# Patient Record
Sex: FEMALE | Race: WHITE | NOT HISPANIC OR LATINO | Employment: OTHER | URBAN - NONMETROPOLITAN AREA
[De-identification: names, ages, dates, MRNs, and addresses within clinical notes are randomized per-mention and may not be internally consistent; named-entity substitution may affect disease eponyms.]

---

## 2019-04-09 ENCOUNTER — HOSPITAL ENCOUNTER (EMERGENCY)
Facility: OTHER | Age: 24
Discharge: HOME | End: 2019-04-09
Attending: PHYSICIAN ASSISTANT | Admitting: PHYSICIAN ASSISTANT
Payer: COMMERCIAL

## 2019-04-09 ENCOUNTER — APPOINTMENT (OUTPATIENT)
Dept: GENERAL RADIOLOGY | Facility: OTHER | Age: 24
End: 2019-04-09
Attending: PHYSICIAN ASSISTANT
Payer: COMMERCIAL

## 2019-04-09 VITALS
OXYGEN SATURATION: 100 % | WEIGHT: 135 LBS | TEMPERATURE: 97.8 F | DIASTOLIC BLOOD PRESSURE: 79 MMHG | HEIGHT: 64 IN | BODY MASS INDEX: 23.05 KG/M2 | HEART RATE: 104 BPM | RESPIRATION RATE: 14 BRPM | SYSTOLIC BLOOD PRESSURE: 126 MMHG

## 2019-04-09 DIAGNOSIS — F41.0 ANXIETY ATTACK: ICD-10-CM

## 2019-04-09 DIAGNOSIS — R07.89 ATYPICAL CHEST PAIN: ICD-10-CM

## 2019-04-09 LAB
ALBUMIN SERPL-MCNC: 4.1 G/DL (ref 3.5–5.7)
ALBUMIN UR-MCNC: NEGATIVE MG/DL
ALP SERPL-CCNC: 44 U/L (ref 34–104)
ALT SERPL W P-5'-P-CCNC: 12 U/L (ref 7–52)
ANION GAP SERPL CALCULATED.3IONS-SCNC: 8 MMOL/L (ref 3–14)
APPEARANCE UR: CLEAR
AST SERPL W P-5'-P-CCNC: 15 U/L (ref 13–39)
BASOPHILS # BLD AUTO: 0 10E9/L (ref 0–0.2)
BASOPHILS NFR BLD AUTO: 0.7 %
BILIRUB SERPL-MCNC: 0.7 MG/DL (ref 0.3–1)
BILIRUB UR QL STRIP: NEGATIVE
BUN SERPL-MCNC: 13 MG/DL (ref 7–25)
CALCIUM SERPL-MCNC: 9.3 MG/DL (ref 8.6–10.3)
CHLORIDE SERPL-SCNC: 109 MMOL/L (ref 98–107)
CO2 SERPL-SCNC: 23 MMOL/L (ref 21–31)
COLOR UR AUTO: YELLOW
CREAT SERPL-MCNC: 0.91 MG/DL (ref 0.6–1.2)
D DIMER PPP DDU-MCNC: <200 NG/ML D-DU (ref 0–230)
DIFFERENTIAL METHOD BLD: NORMAL
EOSINOPHIL # BLD AUTO: 0.2 10E9/L (ref 0–0.7)
EOSINOPHIL NFR BLD AUTO: 3.5 %
ERYTHROCYTE [DISTWIDTH] IN BLOOD BY AUTOMATED COUNT: 14 % (ref 10–15)
GFR SERPL CREATININE-BSD FRML MDRD: 77 ML/MIN/{1.73_M2}
GLUCOSE SERPL-MCNC: 95 MG/DL (ref 70–105)
GLUCOSE UR STRIP-MCNC: NEGATIVE MG/DL
HCG UR QL: NEGATIVE
HCT VFR BLD AUTO: 37.1 % (ref 35–47)
HGB BLD-MCNC: 12.1 G/DL (ref 11.7–15.7)
HGB UR QL STRIP: NEGATIVE
IMM GRANULOCYTES # BLD: 0 10E9/L (ref 0–0.4)
IMM GRANULOCYTES NFR BLD: 0.2 %
INR PPP: 0.81 (ref 0–1.3)
KETONES UR STRIP-MCNC: NEGATIVE MG/DL
LEUKOCYTE ESTERASE UR QL STRIP: NEGATIVE
LYMPHOCYTES # BLD AUTO: 2.2 10E9/L (ref 0.8–5.3)
LYMPHOCYTES NFR BLD AUTO: 39.8 %
MCH RBC QN AUTO: 29.4 PG (ref 26.5–33)
MCHC RBC AUTO-ENTMCNC: 32.6 G/DL (ref 31.5–36.5)
MCV RBC AUTO: 90 FL (ref 78–100)
MONOCYTES # BLD AUTO: 0.6 10E9/L (ref 0–1.3)
MONOCYTES NFR BLD AUTO: 10 %
NEUTROPHILS # BLD AUTO: 2.5 10E9/L (ref 1.6–8.3)
NEUTROPHILS NFR BLD AUTO: 45.8 %
NITRATE UR QL: NEGATIVE
PH UR STRIP: 6 PH (ref 5–9)
PLATELET # BLD AUTO: 203 10E9/L (ref 150–450)
POTASSIUM SERPL-SCNC: 4.1 MMOL/L (ref 3.5–5.1)
PROT SERPL-MCNC: 6.7 G/DL (ref 6.4–8.9)
RBC # BLD AUTO: 4.12 10E12/L (ref 3.8–5.2)
SODIUM SERPL-SCNC: 140 MMOL/L (ref 134–144)
SOURCE: NORMAL
SP GR UR STRIP: 1.02 (ref 1–1.03)
TROPONIN I SERPL-MCNC: <0.03 UG/L (ref 0–0.03)
TSH SERPL DL<=0.05 MIU/L-ACNC: 3.05 IU/ML (ref 0.34–5.6)
UROBILINOGEN UR STRIP-ACNC: 0.2 EU/DL (ref 0.2–1)
WBC # BLD AUTO: 5.5 10E9/L (ref 4–11)

## 2019-04-09 PROCEDURE — 71046 X-RAY EXAM CHEST 2 VIEWS: CPT

## 2019-04-09 PROCEDURE — 81003 URINALYSIS AUTO W/O SCOPE: CPT | Performed by: PHYSICIAN ASSISTANT

## 2019-04-09 PROCEDURE — 99285 EMERGENCY DEPT VISIT HI MDM: CPT | Mod: 25 | Performed by: PHYSICIAN ASSISTANT

## 2019-04-09 PROCEDURE — 25000132 ZZH RX MED GY IP 250 OP 250 PS 637: Performed by: PHYSICIAN ASSISTANT

## 2019-04-09 PROCEDURE — 85379 FIBRIN DEGRADATION QUANT: CPT | Performed by: PHYSICIAN ASSISTANT

## 2019-04-09 PROCEDURE — 93005 ELECTROCARDIOGRAM TRACING: CPT | Performed by: PHYSICIAN ASSISTANT

## 2019-04-09 PROCEDURE — 85025 COMPLETE CBC W/AUTO DIFF WBC: CPT | Performed by: PHYSICIAN ASSISTANT

## 2019-04-09 PROCEDURE — 84443 ASSAY THYROID STIM HORMONE: CPT | Performed by: PHYSICIAN ASSISTANT

## 2019-04-09 PROCEDURE — 80053 COMPREHEN METABOLIC PANEL: CPT | Performed by: PHYSICIAN ASSISTANT

## 2019-04-09 PROCEDURE — 84484 ASSAY OF TROPONIN QUANT: CPT | Performed by: PHYSICIAN ASSISTANT

## 2019-04-09 PROCEDURE — 99284 EMERGENCY DEPT VISIT MOD MDM: CPT | Mod: Z6 | Performed by: PHYSICIAN ASSISTANT

## 2019-04-09 PROCEDURE — 25000125 ZZHC RX 250: Performed by: PHYSICIAN ASSISTANT

## 2019-04-09 PROCEDURE — 93010 ELECTROCARDIOGRAM REPORT: CPT | Performed by: INTERNAL MEDICINE

## 2019-04-09 PROCEDURE — 85610 PROTHROMBIN TIME: CPT | Performed by: PHYSICIAN ASSISTANT

## 2019-04-09 PROCEDURE — 81025 URINE PREGNANCY TEST: CPT | Performed by: PHYSICIAN ASSISTANT

## 2019-04-09 PROCEDURE — 36415 COLL VENOUS BLD VENIPUNCTURE: CPT | Performed by: PHYSICIAN ASSISTANT

## 2019-04-09 RX ORDER — NORETHINDRONE ACETATE AND ETHINYL ESTRADIOL 1MG-20(21)
1 KIT ORAL DAILY
COMMUNITY
End: 2019-11-11

## 2019-04-09 RX ORDER — HYDROXYZINE HYDROCHLORIDE 25 MG/1
25 TABLET, FILM COATED ORAL 3 TIMES DAILY PRN
COMMUNITY
End: 2019-08-20

## 2019-04-09 RX ORDER — PANTOPRAZOLE SODIUM 40 MG/1
40 TABLET, DELAYED RELEASE ORAL
Status: DISCONTINUED | OUTPATIENT
Start: 2019-04-10 | End: 2019-04-09 | Stop reason: HOSPADM

## 2019-04-09 RX ORDER — LORAZEPAM 1 MG/1
1 TABLET ORAL EVERY 6 HOURS PRN
Qty: 20 TABLET | Refills: 0 | Status: SHIPPED | OUTPATIENT
Start: 2019-04-09 | End: 2019-08-20

## 2019-04-09 RX ORDER — LORAZEPAM 1 MG/1
1 TABLET ORAL ONCE
Status: COMPLETED | OUTPATIENT
Start: 2019-04-09 | End: 2019-04-09

## 2019-04-09 RX ADMIN — PANTOPRAZOLE SODIUM 40 MG: 40 TABLET, DELAYED RELEASE ORAL at 17:51

## 2019-04-09 RX ADMIN — LORAZEPAM 1 MG: 1 TABLET ORAL at 19:25

## 2019-04-09 RX ADMIN — LIDOCAINE HYDROCHLORIDE 30 ML: 20 SOLUTION ORAL; TOPICAL at 17:51

## 2019-04-09 ASSESSMENT — ENCOUNTER SYMPTOMS
ABDOMINAL PAIN: 0
BACK PAIN: 0
ADENOPATHY: 0
CHILLS: 0
CONFUSION: 0
HEMATURIA: 0
CHEST TIGHTNESS: 1
LIGHT-HEADEDNESS: 0
FEVER: 0
DIZZINESS: 0
BRUISES/BLEEDS EASILY: 0
WOUND: 0
SHORTNESS OF BREATH: 0

## 2019-04-09 ASSESSMENT — MIFFLIN-ST. JEOR: SCORE: 1352.36

## 2019-04-09 NOTE — ED TRIAGE NOTES
Pt presents to the ED after having chest pain intermittently for 2 weeks. Pt reports having pain wake her up today at 1550, pt reports the pain wraps around to her back and her left arm is numb which is new. Pt is on oral contraceptive.     Mitzi Nguyễn RN on 4/9/2019 at 4:42 PM

## 2019-04-09 NOTE — ED AVS SNAPSHOT
[facility]  [address]  [place] [address]  Phone:  [phone]  Fax:  [phone]                                    Paula Watson   MRN: [id number]    Department:  [facility]   Date of Visit:  4/9/2019           After Visit Summary Signature Page    I have received my discharge instructions, and my questions have been answered. I have discussed any challenges I see with this plan with the nurse or doctor.    ..........................................................................................................................................  Patient/Patient Representative Signature      ..........................................................................................................................................  Patient Representative Print Name and Relationship to Patient    ..................................................               ................................................  Date                                   Time    ..........................................................................................................................................  Reviewed by Signature/Title    ...................................................              ..............................................  Date                                               Time          22EPIC Rev 08/18

## 2019-04-10 NOTE — ED PROVIDER NOTES
History   No chief complaint on file.    This is a 24-year-old female who started having chest pain earlier today after taking a nap.  She has never had this happen before.  Rates her pain as being a 3 or 4 on a 1-10 scale.  She started having some numbness and tingling to the left arm as well and this made her nervous.  She initially thought maybe it was anxiety but she has not had an anxiety or panic attack in quite a while.  She is currently on birth control.  Recently quit smoking.  Denies any fever or chills.  No nausea or vomiting.  No lightheadedness or dizziness.            Allergies:  Allergies   Allergen Reactions     Bees      Zoloft [Sertraline]        Problem List:    There are no active problems to display for this patient.       Past Medical History:    History reviewed. No pertinent past medical history.    Past Surgical History:    History reviewed. No pertinent surgical history.    Family History:    History reviewed. No pertinent family history.    Social History:  Marital Status:   [2]  Social History     Tobacco Use     Smoking status: Former Smoker     Last attempt to quit: 2019     Years since quittin.0     Smokeless tobacco: Never Used   Substance Use Topics     Alcohol use: Yes     Comment: occ     Drug use: Never        Medications:      hydrOXYzine (ATARAX) 25 MG tablet   norethindrone-ethinyl estradiol (JUNEL FE 1/20) 1-20 MG-MCG tablet         Review of Systems   Constitutional: Negative for chills and fever.   HENT: Negative for congestion.    Eyes: Negative for visual disturbance.   Respiratory: Positive for chest tightness. Negative for shortness of breath.    Cardiovascular: Positive for chest pain.   Gastrointestinal: Negative for abdominal pain.   Genitourinary: Negative for hematuria.   Musculoskeletal: Negative for back pain.   Skin: Negative for rash and wound.   Neurological: Negative for dizziness, syncope and light-headedness.   Hematological: Negative for  "adenopathy. Does not bruise/bleed easily.   Psychiatric/Behavioral: Negative for confusion.       Physical Exam   BP: (!) 156/104  Pulse: 104  Temp: 99.1  F (37.3  C)  Resp: 20  Height: 162.6 cm (5' 4\")  Weight: 61.2 kg (135 lb)  SpO2: 100 %      Physical Exam   Constitutional: She is oriented to person, place, and time. No distress.   HENT:   Head: Atraumatic.   Mouth/Throat: Oropharynx is clear and moist. No oropharyngeal exudate.   Eyes: Pupils are equal, round, and reactive to light. No scleral icterus.   Cardiovascular: Normal heart sounds and intact distal pulses.   Pulmonary/Chest: Breath sounds normal. No respiratory distress.   Lung sounds clear.  SaO2 is 100% on room air.   Abdominal: Soft. Bowel sounds are normal. There is no tenderness.   Musculoskeletal: She exhibits no edema or tenderness.   Neurological: She is alert and oriented to person, place, and time.   Skin: Skin is warm. No rash noted. She is not diaphoretic.       ED Course        Procedures          EKG shows normal sinus rhythm.             Results for orders placed or performed during the hospital encounter of 04/09/19 (from the past 24 hour(s))   CBC with platelets differential   Result Value Ref Range    WBC 5.5 4.0 - 11.0 10e9/L    RBC Count 4.12 3.8 - 5.2 10e12/L    Hemoglobin 12.1 11.7 - 15.7 g/dL    Hematocrit 37.1 35.0 - 47.0 %    MCV 90 78 - 100 fl    MCH 29.4 26.5 - 33.0 pg    MCHC 32.6 31.5 - 36.5 g/dL    RDW 14.0 10.0 - 15.0 %    Platelet Count 203 150 - 450 10e9/L    Diff Method Automated Method     % Neutrophils 45.8 %    % Lymphocytes 39.8 %    % Monocytes 10.0 %    % Eosinophils 3.5 %    % Basophils 0.7 %    % Immature Granulocytes 0.2 %    Absolute Neutrophil 2.5 1.6 - 8.3 10e9/L    Absolute Lymphocytes 2.2 0.8 - 5.3 10e9/L    Absolute Monocytes 0.6 0.0 - 1.3 10e9/L    Absolute Eosinophils 0.2 0.0 - 0.7 10e9/L    Absolute Basophils 0.0 0.0 - 0.2 10e9/L    Abs Immature Granulocytes 0.0 0 - 0.4 10e9/L   D-Dimer (HI,GH) "   Result Value Ref Range    D-Dimer ng/mL <200 0 - 230 ng/ml D-DU   INR   Result Value Ref Range    INR 0.81 0 - 1.3   Comprehensive metabolic panel   Result Value Ref Range    Sodium 140 134 - 144 mmol/L    Potassium 4.1 3.5 - 5.1 mmol/L    Chloride 109 (H) 98 - 107 mmol/L    Carbon Dioxide 23 21 - 31 mmol/L    Anion Gap 8 3 - 14 mmol/L    Glucose 95 70 - 105 mg/dL    Urea Nitrogen 13 7 - 25 mg/dL    Creatinine 0.91 0.60 - 1.20 mg/dL    GFR Estimate 77 >60 mL/min/[1.73_m2]    GFR Estimate If Black >90 >60 mL/min/[1.73_m2]    Calcium 9.3 8.6 - 10.3 mg/dL    Bilirubin Total 0.7 0.3 - 1.0 mg/dL    Albumin 4.1 3.5 - 5.7 g/dL    Protein Total 6.7 6.4 - 8.9 g/dL    Alkaline Phosphatase 44 34 - 104 U/L    ALT 12 7 - 52 U/L    AST 15 13 - 39 U/L   Troponin I   Result Value Ref Range    Troponin I ES <0.030 0.000 - 0.034 ug/L   TSH Reflex GH   Result Value Ref Range    TSH Reflex 3.05 0.34 - 5.60 IU/mL   *UA reflex to Microscopic   Result Value Ref Range    Color Urine Yellow     Appearance Urine Clear     Glucose Urine Negative NEG^Negative mg/dL    Bilirubin Urine Negative NEG^Negative    Ketones Urine Negative NEG^Negative mg/dL    Specific Gravity Urine 1.020 1.000 - 1.030    Blood Urine Negative NEG^Negative    pH Urine 6.0 5.0 - 9.0 pH    Protein Albumin Urine Negative NEG^Negative mg/dL    Urobilinogen Urine 0.2 0.2 - 1.0 EU/dL    Nitrite Urine Negative NEG^Negative    Leukocyte Esterase Urine Negative NEG^Negative    Source Midstream Urine    HCG qualitative urine (UPT)   Result Value Ref Range    HCG Qual Urine Negative NEG^Negative   XR Chest 2 Views    Narrative    PROCEDURE:  XR CHEST 2 VW    HISTORY: chest pain, .    COMPARISON:  None.    FINDINGS:  The cardiomediastinal contours are normal.  The trachea is midline.  No focal consolidation, effusion or pneumothorax.    No suspicious osseous lesion or subdiaphragmatic free air.      Impression    IMPRESSION:      No acute cardiopulmonary process.       DALLAS GAMEZ MD       Medications   pantoprazole (PROTONIX) EC tablet 40 mg (40 mg Oral Given 4/9/19 1751)   lidocaine VISCOUS (XYLOCAINE) 2 % 15 mL, alum & mag hydroxide-simethicone (MYLANTA ES/MAALOX  ES) 15 mL GI Cocktail (30 mLs Oral Given 4/9/19 1751)   LORazepam (ATIVAN) tablet 1 mg (1 mg Oral Given 4/9/19 1925)       Assessments & Plan (with Medical Decision Making)     I have reviewed the nursing notes.    I have reviewed the findings, diagnosis, plan and need for follow up with the patient.         Medication List      Started    LORazepam 1 MG tablet  Commonly known as:  ATIVAN  1 mg, Oral, EVERY 6 HOURS PRN            Final diagnoses:   Atypical chest pain   Anxiety attack     Afebrile.  Vital signs stable.  Sudden onset of some midsternal chest pain.  She does have a history of anxiety as well and wonders if this might be causing it.  Some radiation to her left arm.  EKG shows normal sinus rhythm.  Troponin is normal.  D-dimer is normal.  This is reassuring.  She was given Protonix orally in the ER as well as a GI cocktail.  She does not feel has helped much with her pain.  CBC is unremarkable.  CMP is normal.  UA is unremarkable.  Chest x-ray shows no acute abnormality.  She had some continued pain which she describes as between her shoulder blades.  She was given oral Ativan and her pain dissipated.  Anxiety attack, atypical chest pain.  I discussed close follow-up with her primary care provider for further evaluation and she will arrange this.  Rx for short course of Ativan as needed for anxiety attacks.  Follow-up sooner if there is any other concerns problems or questions  4/9/2019   [facility]     Napoleon Girard PA-C  04/09/19 [id number]

## 2019-04-19 ENCOUNTER — OFFICE VISIT (OUTPATIENT)
Dept: FAMILY MEDICINE | Facility: OTHER | Age: 24
End: 2019-04-19
Attending: NURSE PRACTITIONER
Payer: COMMERCIAL

## 2019-04-19 VITALS
HEART RATE: 76 BPM | OXYGEN SATURATION: 99 % | DIASTOLIC BLOOD PRESSURE: 74 MMHG | HEIGHT: 64 IN | SYSTOLIC BLOOD PRESSURE: 118 MMHG | WEIGHT: 137 LBS | TEMPERATURE: 98.2 F | BODY MASS INDEX: 23.39 KG/M2

## 2019-04-19 DIAGNOSIS — J45.21 MILD INTERMITTENT ASTHMA WITH EXACERBATION: ICD-10-CM

## 2019-04-19 DIAGNOSIS — J11.1 INFLUENZA-LIKE ILLNESS: Primary | ICD-10-CM

## 2019-04-19 PROCEDURE — 99214 OFFICE O/P EST MOD 30 MIN: CPT | Performed by: PHYSICIAN ASSISTANT

## 2019-04-19 RX ORDER — ALBUTEROL SULFATE 90 UG/1
1-2 AEROSOL, METERED RESPIRATORY (INHALATION) EVERY 4 HOURS PRN
COMMUNITY
Start: 2016-01-28

## 2019-04-19 RX ORDER — MONTELUKAST SODIUM 10 MG/1
10 TABLET ORAL AT BEDTIME
COMMUNITY
Start: 2019-04-19 | End: 2020-07-28

## 2019-04-19 RX ORDER — PREDNISONE 20 MG/1
40 TABLET ORAL DAILY
Qty: 10 TABLET | Refills: 0 | Status: SHIPPED | OUTPATIENT
Start: 2019-04-19 | End: 2019-08-20

## 2019-04-19 RX ORDER — OSELTAMIVIR PHOSPHATE 75 MG/1
75 CAPSULE ORAL 2 TIMES DAILY
Qty: 10 CAPSULE | Refills: 0 | Status: SHIPPED | OUTPATIENT
Start: 2019-04-19 | End: 2019-08-20

## 2019-04-19 ASSESSMENT — PAIN SCALES - GENERAL: PAINLEVEL: NO PAIN (0)

## 2019-04-19 ASSESSMENT — MIFFLIN-ST. JEOR: SCORE: 1361.43

## 2019-04-19 NOTE — PROGRESS NOTES
"SUBJECTIVE:  Paula Watson is a 24 year old female who presents to the clinic today for evaluation of cough, body aches, fever. TMax 101  Onset yesterday, course is worsening  Associated symptoms: runny nose, productive and dry cough, nasal congestion, headache, sinus pressure.   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   History of asthma and environmental allergies is - positive  Tobacco use - former smoker  She has 7 month old baby at home      No past medical history on file.   Social History     Tobacco Use     Smoking status: Former Smoker     Last attempt to quit: 2019     Years since quittin.0     Smokeless tobacco: Never Used   Substance Use Topics     Alcohol use: Yes     Comment: occ     Current Outpatient Medications   Medication     albuterol (PROVENTIL HFA) 108 (90 Base) MCG/ACT inhaler     hydrOXYzine (ATARAX) 25 MG tablet     LORazepam (ATIVAN) 1 MG tablet     montelukast (SINGULAIR) 10 MG tablet     norethindrone-ethinyl estradiol (JUNEL ) 1-20 MG-MCG tablet     No current facility-administered medications for this visit.         Allergies   Allergen Reactions     Bees      Zoloft [Sertraline]        ROS  General fatigue, fever  HENT - POSITIVE per HPI  Respiratory POSITIVE per HPI  Abdomen - negative  Skin - negative      OBJECTIVE:  Exam:  Vitals:    19 1402   BP: 118/74   Pulse: 76   Temp: 98.2  F (36.8  C)   TempSrc: Tympanic   SpO2: 99%   Weight: 62.1 kg (137 lb)   Height: 1.626 m (5' 4\")     General: healthy, alert, appears hydarated, vital signs stable. Mild distress  Head: NORMAL - atraumatic, nontender.  Ears: normal canals, TMs bilaterally  Eyes: NORMAL - no injection no discharge, no periorbital swelling.  Nose: ABNORMAL - swollen nasal turbinates.  Neck: supple, non-tender, free range of motion, no adenopathy  Throat: " ABNORMAL - mild erythema.  Resp: Normal - Clear to auscultation without rales, rhonchi, or wheezing.  Cardiac: NORMAL - regular rate and rhythm without murmur.      ASSESSMENT:    (R69) Influenza-like illness  (primary encounter diagnosis)  Plan: oseltamivir (TAMIFLU) 75 MG capsule      (J45.21) Mild intermittent asthma with exacerbation  Plan: predniSONE (DELTASONE) 20 MG tablet    Flu like symptoms & has positive exposure   Patient would like to defer testing and would like treatment with antiviral (asthma and she has a 7 month old at home)  Lungs are clear on exam today, good air flow and no wheezing. Will provide a written script for prednisone to use if she needs this for asthma exacerbation.   Tamiflu 75 mg oral tablet, twice daily x 5 days  Symptomatic treatments for cough, cold, sinus symptoms  Follow up with PCP for a recheck in 1 week, sooner for worsening  Patient received verbal and written instruction including review of warning signs    Chandni Jeffery PA-C on 4/19/2019 at 3:12 PM

## 2019-04-19 NOTE — NURSING NOTE
Patient presents to the clinic today for cough and body aches since yesterday. She states her temp this morning was 101.   Medication Reconciliation: complete    Karely Saunders LPN.................. 4/19/2019 2:00 PM

## 2019-04-19 NOTE — LETTER
April 19, 2019      Paula Watson  [address] [place] [address]        To Whom It May Concern:    Paula Watson  was seen on 4/19/19.  Please excuse her until fever free for 24 hours and feeling better due to illness.         Sincerely,        [facility] NURSE

## 2019-04-19 NOTE — PATIENT INSTRUCTIONS
Influenza like illness  Tamiflu 75 mg oral tablet, twice daily x 5 days  Rest and fluids  Symptomatic treatments for cough symptoms  Humidifier, vicks vapor rub, honey, OTC mucinex  OTC cough, cold, sinus  Continue with at home albuterol as needed for cough, wheezing, shortness of breath  Print prescription for prednisone to fill for asthma exacerbation. 20 mg oral tablet, take 2 tablets with food in AM once daily x 3- 5 days  Follow up with PCP in 1 week, sooner for worsening  Seek immediate care for    Cough with lots of colored mucus (sputum) or blood in your mucus    Chest pain, shortness of breath, wheezing, or trouble breathing    Severe headache, or face, neck, or ear pain    New rash with fever    Fever of 100.4 F (38 C) or higher, or as directed by your healthcare provider    Confusion, behavior change, or seizure    Severe weakness or dizziness    You get a new fever or cough after getting better for a few days    Patient Education     Influenza (Adult)    Influenza is also called the flu. It is a viral illness that affects the air passages of your lungs. It is different from the common cold. The flu can easily be passed from one to person to another. It may be spread through the air by coughing and sneezing. Or it can be spread by touching the sick person and then touching your own eyes, nose, or mouth.  The flu starts 1 to 3 days after you are exposed to the flu virus. It may last for 1 to 2 weeks but many people feel tired or fatigued for many weeks afterward. You usually don t need to take antibiotics unless you have a complication. This might be an ear or sinus infection or pneumonia.  Symptoms of the flu may be mild or severe. They can include extreme tiredness (wanting to stay in bed all day), chills, fevers, muscle aches, soreness with eye movement, headache, and a dry, hacking cough.  Home care  Follow these guidelines when caring for yourself at home:    Avoid being around cigarette smoke,  whether yours or other people s.    Acetaminophen or ibuprofen will help ease your fever, muscle aches, and headache. Don t give aspirin to anyone younger than 18 who has the flu. Aspirin can harm the liver.    Nausea and loss of appetite are common with the flu. Eat light meals. Drink 6 to 8 glasses of liquids every day. Good choices are water, sport drinks, soft drinks without caffeine, juices, tea, and soup. Extra fluids will also help loosen secretions in your nose and lungs.    Over-the-counter cold medicines will not make the flu go away faster. But the medicines may help with coughing, sore throat, and congestion in your nose and sinuses. Don t use a decongestant if you have high blood pressure.    Stay home until your fever has been gone for at least 24 hours without using medicine to reduce fever.  Follow-up care  Follow up with your healthcare provider, or as advised, if you are not getting better over the next week.  If you are age 65 or older, talk with your provider about getting a pneumococcal vaccine every 5 years. You should also get this vaccine if you have chronic asthma or COPD. All adults should get a flu vaccine every fall. Ask your provider about this.  When to seek medical advice  Call your healthcare provider right away if any of these occur:    Cough with lots of colored mucus (sputum) or blood in your mucus    Chest pain, shortness of breath, wheezing, or trouble breathing    Severe headache, or face, neck, or ear pain    New rash with fever    Fever of 100.4 F (38 C) or higher, or as directed by your healthcare provider    Confusion, behavior change, or seizure    Severe weakness or dizziness    You get a new fever or cough after getting better for a few days  Date Last Reviewed: 1/1/2017 2000-2018 [employer]. [address] [place] [address], [place] [address]. All rights reserved. This information is not intended as a substitute for professional medical care. Always follow your  healthcare professional's instructions.

## 2019-05-27 ENCOUNTER — HOSPITAL ENCOUNTER (EMERGENCY)
Facility: OTHER | Age: 24
Discharge: HOME | End: 2019-05-27
Attending: FAMILY MEDICINE | Admitting: FAMILY MEDICINE
Payer: COMMERCIAL

## 2019-05-27 VITALS
SYSTOLIC BLOOD PRESSURE: 139 MMHG | OXYGEN SATURATION: 100 % | TEMPERATURE: 97.9 F | HEIGHT: 64 IN | RESPIRATION RATE: 16 BRPM | DIASTOLIC BLOOD PRESSURE: 93 MMHG | WEIGHT: 137 LBS | BODY MASS INDEX: 23.39 KG/M2

## 2019-05-27 DIAGNOSIS — R10.84 ABDOMINAL PAIN, GENERALIZED: ICD-10-CM

## 2019-05-27 LAB
ALBUMIN UR-MCNC: NEGATIVE MG/DL
APPEARANCE UR: CLEAR
BILIRUB UR QL STRIP: NEGATIVE
COLOR UR AUTO: YELLOW
GLUCOSE UR STRIP-MCNC: NEGATIVE MG/DL
HCG UR QL: POSITIVE
HGB UR QL STRIP: NEGATIVE
KETONES UR STRIP-MCNC: NEGATIVE MG/DL
LEUKOCYTE ESTERASE UR QL STRIP: NEGATIVE
NITRATE UR QL: NEGATIVE
PH UR STRIP: 5.5 PH (ref 5–9)
SOURCE: NORMAL
SP GR UR STRIP: 1.01 (ref 1–1.03)
SPECIMEN SOURCE: NORMAL
UROBILINOGEN UR STRIP-ACNC: 0.2 EU/DL (ref 0.2–1)
WET PREP SPEC: NORMAL

## 2019-05-27 PROCEDURE — 99283 EMERGENCY DEPT VISIT LOW MDM: CPT | Performed by: FAMILY MEDICINE

## 2019-05-27 PROCEDURE — 87210 SMEAR WET MOUNT SALINE/INK: CPT | Performed by: FAMILY MEDICINE

## 2019-05-27 PROCEDURE — 99282 EMERGENCY DEPT VISIT SF MDM: CPT | Mod: Z6 | Performed by: FAMILY MEDICINE

## 2019-05-27 PROCEDURE — 81025 URINE PREGNANCY TEST: CPT | Performed by: FAMILY MEDICINE

## 2019-05-27 PROCEDURE — 81003 URINALYSIS AUTO W/O SCOPE: CPT | Performed by: FAMILY MEDICINE

## 2019-05-27 ASSESSMENT — MIFFLIN-ST. JEOR: SCORE: 1361.43

## 2019-05-27 NOTE — ED AVS SNAPSHOT
[facility]  [address]  [place] [address]  Phone:  [phone]  Fax:  [phone]                                    Paula Watson   MRN: [id number]    Department:  [facility]   Date of Visit:  5/27/2019           After Visit Summary Signature Page    I have received my discharge instructions, and my questions have been answered. I have discussed any challenges I see with this plan with the nurse or doctor.    ..........................................................................................................................................  Patient/Patient Representative Signature      ..........................................................................................................................................  Patient Representative Print Name and Relationship to Patient    ..................................................               ................................................  Date                                   Time    ..........................................................................................................................................  Reviewed by Signature/Title    ...................................................              ..............................................  Date                                               Time          22EPIC Rev 08/18

## 2019-05-28 NOTE — ED TRIAGE NOTES
Patient complaining of lower abdominal pain that started 3-4 days ago. Pain comes and goes on both sides of abdomen.   Patient stated she just found out that she was pregnant but unsure how far along she is.   Denies any spotting or painful urination.

## 2019-05-28 NOTE — ED PROVIDER NOTES
"  History     Chief Complaint   Patient presents with     Abdominal Pain     HPI  Paula Watson is a 24 year old female who is pregnant, she is not aware of gestational age, but she reports mild abdominal pain in the bilateral lower quadrants for a couple of days.      Surgical hx to abdomen includes  and appendectomy, along with ovarian cyst removal laparoscopically.    No fevers.    Eating has no effect on pain.    Does not radiate anywhere to the back.    Not associated with urinary symptoms.    No vaginal bleeding, but slight increased vaginal discharge.    No N/V/D.      Allergies:  Allergies   Allergen Reactions     Bees      Zoloft [Sertraline]        Problem List:    There are no active problems to display for this patient.       Past Medical History:    History reviewed. No pertinent past medical history.    Past Surgical History:    History reviewed. No pertinent surgical history.    Family History:    History reviewed. No pertinent family history.    Social History:  Marital Status:   [2]  Social History     Tobacco Use     Smoking status: Former Smoker     Last attempt to quit: 2019     Years since quittin.1     Smokeless tobacco: Never Used   Substance Use Topics     Alcohol use: Yes     Comment: occ     Drug use: Never        Medications:      hydrOXYzine (ATARAX) 25 MG tablet   LORazepam (ATIVAN) 1 MG tablet   montelukast (SINGULAIR) 10 MG tablet   albuterol (PROVENTIL HFA) 108 (90 Base) MCG/ACT inhaler   norethindrone-ethinyl estradiol (JUNEL ) 1-20 MG-MCG tablet         Review of Systems  Besides above, no other pertinent positives  Physical Exam   BP: (!) 139/93  Heart Rate: 92  Temp: 97.9  F (36.6  C)  Resp: 16  Height: 162.6 cm (5' 4\")  Weight: 62.1 kg (137 lb)  SpO2: 100 %      Physical [place] woman.  Stable vitals.  Heart reg.  Lungs clear.  abd soft, minimally tender in both lower quadrants.  No masses or hernias.  ND.  Otherwise NT.  No surgical signs like " involuntary guarding or rebound.  NABS.  Pelvic yields no CMT and no adnexal tenderness.      Labs and wet prep are reassuring and normal.      ED Course        Procedures               Critical Care time:  none               Results for orders placed or performed during the hospital encounter of 05/27/19 (from the past 24 hour(s))   HCG qualitative urine (UPT)   Result Value Ref Range    HCG Qual Urine Positive (A) NEG^Negative   *UA reflex to Microscopic   Result Value Ref Range    Color Urine Yellow     Appearance Urine Clear     Glucose Urine Negative NEG^Negative mg/dL    Bilirubin Urine Negative NEG^Negative    Ketones Urine Negative NEG^Negative mg/dL    Specific Gravity Urine 1.015 1.000 - 1.030    Blood Urine Negative NEG^Negative    pH Urine 5.5 5.0 - 9.0 pH    Protein Albumin Urine Negative NEG^Negative mg/dL    Urobilinogen Urine 0.2 0.2 - 1.0 EU/dL    Nitrite Urine Negative NEG^Negative    Leukocyte Esterase Urine Negative NEG^Negative    Source Midstream Urine    Wet prep   Result Value Ref Range    Specimen Description Vagina     Wet Prep No yeast seen     Wet Prep No Trichomonas seen     Wet Prep No clue cells seen        Medications - No data to display    Assessments & Plan (with Medical Decision Making)     I have reviewed the nursing notes.    I have reviewed the findings, diagnosis, plan and need for follow up with the patient.   patient has mild abdominal pain.  With no vaginal bleeding and minimal pain, highly doubt ectopic pregnancy, although considered.    No obvious emergent infectious process or surgical issue appears to be present at this time.    Would recommend follow up in clinic or ER if symptoms markedly worsen.      Doubt gallbladder, pancreatic, stomach etiology.  Doubt diverticulitis or pregnancy-related problem.  Doubt endometritis.  Doubt STD.       Medication List      ASK your doctor about these medications    oseltamivir 75 MG capsule  Commonly known as:  TAMIFLU  75 mg,  Oral, 2 TIMES DAILY  Ask about: Should I take this medication?     predniSONE 20 MG tablet  Commonly known as:  DELTASONE  40 mg, Oral, DAILY  Ask about: Should I take this medication?            Final diagnoses:   Abdominal pain, generalized       5/27/2019   [facility], Tamir SOLIZ MD  05/27/19 [id number]

## 2019-08-20 ENCOUNTER — APPOINTMENT (OUTPATIENT)
Dept: ULTRASOUND IMAGING | Facility: OTHER | Age: 24
End: 2019-08-20
Attending: PHYSICIAN ASSISTANT
Payer: COMMERCIAL

## 2019-08-20 ENCOUNTER — HOSPITAL ENCOUNTER (EMERGENCY)
Facility: OTHER | Age: 24
Discharge: HOME | End: 2019-08-21
Attending: PHYSICIAN ASSISTANT | Admitting: PHYSICIAN ASSISTANT
Payer: COMMERCIAL

## 2019-08-20 DIAGNOSIS — N39.0 URINARY TRACT INFECTION: ICD-10-CM

## 2019-08-20 DIAGNOSIS — R10.9 ABDOMINAL PAIN IN PREGNANCY: ICD-10-CM

## 2019-08-20 DIAGNOSIS — O28.3 ABNORMAL FETAL ULTRASOUND: ICD-10-CM

## 2019-08-20 DIAGNOSIS — O26.899 ABDOMINAL PAIN IN PREGNANCY: ICD-10-CM

## 2019-08-20 LAB
ALBUMIN SERPL-MCNC: 3.8 G/DL (ref 3.5–5.7)
ALBUMIN UR-MCNC: NEGATIVE MG/DL
ALP SERPL-CCNC: 62 U/L (ref 34–104)
ALT SERPL W P-5'-P-CCNC: 6 U/L (ref 7–52)
ANION GAP SERPL CALCULATED.3IONS-SCNC: 8 MMOL/L (ref 3–14)
APPEARANCE UR: CLEAR
AST SERPL W P-5'-P-CCNC: 13 U/L (ref 13–39)
BACTERIA #/AREA URNS HPF: ABNORMAL /HPF
BASOPHILS # BLD AUTO: 0.1 10E9/L (ref 0–0.2)
BASOPHILS NFR BLD AUTO: 0.6 %
BILIRUB SERPL-MCNC: 0.8 MG/DL (ref 0.3–1)
BILIRUB UR QL STRIP: NEGATIVE
BUN SERPL-MCNC: 8 MG/DL (ref 7–25)
CALCIUM SERPL-MCNC: 9.3 MG/DL (ref 8.6–10.3)
CHLORIDE SERPL-SCNC: 103 MMOL/L (ref 98–107)
CO2 SERPL-SCNC: 25 MMOL/L (ref 21–31)
COLOR UR AUTO: YELLOW
CREAT SERPL-MCNC: 0.58 MG/DL (ref 0.6–1.2)
DIFFERENTIAL METHOD BLD: NORMAL
EOSINOPHIL # BLD AUTO: 0.1 10E9/L (ref 0–0.7)
EOSINOPHIL NFR BLD AUTO: 1.3 %
ERYTHROCYTE [DISTWIDTH] IN BLOOD BY AUTOMATED COUNT: 14.2 % (ref 10–15)
GFR SERPL CREATININE-BSD FRML MDRD: >90 ML/MIN/{1.73_M2}
GLUCOSE SERPL-MCNC: 84 MG/DL (ref 70–105)
GLUCOSE UR STRIP-MCNC: NEGATIVE MG/DL
HCT VFR BLD AUTO: 39.2 % (ref 35–47)
HGB BLD-MCNC: 13.2 G/DL (ref 11.7–15.7)
HGB UR QL STRIP: NEGATIVE
IMM GRANULOCYTES # BLD: 0 10E9/L (ref 0–0.4)
IMM GRANULOCYTES NFR BLD: 0.2 %
KETONES UR STRIP-MCNC: NEGATIVE MG/DL
LEUKOCYTE ESTERASE UR QL STRIP: ABNORMAL
LYMPHOCYTES # BLD AUTO: 3 10E9/L (ref 0.8–5.3)
LYMPHOCYTES NFR BLD AUTO: 35.6 %
MCH RBC QN AUTO: 29.7 PG (ref 26.5–33)
MCHC RBC AUTO-ENTMCNC: 33.7 G/DL (ref 31.5–36.5)
MCV RBC AUTO: 88 FL (ref 78–100)
MONOCYTES # BLD AUTO: 0.6 10E9/L (ref 0–1.3)
MONOCYTES NFR BLD AUTO: 6.8 %
NEUTROPHILS # BLD AUTO: 4.7 10E9/L (ref 1.6–8.3)
NEUTROPHILS NFR BLD AUTO: 55.5 %
NITRATE UR QL: NEGATIVE
NON-SQ EPI CELLS #/AREA URNS LPF: ABNORMAL /LPF
PH UR STRIP: 6 PH (ref 5–9)
PLATELET # BLD AUTO: 197 10E9/L (ref 150–450)
POTASSIUM SERPL-SCNC: 4 MMOL/L (ref 3.5–5.1)
PROT SERPL-MCNC: 6.5 G/DL (ref 6.4–8.9)
RBC # BLD AUTO: 4.45 10E12/L (ref 3.8–5.2)
RBC #/AREA URNS AUTO: ABNORMAL /HPF
SODIUM SERPL-SCNC: 136 MMOL/L (ref 134–144)
SOURCE: ABNORMAL
SP GR UR STRIP: 1.01 (ref 1–1.03)
UROBILINOGEN UR STRIP-ACNC: 0.2 EU/DL (ref 0.2–1)
WBC # BLD AUTO: 8.5 10E9/L (ref 4–11)
WBC #/AREA URNS AUTO: ABNORMAL /HPF

## 2019-08-20 PROCEDURE — 85025 COMPLETE CBC W/AUTO DIFF WBC: CPT | Performed by: PHYSICIAN ASSISTANT

## 2019-08-20 PROCEDURE — 76816 OB US FOLLOW-UP PER FETUS: CPT | Mod: TC

## 2019-08-20 PROCEDURE — 76805 OB US >/= 14 WKS SNGL FETUS: CPT | Mod: TC

## 2019-08-20 PROCEDURE — 81001 URINALYSIS AUTO W/SCOPE: CPT | Mod: XU | Performed by: PHYSICIAN ASSISTANT

## 2019-08-20 PROCEDURE — 80053 COMPREHEN METABOLIC PANEL: CPT | Performed by: PHYSICIAN ASSISTANT

## 2019-08-20 PROCEDURE — 25000132 ZZH RX MED GY IP 250 OP 250 PS 637: Performed by: PHYSICIAN ASSISTANT

## 2019-08-20 PROCEDURE — 99283 EMERGENCY DEPT VISIT LOW MDM: CPT | Mod: Z6 | Performed by: PHYSICIAN ASSISTANT

## 2019-08-20 PROCEDURE — 36415 COLL VENOUS BLD VENIPUNCTURE: CPT | Performed by: PHYSICIAN ASSISTANT

## 2019-08-20 PROCEDURE — 96374 THER/PROPH/DIAG INJ IV PUSH: CPT | Mod: XU | Performed by: PHYSICIAN ASSISTANT

## 2019-08-20 PROCEDURE — 25000128 H RX IP 250 OP 636: Performed by: PHYSICIAN ASSISTANT

## 2019-08-20 PROCEDURE — 99284 EMERGENCY DEPT VISIT MOD MDM: CPT | Mod: 25 | Performed by: PHYSICIAN ASSISTANT

## 2019-08-20 PROCEDURE — 81001 URINALYSIS AUTO W/SCOPE: CPT | Performed by: PHYSICIAN ASSISTANT

## 2019-08-20 RX ORDER — ACETAMINOPHEN 500 MG
500 TABLET ORAL ONCE
Status: DISCONTINUED | OUTPATIENT
Start: 2019-08-20 | End: 2019-08-21 | Stop reason: HOSPADM

## 2019-08-20 RX ORDER — CEPHALEXIN 500 MG/1
500 CAPSULE ORAL 4 TIMES DAILY
Qty: 28 CAPSULE | Refills: 0 | Status: SHIPPED | OUTPATIENT
Start: 2019-08-20 | End: 2019-11-25

## 2019-08-20 RX ORDER — PRENATAL VIT/IRON FUM/FOLIC AC 27MG-0.8MG
1 TABLET ORAL DAILY
COMMUNITY
End: 2019-11-25

## 2019-08-20 RX ORDER — ASPIRIN 81 MG/1
81 TABLET, CHEWABLE ORAL DAILY
COMMUNITY
End: 2019-11-25

## 2019-08-20 RX ADMIN — SODIUM CHLORIDE 1000 ML: 9 INJECTION, SOLUTION INTRAVENOUS at 21:14

## 2019-08-20 RX ADMIN — CEPHALEXIN 500 MG: 250 CAPSULE ORAL at 23:03

## 2019-08-20 RX ADMIN — PROCHLORPERAZINE EDISYLATE 10 MG: 5 INJECTION INTRAMUSCULAR; INTRAVENOUS at 21:25

## 2019-08-20 ASSESSMENT — ENCOUNTER SYMPTOMS
SHORTNESS OF BREATH: 0
NAUSEA: 1
VOMITING: 0
ABDOMINAL PAIN: 1
DYSURIA: 0
DIFFICULTY URINATING: 0
FATIGUE: 0
DIARRHEA: 0
FEVER: 0

## 2019-08-20 NOTE — ED AVS SNAPSHOT
[facility]  [address]  [place] [address]  Phone:  [phone]  Fax:  [phone]                                    Paula Watson   MRN: [id number]    Department:  [facility]   Date of Visit:  8/20/2019           After Visit Summary Signature Page    I have received my discharge instructions, and my questions have been answered. I have discussed any challenges I see with this plan with the nurse or doctor.    ..........................................................................................................................................  Patient/Patient Representative Signature      ..........................................................................................................................................  Patient Representative Print Name and Relationship to Patient    ..................................................               ................................................  Date                                   Time    ..........................................................................................................................................  Reviewed by Signature/Title    ...................................................              ..............................................  Date                                               Time          22EPIC Rev 08/18

## 2019-08-21 VITALS
TEMPERATURE: 97.4 F | BODY MASS INDEX: 24.27 KG/M2 | RESPIRATION RATE: 16 BRPM | HEART RATE: 70 BPM | HEIGHT: 63 IN | DIASTOLIC BLOOD PRESSURE: 79 MMHG | SYSTOLIC BLOOD PRESSURE: 114 MMHG | OXYGEN SATURATION: 99 %

## 2019-08-21 NOTE — ED TRIAGE NOTES
Pt comes in to the er today report abdominal pain that has been coming and going since Saturday and today it is constant. No vaginal bleeding, had bleeding at the beginning of this pregnancy though. . [facility] No [facility], nausea, normal bm last today, decreased oral intake due to nausea, reports she may be dehydrated, no burning with urination.

## 2019-08-21 NOTE — DISCHARGE INSTRUCTIONS
Get plenty of fluids and rest. take Tylenol as needed for discomfort.  Take your antibiotic as directed.  It is possible that you are suffering from a urinary tract infection that is causing her discomfort, we are culturing her urine and will call you if you need a change of medication.  There were concerning findings on the ultrasound with regards to your fetus.  OB/GYN in [place] was contacted and they should be setting up appointments for you to get further testing and management.  However, I recommend that you call your OB/GYN and  tomorrow to discuss the findings in the emergency department tonight as well as your ongoing abdominal pain symptoms.  Return to the ED if have worsening or concerning symptoms.

## 2019-08-21 NOTE — ED PROVIDER NOTES
History     Chief Complaint   Patient presents with     Abdominal Pain     HPI  Paula Watson is a 24 year old female who presents to the ED today with complaint of abd pain. . 17 weeks pregnant, pt doctors in [facility]. No fevers, nausea, normal bm last today, decreased oral intake due to nausea, reports she may be dehydrated, no burning with urination. No vaginal bleeding, had bleeding at the beginning of this pregnancy though.  She denies fevers, chest pain, shortness of breath.    Allergies:  Allergies   Allergen Reactions     Bees      Buspar [Buspirone] Other (See Comments)     Headache, migraine     Zoloft [Sertraline]        Problem List:    There are no active problems to display for this patient.       Past Medical History:    History reviewed. No pertinent past medical history.    Past Surgical History:    No past surgical history on file.    Family History:    No family history on file.    Social History:  Marital Status:   [2]  Social History     Tobacco Use     Smoking status: Former Smoker     Last attempt to quit: 2019     Years since quittin.3     Smokeless tobacco: Never Used   Substance Use Topics     Alcohol use: Yes     Comment: occ     Drug use: Never        Medications:      aspirin (ASA) 81 MG chewable tablet   cephALEXin (KEFLEX) 500 MG capsule   Prenatal Vit-Fe Fumarate-FA (PRENATAL MULTIVITAMIN W/IRON) 27-0.8 MG tablet   albuterol (PROVENTIL HFA) 108 (90 Base) MCG/ACT inhaler   montelukast (SINGULAIR) 10 MG tablet   norethindrone-ethinyl estradiol (JUNEL FE ) 1-20 MG-MCG tablet         Review of Systems   Constitutional: Negative for fatigue and fever.   Respiratory: Negative for shortness of breath.    Cardiovascular: Negative for chest pain.   Gastrointestinal: Positive for abdominal pain and nausea. Negative for diarrhea and vomiting.   Genitourinary: Negative for difficulty urinating, dysuria, vaginal bleeding, vaginal discharge and vaginal pain.       Physical  "Exam   BP: (!) 143/81  Heart Rate: 108  Temp: 98  F (36.7  C)  Resp: 16  Height: 160 cm (5' 3\")  SpO2: 100 %      Physical Exam   Constitutional: She is oriented to person, place, and time. She appears well-developed and well-nourished. No distress.   HENT:   Head: Normocephalic and atraumatic.   Eyes: Pupils are equal, round, and reactive to light. Conjunctivae and EOM are normal. No scleral icterus.   Neck: Neck supple.   Cardiovascular: Normal rate and regular rhythm.   Pulmonary/Chest: Effort normal and breath sounds normal.   Abdominal: Soft. There is tenderness in the suprapubic area.   Musculoskeletal: She exhibits no deformity.   Lymphadenopathy:     She has no cervical adenopathy.   Neurological: She is alert and oriented to person, place, and time.   Skin: Skin is warm and dry. No rash noted. She is not diaphoretic.   Psychiatric: She has a normal mood and affect. Her behavior is normal.       ED Course        Procedures            Critical Care time:  none            Results for orders placed or performed during the hospital encounter of 08/20/19 (from the past 24 hour(s))   *UA reflex to Microscopic   Result Value Ref Range    Color Urine Yellow     Appearance Urine Clear     Glucose Urine Negative NEG^Negative mg/dL    Bilirubin Urine Negative NEG^Negative    Ketones Urine Negative NEG^Negative mg/dL    Specific Gravity Urine 1.015 1.000 - 1.030    Blood Urine Negative NEG^Negative    pH Urine 6.0 5.0 - 9.0 pH    Protein Albumin Urine Negative NEG^Negative mg/dL    Urobilinogen Urine 0.2 0.2 - 1.0 EU/dL    Nitrite Urine Negative NEG^Negative    Leukocyte Esterase Urine Small (A) NEG^Negative    Source Midstream Urine    Urine Microscopic   Result Value Ref Range    WBC Urine 5-10 (A) OTO5^0 - 5 /HPF    RBC Urine O - 2 OTO2^O - 2 /HPF    Squamous Epithelial /LPF Urine Moderate (A) FEW^Few /LPF    Bacteria Urine Moderate (A) NEG^Negative /HPF   CBC with platelets differential   Result Value Ref Range    " WBC 8.5 4.0 - 11.0 10e9/L    RBC Count 4.45 3.8 - 5.2 10e12/L    Hemoglobin 13.2 11.7 - 15.7 g/dL    Hematocrit 39.2 35.0 - 47.0 %    MCV 88 78 - 100 fl    MCH 29.7 26.5 - 33.0 pg    MCHC 33.7 31.5 - 36.5 g/dL    RDW 14.2 10.0 - 15.0 %    Platelet Count 197 150 - 450 10e9/L    Diff Method Automated Method     % Neutrophils 55.5 %    % Lymphocytes 35.6 %    % Monocytes 6.8 %    % Eosinophils 1.3 %    % Basophils 0.6 %    % Immature Granulocytes 0.2 %    Absolute Neutrophil 4.7 1.6 - 8.3 10e9/L    Absolute Lymphocytes 3.0 0.8 - 5.3 10e9/L    Absolute Monocytes 0.6 0.0 - 1.3 10e9/L    Absolute Eosinophils 0.1 0.0 - 0.7 10e9/L    Absolute Basophils 0.1 0.0 - 0.2 10e9/L    Abs Immature Granulocytes 0.0 0 - 0.4 10e9/L   Comprehensive metabolic panel   Result Value Ref Range    Sodium 136 134 - 144 mmol/L    Potassium 4.0 3.5 - 5.1 mmol/L    Chloride 103 98 - 107 mmol/L    Carbon Dioxide 25 21 - 31 mmol/L    Anion Gap 8 3 - 14 mmol/L    Glucose 84 70 - 105 mg/dL    Urea Nitrogen 8 7 - 25 mg/dL    Creatinine 0.58 (L) 0.60 - 1.20 mg/dL    GFR Estimate >90 >60 mL/min/[1.73_m2]    GFR Estimate If Black >90 >60 mL/min/[1.73_m2]    Calcium 9.3 8.6 - 10.3 mg/dL    Bilirubin Total 0.8 0.3 - 1.0 mg/dL    Albumin 3.8 3.5 - 5.7 g/dL    Protein Total 6.5 6.4 - 8.9 g/dL    Alkaline Phosphatase 62 34 - 104 U/L    ALT 6 (L) 7 - 52 U/L    AST 13 13 - 39 U/L   US OB >14 Weeks Follow Up    Addendum: 8/20/2019      Addendum created by Delfino Lopez MD on 8/20/2019 11:39:13 PM CDT     THIS REPORT CONTAINS FINDINGS THAT MAY BE CRITICAL TO PATIENT CARE. The   findings were verbally communicated via telephone conference with KAREN HATHAWAY at 8/20/2019 11:39 PM CDT. The findings were acknowledged and   understood.      Narrative    Initial report created on 8/20/2019 11:32:51 PM CDT     EXAM:   US Pregnancy After First Trimester, Transabdominal     EXAM DATE/TIME:   8/20/2019 9:37 PM     CLINICAL HISTORY:   24 years old, female; Pregnancy  complicated by abdominal or pelvic pain; Other:   Midline lower abdomen around uterus; Gestational age or lmp: 16w5d; Pregnant;   Additional info: 17 wks preg, abd pain     TECHNIQUE:   Imaging protocol: Real-time transabdominal obstetrical ultrasound of the   maternal pelvis and a second or third trimester pregnancy with image   documentation.     COMPARISON:   No relevant prior studies available.     FINDINGS:    UTERUS: A single, live intrauterine gestation in cephalic presentation. Normal   cardiac activity was seen at 170 bmp. Cervical length is approximately 4.4 cm   with closed internal os without evidence of funneling.    .    Placenta: The placenta is posterior, Grade zero, without evidence of previa or   placental abruption.    Amniotic fluid: The amniotic fluid volume is within normal limits for   estimated gestational age.    .    FETAL MEASUREMENTS:    BPD - 15 weeks and 4 days    HC - 16 weeks and one day    AC - 17 weeks and zero days    FL - 15 weeks and 2 days   .   Composite gestational age is 16 weeks and zero days ? one week with estimated   date of confinement of 2/4/2020. Estimated FETAL BIRTH WEIGHT is 146 g   corresponding to 46 percentile.   .   FETAL SURVEY: Extensive diffuse generalized subcutaneous soft tissue   edema/anasarca-hygroma. Probable bilateral pleural effusions and questionable   trace ascites. Fetal anatomy is otherwise suboptimally visualized.    .    ADNEXA: No adnexal mass or abnormality. No free fluid within the pelvis.       Impression    IMPRESSION:   1. Single viable intrauterine gestation.   2. Extensive diffuse fetal anasarca, probable small pleural effusions and   questionable trace ascites.     THIS DOCUMENT HAS BEEN ELECTRONICALLY SIGNED BY THU NORIEGA MD       Medications   acetaminophen (TYLENOL) tablet 500 mg (500 mg Oral Not Given 8/20/19 2115)   0.9% sodium chloride BOLUS (0 mLs Intravenous Stopped 8/20/19 2215)   prochlorperazine (COMPAZINE) injection 10 mg  (10 mg Intravenous Given 8/20/19 2125)   cephALEXin (KEFLEX) capsule 500 mg (500 mg Oral Given 8/20/19 2303)       Assessments & Plan (with Medical Decision Making)   Pt nontoxic in NAD. Lower abd discomfort. VSS, afebrile. Past appendectomy.     Given compazine, fluids, tylenol. Upon reassessment feeling better.     No leukocytosis. Fairly normal cmp. UA + leuk est, pyuria and bacteria, but with squamous cells.     US showed 1. Single viable intrauterine gestation.   2. Extensive diffuse fetal anasarca, probable small pleural effusions and   questionable trace ascites.     [facility] that the pt has f/u internal fetal medicine consult and pt info given and their schedulers will f/u with pt.     Patient started on Keflex and told that she should follow-up with her OB/GYN as well.  Strict return precautions were given, she understands and agrees with the plan patient is discharged.    Rich Brennan PA-C    I have reviewed the nursing notes.    I have reviewed the findings, diagnosis, plan and need for follow up with the patient.       New Prescriptions    CEPHALEXIN (KEFLEX) 500 MG CAPSULE    Take 1 capsule (500 mg) by mouth 4 times daily for 7 days       Final diagnoses:   Urinary tract infection   Abnormal fetal ultrasound   Abdominal pain in pregnancy       8/20/2019   [facility]     Rich Brennan PA  08/21/19 0009

## 2019-11-11 ENCOUNTER — HOSPITAL ENCOUNTER (EMERGENCY)
Facility: OTHER | Age: 24
Discharge: HOME | End: 2019-11-11
Attending: FAMILY MEDICINE | Admitting: FAMILY MEDICINE
Payer: OTHER MISCELLANEOUS

## 2019-11-11 ENCOUNTER — APPOINTMENT (OUTPATIENT)
Dept: CT IMAGING | Facility: OTHER | Age: 24
End: 2019-11-11
Attending: FAMILY MEDICINE
Payer: OTHER MISCELLANEOUS

## 2019-11-11 ENCOUNTER — APPOINTMENT (OUTPATIENT)
Dept: GENERAL RADIOLOGY | Facility: OTHER | Age: 24
End: 2019-11-11
Attending: FAMILY MEDICINE
Payer: OTHER MISCELLANEOUS

## 2019-11-11 VITALS
TEMPERATURE: 97.7 F | DIASTOLIC BLOOD PRESSURE: 89 MMHG | OXYGEN SATURATION: 99 % | SYSTOLIC BLOOD PRESSURE: 149 MMHG | BODY MASS INDEX: 23.92 KG/M2 | HEART RATE: 87 BPM | WEIGHT: 135 LBS | RESPIRATION RATE: 18 BRPM | HEIGHT: 63 IN
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
## 2019-11-11 DIAGNOSIS — Z77.21 HISTORY OF POTENTIALLY HAZARDOUS BODY FLUID EXPOSURE: ICD-10-CM

## 2019-11-11 DIAGNOSIS — S03.02XA: ICD-10-CM

## 2019-11-11 PROCEDURE — 70450 CT HEAD/BRAIN W/O DYE: CPT

## 2019-11-11 PROCEDURE — 21480 CLTX TMPRMAND DISLC 1ST/SBSQ: CPT | Performed by: FAMILY MEDICINE

## 2019-11-11 PROCEDURE — 21480 CLTX TMPRMAND DISLC 1ST/SBSQ: CPT | Mod: Z6 | Performed by: FAMILY MEDICINE

## 2019-11-11 PROCEDURE — 96372 THER/PROPH/DIAG INJ SC/IM: CPT | Performed by: FAMILY MEDICINE

## 2019-11-11 PROCEDURE — 99284 EMERGENCY DEPT VISIT MOD MDM: CPT | Mod: 25 | Performed by: FAMILY MEDICINE

## 2019-11-11 PROCEDURE — 25000128 H RX IP 250 OP 636: Performed by: FAMILY MEDICINE

## 2019-11-11 PROCEDURE — 99283 EMERGENCY DEPT VISIT LOW MDM: CPT | Mod: Z6 | Performed by: FAMILY MEDICINE

## 2019-11-11 PROCEDURE — 70328 X-RAY EXAM OF JAW JOINT: CPT | Mod: LT

## 2019-11-11 PROCEDURE — 70486 CT MAXILLOFACIAL W/O DYE: CPT

## 2019-11-11 RX ORDER — SULFACETAMIDE SODIUM 100 MG/ML
1-2 SOLUTION/ DROPS OPHTHALMIC
Qty: 1 BOTTLE | Refills: 0 | Status: SHIPPED | OUTPATIENT
Start: 2019-11-11 | End: 2019-11-18

## 2019-11-11 RX ORDER — KETOROLAC TROMETHAMINE 30 MG/ML
60 INJECTION, SOLUTION INTRAMUSCULAR; INTRAVENOUS ONCE
Status: COMPLETED | OUTPATIENT
Start: 2019-11-11 | End: 2019-11-11

## 2019-11-11 RX ADMIN — KETOROLAC TROMETHAMINE 60 MG: 30 INJECTION, SOLUTION INTRAMUSCULAR at 17:56

## 2019-11-11 ASSESSMENT — ENCOUNTER SYMPTOMS
SEIZURES: 0
ADENOPATHY: 0
MUSCULOSKELETAL NEGATIVE: 1
BRUISES/BLEEDS EASILY: 0
SPEECH DIFFICULTY: 0
GASTROINTESTINAL NEGATIVE: 1
PSYCHIATRIC NEGATIVE: 1
DIZZINESS: 0
FACIAL ASYMMETRY: 0
CONSTITUTIONAL NEGATIVE: 1
CARDIOVASCULAR NEGATIVE: 1
RESPIRATORY NEGATIVE: 1
WEAKNESS: 0
HEADACHES: 0
LIGHT-HEADEDNESS: 0
TREMORS: 0
NUMBNESS: 0

## 2019-11-11 ASSESSMENT — MIFFLIN-ST. JEOR: SCORE: 1336.49
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
## 2019-11-11 NOTE — ED AVS SNAPSHOT
[facility]  [address]  [place] [address]  Phone:  [phone]  Fax:  [phone]                                    Paula Watson   MRN: [id number]    Department:  [facility]   Date of Visit:  11/11/2019           After Visit Summary Signature Page    I have received my discharge instructions, and my questions have been answered. I have discussed any challenges I see with this plan with the nurse or doctor.    ..........................................................................................................................................  Patient/Patient Representative Signature      ..........................................................................................................................................  Patient Representative Print Name and Relationship to Patient    ..................................................               ................................................  Date                                   Time    ..........................................................................................................................................  Reviewed by Signature/Title    ...................................................              ..............................................  Date                                               Time          22EPIC Rev 08/18

## 2019-11-12 NOTE — ED NOTES
Care patient turned over to myself pending post reduction x-ray.  I reviewed this do not notice any abnormalities.  Patient is able to open and close her jaw.  Pain is somewhat improved.  She will be discharged home at this time, she was asked to make follow-up appointment in clinic in the next week.  Return here sooner if worse.     Tin Reilly MD  11/11/19 1933

## 2019-11-25 ENCOUNTER — OFFICE VISIT (OUTPATIENT)
Dept: FAMILY MEDICINE | Facility: OTHER | Age: 24
End: 2019-11-25
Attending: CHIROPRACTOR
Payer: OTHER MISCELLANEOUS

## 2019-11-25 VITALS
SYSTOLIC BLOOD PRESSURE: 120 MMHG | BODY MASS INDEX: 24.63 KG/M2 | DIASTOLIC BLOOD PRESSURE: 80 MMHG | HEIGHT: 63 IN | HEART RATE: 76 BPM | RESPIRATION RATE: 16 BRPM | WEIGHT: 139 LBS | TEMPERATURE: 99.5 F | OXYGEN SATURATION: 98 %

## 2019-11-25 DIAGNOSIS — M26.609 TEMPOROMANDIBULAR JOINT DISORDER: Primary | ICD-10-CM

## 2019-11-25 PROCEDURE — 99212 OFFICE O/P EST SF 10 MIN: CPT | Performed by: CHIROPRACTOR

## 2019-11-25 RX ORDER — HYDROXYZINE HYDROCHLORIDE 25 MG/1
1 TABLET, FILM COATED ORAL EVERY 6 HOURS PRN
Refills: 1 | Status: ON HOLD | COMMUNITY
Start: 2019-11-03 | End: 2023-04-21

## 2019-11-25 RX ORDER — ACETAMINOPHEN 325 MG/1
325-650 TABLET ORAL DAILY PRN
COMMUNITY
Start: 2019-08-29 | End: 2020-07-28

## 2019-11-25 ASSESSMENT — PAIN SCALES - GENERAL: PAINLEVEL: MILD PAIN (2)

## 2019-11-25 ASSESSMENT — MIFFLIN-ST. JEOR: SCORE: 1354.63
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
## 2019-11-25 NOTE — NURSING NOTE
Paula Watson is a 24 year old female presenting for initial work comp evaluation for: jaw dislocation  DATE OF INJURY: 11/11/19  Employer:[employer]    Medication Reconciliation: complete    Review Of Systems  Skin: negative  Eyes: negative  Ears/Nose/Throat: negative  Respiratory: No shortness of breath, dyspnea on exertion, cough, or hemoptysis  Cardiovascular: negative  Gastrointestinal: negative  Genitourinary: negative  Musculoskeletal: positive for left sided jaw pain  Neurologic: negative  Psychiatric: negative  Hematologic/Lymphatic/Immunologic: negative  Endocrine: negative    Sangeeta Cortez LPN  11/25/2019 1:45 PM

## 2020-01-09 ENCOUNTER — HOSPITAL ENCOUNTER (OUTPATIENT)
Dept: PHYSICAL THERAPY | Facility: OTHER | Age: 25
Setting detail: THERAPIES SERIES
End: 2020-01-09
Attending: CHIROPRACTOR
Payer: OTHER MISCELLANEOUS

## 2020-01-09 DIAGNOSIS — M26.609 TEMPOROMANDIBULAR JOINT DISORDER: ICD-10-CM

## 2020-01-09 PROCEDURE — 97110 THERAPEUTIC EXERCISES: CPT | Mod: GP | Performed by: PHYSICAL THERAPIST

## 2020-01-09 PROCEDURE — 97161 PT EVAL LOW COMPLEX 20 MIN: CPT | Mod: GP | Performed by: PHYSICAL THERAPIST

## 2020-01-17 NOTE — PROGRESS NOTES
Outpatient Physical Therapy Discharge Note     Patient: Paula Watson  : 1995    Beginning/End Dates of Reporting Period:  20 to 2020    Referring Provider: Dr. Javier    Diagnosis: Left TMJ, s/p jaw dislocation         Client Self Report:  NA  Patient attended evaluation on 20, then no-showed 2 consecutive visits.  Will be discharged per department policy.    Objective Measurements:  NA      Goals:  Goal Identifier HEP   Goal Description Patient will demonstrate correct technique with HEP for increased jaw stability.   Target Date 20   Date Met      Progress:     Goal Identifier lateral deviation   Goal Description Patient will demonstrate equal lateral deviation with no pain, indicating decreased/eliminated muscle spasm.   Target Date 20   Date Met      Progress:     Goal Identifier opening   Goal Description Patient will demonstrate symmetrical opening with no pain, indicating improved mechanics of TMJ.   Target Date 20   Date Met      Progress:     Goal Identifier eating   Goal Description Patient will be able to eat all food choices with no pain or limitation.   Target Date 20   Date Met      Progress:       Progress Toward Goals:   NA          Plan:  Discharge from therapy.    Discharge:    Reason for Discharge:   2 consecutive no-shows    Equipment Issued: none    Discharge Plan: none

## 2020-07-07 ENCOUNTER — VIRTUAL VISIT (OUTPATIENT)
Dept: OBGYN | Facility: OTHER | Age: 25
End: 2020-07-07
Attending: OBSTETRICS & GYNECOLOGY
Payer: COMMERCIAL

## 2020-07-07 VITALS — HEIGHT: 64 IN | WEIGHT: 140 LBS | BODY MASS INDEX: 23.9 KG/M2

## 2020-07-07 DIAGNOSIS — Z32.00 POSSIBLE PREGNANCY, NOT YET CONFIRMED: Primary | ICD-10-CM

## 2020-07-07 PROCEDURE — 99207 ZZC OB VISIT-NO CHARGE - GICH ONLY: CPT | Mod: TEL

## 2020-07-07 SDOH — HEALTH STABILITY: MENTAL HEALTH: HOW MANY STANDARD DRINKS CONTAINING ALCOHOL DO YOU HAVE ON A TYPICAL DAY?: 3 OR 4

## 2020-07-07 SDOH — HEALTH STABILITY: MENTAL HEALTH: HOW OFTEN DO YOU HAVE A DRINK CONTAINING ALCOHOL?: MONTHLY OR LESS

## 2020-07-07 SDOH — HEALTH STABILITY: MENTAL HEALTH: HOW OFTEN DO YOU HAVE 6 OR MORE DRINKS ON ONE OCCASION?: NEVER

## 2020-07-07 ASSESSMENT — MIFFLIN-ST. JEOR: SCORE: 1370.04

## 2020-07-07 ASSESSMENT — PATIENT HEALTH QUESTIONNAIRE - PHQ9: SUM OF ALL RESPONSES TO PHQ QUESTIONS 1-9: 2

## 2020-07-07 NOTE — PROGRESS NOTES
HPI:    This is a 25 year old female patient,  who was called today for OB Intake visit. Patient reports positive pregnancy test at home.     Obstetrical history and OB Demographics updated to the best of this nurse's ability based on patient report. PHQ-9 depression screening and routine Domestic Abuse screening completed. All immediate questions and concerns answered.    Last menstrual period is reported as Patient's last menstrual period was 2020. ISSAC based on LMP is 3/2/2021.  Her cycles are regular.  Her last menstrual period was normal.   Since her LMP, she has experienced  fatigue, pelvic pain and urinary frequency.       Personal OB history includes: age of first pregnancy 22, surgical births 1, G  3 and P  1  Previous OB Provider: Surekha Strange  Previous Delivering Clinic: [facility]  Release of Records: n/a Care Everywhere    Current delivery plan: RCS vs TOLAC at [facility]  Preferred OB Provider: Dr. Seth Morfin MD, FACOG   Current Primary Care Provider: Juan Luis Rivera MD  Pediatrician: Juan Luis Rivera MD    Additional History: Gestational Diabetes Insipidus, hypertension (Possible Preeclampsia) with first pregnancy, 2nd pregnancy demise at 18 weeks d/t fetal hydrops    Have you travelled during the pregnancy?No  Have your sexual partner(s) travelled during the pregnancy?No      HISTORY:   Planned Pregnancy: Yes  Marital Status: Engaged - Milad  Occupation: [employer] at [employer]  Living in Household: Significant Other and Children    Father of the baby is involved.   Family and father of baby is supportive of current pregnancy.  Past Medical History of Father of Baby: Hx of A. Fib and HTN, not chronic, FHx of MI    Past History:  Her past medical history Knee surgery, D&C after 2nd pregnancy, Appendectomy 2014,  19.      She has a history of  preeclampsia, gestational diabetes, controlled by oral medications, prior  section and fetal demise at 18 weeks    Since her  last LMP she denies use of alcohol, tobacco and street drugs.    Pap smear history: YES - updated in Problem List and Health Maintenance accordingly    STD/STI history: No STD history    STD/STI symptoms: no noticeable symptoms     Past medical, surgical, social and family history were reviewed and updated in EPIC.    Medications reviewed by this nurse. Current medication list:  Current Outpatient Medications   Medication Sig Dispense Refill     acetaminophen (TYLENOL) 325 MG tablet Take 325-650 mg by mouth daily as needed       albuterol (PROVENTIL HFA) 108 (90 Base) MCG/ACT inhaler Inhale 1-2 puffs into the lungs every 4 hours as needed       Prenatal Vit-Fe Fumarate-FA (PRENATAL PO)        hydrOXYzine (ATARAX) 25 MG tablet Take 1 tablet by mouth every 6 hours as needed  1     montelukast (SINGULAIR) 10 MG tablet Take 10 mg by mouth At Bedtime       The following medications were recommended to be discontinued due to Pregnancy Category D status: Patient taking only Prenatal as well as inhaler and Tylenol PRN   Patient informed to contact her primary care provider as soon as possible to discuss a safer alternative.    Risk factors:  Moderate and moderately severe risks (consult with OB/Gyn)  Previous fetal or  demise: Yes  History of  delivery: No  History of heart disease Class I: No  Severe anemia, unresponsive to iron therapy: No  Pelvic mass or neoplasm: No  Previous : Yes  Hyper/hypothyroidism: No  History of postpartum hemorrhage requiring transfusion:No  History of Placenta Accreta: No    High Risk (Pregnancy managed by OB/Gyn)  Multiple pregnancy: No  Pre-gestational diabetes: No  Chronic Hypertension: No  Renal Failure: No  Heart disease, class II or greater: No  Rh Isoimmunization: No  Chronic active hepatitis: No  Convulsive disorder, poorly controlled: No  Isoimmune thrombocytopenia: No  Pre-term premature rupture of membranes: No  Lupus or other autoimmune disorder: No  Human  Immunodeficiency Virus: No      ASSESSMENT/PLAN:       ICD-10-CM    1. Possible pregnancy, not yet confirmed  Z32.00        25 year old , 6w0d of pregnancy with ISSAC of 3/2/2021, by Last Menstrual Period    Per standing orders and scope of practice of this nurse, patient will have the following orders placed and completed prior to initial OB visit with the appropriate provider:    --early ultrasound for dating and viability ordered for 6+ weeks gestation based on LMP    --Quantitative Beta HCG and progesterone monitoring if indicated    Counseling given:     - Recommended weight gain for pregnancy: 25-35 lbs.   BMI < 18.5  28-40 lbs   18.5 - 24.9 25-35   25 - 29.9 15-25   > 30  < 15       PLAN/PATIENT INSTRUCTIONS:    Follow up in 2-4 weeks.  Normal exercise.  Normal sexual activity.  Prenatal vitamins.  Anticipated weight gain.    follow-up appointment with Dr. Seth Morfin MD, FACOG for pre- care and take multivitamin or pre-tevin vitamins.    Liliana Lambert RN.................................................. 2020 9:39 AM

## 2020-07-28 ENCOUNTER — PRENATAL OFFICE VISIT (OUTPATIENT)
Dept: OBGYN | Facility: OTHER | Age: 25
End: 2020-07-28
Attending: OBSTETRICS & GYNECOLOGY
Payer: COMMERCIAL

## 2020-07-28 VITALS
WEIGHT: 142.7 LBS | BODY MASS INDEX: 24.49 KG/M2 | HEART RATE: 88 BPM | SYSTOLIC BLOOD PRESSURE: 126 MMHG | DIASTOLIC BLOOD PRESSURE: 76 MMHG

## 2020-07-28 DIAGNOSIS — Z3A.09 9 WEEKS GESTATION OF PREGNANCY: Primary | ICD-10-CM

## 2020-07-28 DIAGNOSIS — O09.91 HIGH-RISK PREGNANCY IN FIRST TRIMESTER: ICD-10-CM

## 2020-07-28 LAB
ALBUMIN UR-MCNC: NEGATIVE MG/DL
APPEARANCE UR: CLEAR
BILIRUB UR QL STRIP: NEGATIVE
C TRACH DNA SPEC QL NAA+PROBE: NOT DETECTED
COLOR UR AUTO: YELLOW
GLUCOSE UR STRIP-MCNC: NEGATIVE MG/DL
HGB UR QL STRIP: NEGATIVE
KETONES UR STRIP-MCNC: NEGATIVE MG/DL
LEUKOCYTE ESTERASE UR QL STRIP: NEGATIVE
N GONORRHOEA DNA SPEC QL NAA+PROBE: NOT DETECTED
NITRATE UR QL: NEGATIVE
PH UR STRIP: 7 PH (ref 5–7)
RBC #/AREA URNS AUTO: 0 /HPF (ref 0–2)
SOURCE: NORMAL
SP GR UR STRIP: 1 (ref 1–1.03)
SPECIMEN SOURCE: NORMAL
UROBILINOGEN UR STRIP-MCNC: NORMAL MG/DL (ref 0–2)
WBC #/AREA URNS AUTO: <1 /HPF (ref 0–5)

## 2020-07-28 PROCEDURE — 87491 CHLMYD TRACH DNA AMP PROBE: CPT | Mod: ZL | Performed by: OBSTETRICS & GYNECOLOGY

## 2020-07-28 PROCEDURE — 87591 N.GONORRHOEAE DNA AMP PROB: CPT | Mod: ZL | Performed by: OBSTETRICS & GYNECOLOGY

## 2020-07-28 PROCEDURE — 85027 COMPLETE CBC AUTOMATED: CPT | Mod: ZL | Performed by: OBSTETRICS & GYNECOLOGY

## 2020-07-28 PROCEDURE — 99207 ZZC OB VISIT-NO CHARGE - GICH ONLY: CPT | Performed by: OBSTETRICS & GYNECOLOGY

## 2020-07-28 PROCEDURE — 87086 URINE CULTURE/COLONY COUNT: CPT | Mod: ZL | Performed by: OBSTETRICS & GYNECOLOGY

## 2020-07-28 PROCEDURE — 76801 OB US < 14 WKS SINGLE FETUS: CPT | Mod: 26 | Performed by: OBSTETRICS & GYNECOLOGY

## 2020-07-28 PROCEDURE — 36415 COLL VENOUS BLD VENIPUNCTURE: CPT | Mod: ZL | Performed by: OBSTETRICS & GYNECOLOGY

## 2020-07-28 PROCEDURE — 81001 URINALYSIS AUTO W/SCOPE: CPT | Mod: ZL | Performed by: OBSTETRICS & GYNECOLOGY

## 2020-07-28 ASSESSMENT — PAIN SCALES - GENERAL: PAINLEVEL: NO PAIN (0)

## 2020-07-28 NOTE — PROGRESS NOTES
CC: New OB visit  HPI:  Paula Argueta is  at 7w5d based on US today. She notes issues of fatigue, nausea which is resolving. Last year she had a second trimester loss with normal chromosomal studies. Fetus was found to have hydrops prior to demise and had normal parvo and cmv testing. Prior to that she had a term  for failure to progress at 8 cm. That pregnancy was complicated by gestational diabetes insipidus and preeclampsia. She was previously on ddavp but is not at present. No current issues with polydipsia or polyuria.     OB History    Para Term  AB Living   3 1 1 0 1 1   SAB TAB Ectopic Multiple Live Births   0 0 0 0 1      # Outcome Date GA Lbr Femi/2nd Weight Sex Delivery Anes PTL Lv   3 Current            2 AB 19 18w1d   M          Birth Comments: Fetal Hydrops   1 Term 18   3.201 kg (7 lb 0.9 oz) M CS-LVertical Gen  LES      Complications: Fetal Intolerance, Failure to Progress in First Stage      Name: ERIC ARGUETA      Apgar1: 7  Apgar5: 8     STI: (denies HSV, Hep C, Hep B, HIV, Syphilis, Chlamydia, Gonorrhea)  Last pap smear: No results found for: PAP  Chickenpox history: immune  No past medical history on file.   has no past surgical history on file.    Social History     Tobacco Use     Smoking status: Never Smoker     Smokeless tobacco: Never Used   Substance Use Topics     Alcohol use: Not Currently     Frequency: Monthly or less     Drinks per session: 3 or 4     Binge frequency: Never     Drug use: Never     No family history on file.      Current Outpatient Medications   Medication     albuterol (PROVENTIL HFA) 108 (90 Base) MCG/ACT inhaler     hydrOXYzine (ATARAX) 25 MG tablet     Prenatal Vit-Fe Fumarate-FA (PRENATAL PO)     No current facility-administered medications for this visit.      Allergies   Allergen Reactions     Bee Venom Anaphylaxis     Buspar [Buspirone] Other (See Comments)     Headache, migraine     Sertraline Muscle Pain (Myalgia)      Morphine Itching     Tolerates HYDROmorphone (DILUDID)     Immunization History   Administered Date(s) Administered     DTAP-IPV/HIB (PENTACEL) 1995, 1996, 02/14/1997     DTaP, Unspecified 02/09/1998, 08/09/2001     HPV Quadrivalent 03/01/2016     HPV9 01/28/2016, 08/18/2016     Hep B, Peds or Adolescent 1995, 1995, 09/01/1998     HepB-Adult 11/22/2019     Hib (PRP-T) 02/09/1998     Influenza Vaccine IM > 6 months Valent IIV4 10/13/2017, 10/11/2018, 11/22/2019     Influenza Vaccine, 6+MO IM (QUADRIVALENT W/PRESERVATIVES) 09/17/2015     MMR 02/09/1998, 08/09/2001     Mantoux Tuberculin Skin Test 09/17/2015, 08/18/2016     Meningococcal (Menactra ) 01/28/2016     Poliovirus, inactivated (IPV) 08/09/2001     TDAP Vaccine (Adacel) 01/28/2016     TDAP Vaccine (Boostrix) 06/25/2018           REVIEW OF SYSTEMS  General: negative  Skin: negative  Resp: No shortness of breath, dyspnea on exertion, cough, or hemoptysis  CV: negative  GI: negative  : negative  Musculoskeletal: negative  Neurologic: negative  Psychiatric: negative  Hematologic: negative  Endocrine: negative    EXAM: /76 (BP Location: Right arm)   Pulse 88   Wt 64.7 kg (142 lb 11.2 oz)   LMP 05/26/2020   Breastfeeding Yes   BMI 24.49 kg/m    Gen: NAD  CV: RRR with normal S1, S2, no GRM  Resp: CTA Bilaterally    Recent Results (from the past 24 hour(s))   US OB < 14 Weeks ( OB/GYN ONLY)    Narrative    Early OB US    Indication: dates and viability    The Turpin Affiniti 50 W Ultrasound machine was used with the C6-2 probe.  With use of realtime 2-D and still images a viable meyer intrauterine gestation is identified.    CRL: 1.34 cm at 7 weeks 5 days with EDC by US of 3/11/2021  Yolk Sac: 3.3 mm      Fetal cardiac activity: 140 bpm by use of m-mode doppler    Uterus: anteverted and normal shape and size    Cul-de-sac: Normal without free fluid  Cervix: Normal without evidence of funneling    I/P: Viable meyer   intrauterine pregnancy at 7 weeks 5 days with US EDC of 3/11/2021 which is not concordant with menstrual date.           I/P  (Z3A.09) 9 weeks gestation of pregnancy  (primary encounter diagnosis)  Comment:   Plan: US OB < 14 Weeks ( OB/GYN ONLY), C US OB 1ST         TRIMESTER MAT/FETAL, SINGLE GESTATION - G[facility]            Discussed safety, nutrition, screening for cystic fibrosis, spina bifida, spinal muscular atrophy, quad screen, cffDNA screening as appropriate.  F/U scheduled, discussed call schedule rotation with [facility] and Dr. Cortez, general surgery.  Return visit in 2 weeks for fetal viability    Pregnancy risk factors include:  Hx of preeclampsia-baby aspirin  gestational DI last pregnancy- monitory for symptoms of polydipsia and polyuria- monitor sodium as indicated  Hx of hydrops and demise with last pregnancy- MFM US and consult- harmony NIPT screen in two weeks.    Seth Morfin MD FACOG  10:53 AM 7/28/2020

## 2020-07-30 LAB
BACTERIA SPEC CULT: NORMAL
SPECIMEN SOURCE: NORMAL

## 2020-08-13 ENCOUNTER — PRENATAL OFFICE VISIT (OUTPATIENT)
Dept: OBGYN | Facility: OTHER | Age: 25
End: 2020-08-13
Attending: OBSTETRICS & GYNECOLOGY
Payer: COMMERCIAL

## 2020-08-13 VITALS
BODY MASS INDEX: 23.74 KG/M2 | SYSTOLIC BLOOD PRESSURE: 108 MMHG | HEART RATE: 82 BPM | WEIGHT: 138.3 LBS | DIASTOLIC BLOOD PRESSURE: 74 MMHG

## 2020-08-13 DIAGNOSIS — O09.91 HIGH-RISK PREGNANCY IN FIRST TRIMESTER: ICD-10-CM

## 2020-08-13 DIAGNOSIS — Z3A.10 10 WEEKS GESTATION OF PREGNANCY: Primary | ICD-10-CM

## 2020-08-13 LAB
ABO + RH BLD: NORMAL
ABO + RH BLD: NORMAL
ANION GAP SERPL CALCULATED.3IONS-SCNC: 6 MMOL/L (ref 3–14)
BLD GP AB SCN SERPL QL: NORMAL
BLOOD BANK CMNT PATIENT-IMP: NORMAL
BUN SERPL-MCNC: 9 MG/DL (ref 7–25)
CALCIUM SERPL-MCNC: 9.5 MG/DL (ref 8.6–10.3)
CHLORIDE SERPL-SCNC: 104 MMOL/L (ref 98–107)
CO2 SERPL-SCNC: 26 MMOL/L (ref 21–31)
CREAT SERPL-MCNC: 0.75 MG/DL (ref 0.6–1.2)
ERYTHROCYTE [DISTWIDTH] IN BLOOD BY AUTOMATED COUNT: 12.5 % (ref 10–15)
GFR SERPL CREATININE-BSD FRML MDRD: >90 ML/MIN/{1.73_M2}
GLUCOSE SERPL-MCNC: 86 MG/DL (ref 70–105)
HCT VFR BLD AUTO: 40.6 % (ref 35–47)
HGB BLD-MCNC: 13.6 G/DL (ref 11.7–15.7)
MCH RBC QN AUTO: 30.9 PG (ref 26.5–33)
MCHC RBC AUTO-ENTMCNC: 33.5 G/DL (ref 31.5–36.5)
MCV RBC AUTO: 92 FL (ref 78–100)
PLATELET # BLD AUTO: 166 10E9/L (ref 150–450)
POTASSIUM SERPL-SCNC: 4.3 MMOL/L (ref 3.5–5.1)
RBC # BLD AUTO: 4.4 10E12/L (ref 3.8–5.2)
SODIUM SERPL-SCNC: 136 MMOL/L (ref 134–144)
SPECIMEN EXP DATE BLD: NORMAL
WBC # BLD AUTO: 6.7 10E9/L (ref 4–11)

## 2020-08-13 PROCEDURE — 86900 BLOOD TYPING SEROLOGIC ABO: CPT | Mod: ZL | Performed by: OBSTETRICS & GYNECOLOGY

## 2020-08-13 PROCEDURE — 87389 HIV-1 AG W/HIV-1&-2 AB AG IA: CPT | Mod: ZL | Performed by: OBSTETRICS & GYNECOLOGY

## 2020-08-13 PROCEDURE — 86780 TREPONEMA PALLIDUM: CPT | Mod: ZL | Performed by: OBSTETRICS & GYNECOLOGY

## 2020-08-13 PROCEDURE — 85027 COMPLETE CBC AUTOMATED: CPT | Mod: ZL | Performed by: OBSTETRICS & GYNECOLOGY

## 2020-08-13 PROCEDURE — 36415 COLL VENOUS BLD VENIPUNCTURE: CPT | Mod: ZL | Performed by: OBSTETRICS & GYNECOLOGY

## 2020-08-13 PROCEDURE — 86762 RUBELLA ANTIBODY: CPT | Mod: ZL | Performed by: OBSTETRICS & GYNECOLOGY

## 2020-08-13 PROCEDURE — 99207 ZZC OB VISIT-NO CHARGE - GICH ONLY: CPT | Performed by: OBSTETRICS & GYNECOLOGY

## 2020-08-13 PROCEDURE — 86901 BLOOD TYPING SEROLOGIC RH(D): CPT | Mod: ZL | Performed by: OBSTETRICS & GYNECOLOGY

## 2020-08-13 PROCEDURE — 86850 RBC ANTIBODY SCREEN: CPT | Mod: ZL | Performed by: OBSTETRICS & GYNECOLOGY

## 2020-08-13 PROCEDURE — 87340 HEPATITIS B SURFACE AG IA: CPT | Mod: ZL | Performed by: OBSTETRICS & GYNECOLOGY

## 2020-08-13 PROCEDURE — 80048 BASIC METABOLIC PNL TOTAL CA: CPT | Mod: ZL | Performed by: OBSTETRICS & GYNECOLOGY

## 2020-08-13 ASSESSMENT — PAIN SCALES - GENERAL: PAINLEVEL: NO PAIN (0)

## 2020-08-13 NOTE — PROGRESS NOTES
CC: Recheck OB visit at 10w0d    HPI: Pal[place] Argueta presents for a routine OB visit now at 10w0d  She has no concerns. Denies cramping, bleeding. [place] test today.    OB History    Para Term  AB Living   3 1 1 0 1 1   SAB TAB Ectopic Multiple Live Births   0 0 0 0 1      # Outcome Date GA Lbr Femi/2nd Weight Sex Delivery Anes PTL Lv   3 Current            2 AB 19 18w1d   M          Birth Comments: Fetal Hydrops   1 Term 18   3.201 kg (7 lb 0.9 oz) M CS-LVertical Gen  LES      Complications: Fetal Intolerance, Failure to Progress in First Stage      Name: ERIC ARGUETA      Apgar1: 7  Apgar5: 8     Current Outpatient Medications   Medication     albuterol (PROVENTIL HFA) 108 (90 Base) MCG/ACT inhaler     hydrOXYzine (ATARAX) 25 MG tablet     Prenatal Vit-Fe Fumarate-FA (PRENATAL PO)     No current facility-administered medications for this visit.          O: /74 (BP Location: Right arm)   Pulse 82   Wt 62.7 kg (138 lb 4.8 oz)   LMP 2020   BMI 23.74 kg/m    Body mass index is 23.74 kg/m .  See OB flow sheet  EXAM:  NAD    Results for orders placed or performed in visit on 20   US OB < 14 Weeks ( OB/GYN ONLY)     Status: None    Narrative    Bedside TA US shows viable meyer IUP at 10 weeks by CRL with FHR visible in normal range.       A/P: (Z3A.10) 10 weeks gestation of pregnancy  (primary encounter diagnosis)  Comment:   Plan: US OB < 14 Weeks ( OB/GYN ONLY), CANCELED: C         US OB 1ST TRIMESTER MAT/FETAL, SINGLE GESTATION        - G[facility]            (O09.91) High-risk pregnancy in first trimester  Comment:   Plan: [place] and NOB labs today      Recheck in 2 weeks      Problem List:   Pregnancy risk factors include:  Hx of preeclampsia-baby aspirin  gestational DI last pregnancy- monitory for symptoms of polydipsia and polyuria- monitor sodium as indicated  Hx of hydrops and demise with last pregnancy- MFM US and consult- harmony NIPT screen in two  weeks.    Seth Morfin MD FACOG  2:27 PM 8/13/2020

## 2020-08-15 LAB
HBV SURFACE AG SERPL QL IA: NONREACTIVE
HIV 1+2 AB+HIV1 P24 AG SERPL QL IA: NONREACTIVE
T PALLIDUM AB SER QL: NONREACTIVE

## 2020-08-17 LAB — RUBV IGG SERPL IA-ACNC: 9 IU/ML

## 2020-08-20 DIAGNOSIS — R30.9 PAINFUL URINATION: Primary | ICD-10-CM

## 2020-08-20 NOTE — PROGRESS NOTES
Patient called with complaints of burning with urination and a low grade fever of 100.2. and some urgency. No other respiratory complaints and patient is not requesting to be seen. UA ordered and patient will come today to leave a urine sample. No further questions or concerns at this time.  Bernarda Cortez RN on 8/20/2020 at 2:55 PM

## 2020-08-21 ENCOUNTER — RESULTS ONLY (OUTPATIENT)
Dept: LAB | Age: 25
End: 2020-08-21

## 2020-08-21 ENCOUNTER — TELEPHONE (OUTPATIENT)
Dept: OBGYN | Facility: OTHER | Age: 25
End: 2020-08-21

## 2020-08-21 ENCOUNTER — OFFICE VISIT (OUTPATIENT)
Dept: FAMILY MEDICINE | Facility: OTHER | Age: 25
End: 2020-08-21
Attending: FAMILY MEDICINE
Payer: COMMERCIAL

## 2020-08-21 VITALS
HEART RATE: 96 BPM | SYSTOLIC BLOOD PRESSURE: 116 MMHG | DIASTOLIC BLOOD PRESSURE: 70 MMHG | RESPIRATION RATE: 16 BRPM | BODY MASS INDEX: 23.56 KG/M2 | TEMPERATURE: 97.9 F | WEIGHT: 137.25 LBS

## 2020-08-21 DIAGNOSIS — R35.0 URINARY FREQUENCY: Primary | ICD-10-CM

## 2020-08-21 LAB
ALBUMIN UR-MCNC: NEGATIVE MG/DL
APPEARANCE UR: ABNORMAL
BILIRUB UR QL STRIP: NEGATIVE
COLOR UR AUTO: ABNORMAL
GLUCOSE UR STRIP-MCNC: NEGATIVE MG/DL
HGB UR QL STRIP: NEGATIVE
HYALINE CASTS #/AREA URNS LPF: 1 /LPF (ref 0–2)
KETONES UR STRIP-MCNC: NEGATIVE MG/DL
LEUKOCYTE ESTERASE UR QL STRIP: NEGATIVE
MUCOUS THREADS #/AREA URNS LPF: PRESENT /LPF
NITRATE UR QL: NEGATIVE
PH UR STRIP: 7 PH (ref 5–7)
RBC #/AREA URNS AUTO: <1 /HPF (ref 0–2)
SOURCE: ABNORMAL
SP GR UR STRIP: 1.02 (ref 1–1.03)
SQUAMOUS #/AREA URNS AUTO: 2 /HPF (ref 0–1)
UROBILINOGEN UR STRIP-MCNC: NORMAL MG/DL (ref 0–2)
WBC #/AREA URNS AUTO: 1 /HPF (ref 0–5)

## 2020-08-21 PROCEDURE — C9803 HOPD COVID-19 SPEC COLLECT: HCPCS

## 2020-08-21 PROCEDURE — U0003 INFECTIOUS AGENT DETECTION BY NUCLEIC ACID (DNA OR RNA); SEVERE ACUTE RESPIRATORY SYNDROME CORONAVIRUS 2 (SARS-COV-2) (CORONAVIRUS DISEASE [COVID-19]), AMPLIFIED PROBE TECHNIQUE, MAKING USE OF HIGH THROUGHPUT TECHNOLOGIES AS DESCRIBED BY CMS-2020-01-R: HCPCS | Mod: ZL

## 2020-08-21 PROCEDURE — 81001 URINALYSIS AUTO W/SCOPE: CPT | Mod: ZL | Performed by: FAMILY MEDICINE

## 2020-08-21 PROCEDURE — 99214 OFFICE O/P EST MOD 30 MIN: CPT | Performed by: FAMILY MEDICINE

## 2020-08-21 ASSESSMENT — PAIN SCALES - GENERAL: PAINLEVEL: NO PAIN (0)

## 2020-08-21 NOTE — NURSING NOTE
"Patient presents to the clinic for fevers with urinary frequency, burning and abdominal discomfort over the past 24 hours.      Urine analysis initiated per verbal orderthat was read back and verified.    Chief Complaint   Patient presents with     Urinary Problem     Fever       Initial /70 (BP Location: Right arm, Patient Position: Sitting, Cuff Size: Adult Regular)   Pulse 96   Temp 97.9  F (36.6  C) (Tympanic)   Resp 16   Wt 62.3 kg (137 lb 4 oz)   LMP 05/26/2020   BMI 23.56 kg/m   Estimated body mass index is 23.56 kg/m  as calculated from the following:    Height as of 7/7/20: 1.626 m (5' 4\").    Weight as of this encounter: 62.3 kg (137 lb 4 oz).  Medication Reconciliation: complete    Hetal Goldman LPN    "

## 2020-08-21 NOTE — TELEPHONE ENCOUNTER
Call placed to notify of normal [place], fetal sex XY.    Liliana Lambert, RN  8/21/2020   3:52 PM

## 2020-08-21 NOTE — PROGRESS NOTES
"CC: Urinary symptoms    HPI: 25-year-old lady presents because of some concerns regarding possible urinary infection.  She states starting 2 days ago, Wednesday evening she had some increased urinary frequency and mild discomfort.  She contacted her doctor yesterday who advised her to come in to have a urinalysis.  She presents today for that urinalysis and it is essentially normal.  Patient also has been concerned because she has had some \"random\" temperature elevations including this morning of 101.  She is taken Tylenol today and presents with a normal temperature presently.  It does not sound like she is having marked urinary symptoms at all nor is she having fever chills or sweats.  A big concern for her is the fact that she is pregnant and during her last pregnancy which ended in a miscarriage at 18 weeks, is that she may be developing a similar problem with this early pregnancy.  She had a Manning test to determine whether there is a Y chromosome.  She did not want to know the answer to that question having been already given an envelope with the results of that test and had not opened the envelope yet.  During the last pregnancy which was her second pregnancy as she also has a child who is almost 2 years old, she had what was thought to be a complication of pregnancy that may have been from a urinary tract origin for infection.  She is going to be seeing her obstetrician in 3 days here in the clinic.    PMH: Medical chart reviewed    ROS: Comprehensively unremarkable other than some mild lower abdominal discomfort intermittently off to both sides.  No GI/ cardiopulmonary complaints    Exam alert cooperative pleasant lady of stated age who does not appear to be in any distress.  Head neck exam grossly normal  Chest clear  Cardiovascular regular rate no murmur  Abdomen soft nontender no rebound no guarding with normal bowel sounds    Lab:   Results for orders placed or performed in visit on 08/21/20   UA " reflex to Microscopic     Status: Abnormal   Result Value Ref Range    Color Urine Light Yellow     Appearance Urine Slightly Cloudy     Glucose Urine Negative NEG^Negative mg/dL    Bilirubin Urine Negative NEG^Negative    Ketones Urine Negative NEG^Negative mg/dL    Specific Gravity Urine 1.018 1.003 - 1.035    Blood Urine Negative NEG^Negative    pH Urine 7.0 5.0 - 7.0 pH    Protein Albumin Urine Negative NEG^Negative mg/dL    Urobilinogen mg/dL Normal 0.0 - 2.0 mg/dL    Nitrite Urine Negative NEG^Negative    Leukocyte Esterase Urine Negative NEG^Negative    Source Midstream Urine     RBC Urine <1 0 - 2 /HPF    WBC Urine 1 0 - 5 /HPF    Squamous Epithelial /HPF Urine 2 (H) 0 - 1 /HPF    Mucous Urine Present (A) NEG^Negative /LPF    Hyaline Casts 1 0 - 2 /LPF     Assessment: Based on the history today's exam and urinalysis it would appear that the patient is not demonstrating clinical findings for a urinary tract infection and therefore  Plan will be simply close follow-up which includes a follow-up with her obstetrician in just 3 days but certainly return here immediately if becomes acutely febrile or significantly worse in any way.

## 2020-08-24 ENCOUNTER — PRENATAL OFFICE VISIT (OUTPATIENT)
Dept: OBGYN | Facility: OTHER | Age: 25
End: 2020-08-24
Attending: OBSTETRICS & GYNECOLOGY
Payer: COMMERCIAL

## 2020-08-24 VITALS
BODY MASS INDEX: 23.93 KG/M2 | DIASTOLIC BLOOD PRESSURE: 78 MMHG | WEIGHT: 139.4 LBS | SYSTOLIC BLOOD PRESSURE: 118 MMHG | HEART RATE: 78 BPM

## 2020-08-24 DIAGNOSIS — G43.819 OTHER MIGRAINE WITHOUT STATUS MIGRAINOSUS, INTRACTABLE: Primary | ICD-10-CM

## 2020-08-24 LAB
SARS-COV-2 RNA SPEC QL NAA+PROBE: NOT DETECTED
SPECIMEN SOURCE: NORMAL

## 2020-08-24 PROCEDURE — 99207 ZZC OB VISIT-NO CHARGE - GICH ONLY: CPT | Performed by: OBSTETRICS & GYNECOLOGY

## 2020-08-24 RX ORDER — BUTALBITAL, ACETAMINOPHEN AND CAFFEINE 50; 325; 40 MG/1; MG/1; MG/1
1 TABLET ORAL EVERY 4 HOURS PRN
Qty: 20 TABLET | Refills: 1 | Status: SHIPPED | OUTPATIENT
Start: 2020-08-24 | End: 2022-01-31

## 2020-08-24 ASSESSMENT — PAIN SCALES - GENERAL: PAINLEVEL: MILD PAIN (2)

## 2020-08-24 NOTE — PROGRESS NOTES
CC: Recheck OB visit at 11w4d    HPI: Paula Argueta presents for a routine OB visit now at 11w4d  She has no concerns. Denies cramping, bleeding, normal fetal movement    OB History    Para Term  AB Living   3 1 1 0 1 1   SAB TAB Ectopic Multiple Live Births   0 0 0 0 1      # Outcome Date GA Lbr Femi/2nd Weight Sex Delivery Anes PTL Lv   3 Current            2 AB 19 18w1d   M          Birth Comments: Fetal Hydrops   1 Term 18   3.201 kg (7 lb 0.9 oz) M CS-LVertical Gen  LES      Complications: Fetal Intolerance, Failure to Progress in First Stage      Name: ERIC ARGUETA      Apgar1: 7  Apgar5: 8     Current Outpatient Medications   Medication     albuterol (PROVENTIL HFA) 108 (90 Base) MCG/ACT inhaler     hydrOXYzine (ATARAX) 25 MG tablet     Prenatal Vit-Fe Fumarate-FA (PRENATAL PO)     No current facility-administered medications for this visit.        O: /78 (BP Location: Right arm)   Pulse 78   Wt 63.2 kg (139 lb 6.4 oz)   LMP 2020   BMI 23.93 kg/m    Body mass index is 23.93 kg/m .  See OB flow sheet  EXAM:  NAD    FHT:140 bpm on bedside US, no sign of hydrops or cystic hygroma    No results found for any visits on 20.    A/P: 11w4d gestation    Recheck in 2 weeks    Problem List:   Pregnancy risk factors include:  Hx of preeclampsia-baby aspirin  gestational DI last pregnancy- monitory for symptoms of polydipsia and polyuria- monitor sodium as indicated  Hx of hydrops and demise with last pregnancy- MFM US and consult- [facility] NIPT screen in two weeks.    Seth Morfin MD FACOG  8:23 AM 2020

## 2020-08-26 ENCOUNTER — RESULTS ONLY (OUTPATIENT)
Dept: LAB | Age: 25
End: 2020-08-26

## 2020-08-27 LAB
SARS-COV-2 RNA SPEC QL NAA+PROBE: NOT DETECTED
SPECIMEN SOURCE: NORMAL

## 2020-09-08 ENCOUNTER — PRENATAL OFFICE VISIT (OUTPATIENT)
Dept: OBGYN | Facility: OTHER | Age: 25
End: 2020-09-08
Attending: OBSTETRICS & GYNECOLOGY
Payer: COMMERCIAL

## 2020-09-08 VITALS
HEART RATE: 80 BPM | DIASTOLIC BLOOD PRESSURE: 72 MMHG | BODY MASS INDEX: 24.44 KG/M2 | SYSTOLIC BLOOD PRESSURE: 128 MMHG | WEIGHT: 142.4 LBS

## 2020-09-08 DIAGNOSIS — Z3A.13 13 WEEKS GESTATION OF PREGNANCY: Primary | ICD-10-CM

## 2020-09-08 PROCEDURE — 99207 ZZC OB VISIT-NO CHARGE - GICH ONLY: CPT | Performed by: OBSTETRICS & GYNECOLOGY

## 2020-09-08 ASSESSMENT — PAIN SCALES - GENERAL: PAINLEVEL: NO PAIN (0)

## 2020-09-08 NOTE — PROGRESS NOTES
CC: Recheck OB visit at 13w5d    HPI: Paula Argueta presents for a routine OB visit now at 13w5d  She has no concerns. Denies cramping, bleeding, normal fetal movement. Denies excessive thirst or urination.    OB History    Para Term  AB Living   3 1 1 0 1 1   SAB TAB Ectopic Multiple Live Births   0 0 0 0 1      # Outcome Date GA Lbr Femi/2nd Weight Sex Delivery Anes PTL Lv   3 Current            2 AB 19 18w1d   M          Birth Comments: Fetal Hydrops   1 Term 18   3.201 kg (7 lb 0.9 oz) M CS-LVertical Gen  LES      Complications: Fetal Intolerance, Failure to Progress in First Stage      Name: ERIC ARGUETA      Apgar1: 7  Apgar5: 8     Current Outpatient Medications   Medication     albuterol (PROVENTIL HFA) 108 (90 Base) MCG/ACT inhaler     butalbital-acetaminophen-caffeine (ESGIC) -40 MG tablet     hydrOXYzine (ATARAX) 25 MG tablet     Prenatal Vit-Fe Fumarate-FA (PRENATAL PO)     No current facility-administered medications for this visit.        O: /72 (BP Location: Right arm)   Pulse 80   Wt 64.6 kg (142 lb 6.4 oz)   LMP 2020   BMI 24.44 kg/m    Body mass index is 24.44 kg/m .  See OB flow sheet  EXAM:  NAD    FHT:180 bpm  Bedside US shows normal size and profile for 13 week gestation.    No results found for any visits on 20.    A/P: (Z3A.13) 13 weeks gestation of pregnancy  (primary encounter diagnosis)  Comment:     Plan:   Recheck in 3 weeks with limited OB for monitoring for signs of hydrops, cystic hygroma.    Problem List:   Pregnancy risk factors include:  Hx of preeclampsia-baby aspirin  gestational DI last pregnancy- monitory for symptoms of polydipsia and polyuria- monitor sodium as indicated  Hx of hydrops and demise with last pregnancy- MFM US and consult- [facility] NIPT screen in two weeks.    Seth Morfin MD FACOG  8:49 AM 2020    flank pain

## 2020-10-01 ENCOUNTER — PRENATAL OFFICE VISIT (OUTPATIENT)
Dept: OBGYN | Facility: OTHER | Age: 25
End: 2020-10-01
Attending: OBSTETRICS & GYNECOLOGY
Payer: COMMERCIAL

## 2020-10-01 VITALS
WEIGHT: 140.8 LBS | DIASTOLIC BLOOD PRESSURE: 70 MMHG | HEART RATE: 78 BPM | SYSTOLIC BLOOD PRESSURE: 112 MMHG | BODY MASS INDEX: 24.17 KG/M2

## 2020-10-01 DIAGNOSIS — O09.292 HISTORY OF FETAL ABNORMALITY IN PREVIOUS PREGNANCY, CURRENTLY PREGNANT, SECOND TRIMESTER: Primary | ICD-10-CM

## 2020-10-01 DIAGNOSIS — Z86.32 HX GESTATIONAL DIABETES: ICD-10-CM

## 2020-10-01 DIAGNOSIS — Z86.39 H/O DIABETES INSIPIDUS: ICD-10-CM

## 2020-10-01 LAB
ALBUMIN UR-MCNC: NEGATIVE MG/DL
ANION GAP SERPL CALCULATED.3IONS-SCNC: 7 MMOL/L (ref 3–14)
APPEARANCE UR: CLEAR
BILIRUB UR QL STRIP: NEGATIVE
BUN SERPL-MCNC: 10 MG/DL (ref 7–25)
CALCIUM SERPL-MCNC: 9.3 MG/DL (ref 8.6–10.3)
CHLORIDE SERPL-SCNC: 105 MMOL/L (ref 98–107)
CO2 SERPL-SCNC: 25 MMOL/L (ref 21–31)
COLOR UR AUTO: YELLOW
CREAT SERPL-MCNC: 0.65 MG/DL (ref 0.6–1.2)
GFR SERPL CREATININE-BSD FRML MDRD: >90 ML/MIN/{1.73_M2}
GLUCOSE SERPL-MCNC: 82 MG/DL (ref 70–105)
GLUCOSE UR STRIP-MCNC: NEGATIVE MG/DL
HGB UR QL STRIP: NEGATIVE
KETONES UR STRIP-MCNC: NEGATIVE MG/DL
LEUKOCYTE ESTERASE UR QL STRIP: NEGATIVE
NITRATE UR QL: NEGATIVE
PH UR STRIP: 7.5 PH (ref 5–7)
POTASSIUM SERPL-SCNC: 4.5 MMOL/L (ref 3.5–5.1)
SODIUM SERPL-SCNC: 137 MMOL/L (ref 134–144)
SODIUM UR-SCNC: 52 MMOL/L (ref 30–90)
SOURCE: ABNORMAL
SP GR UR STRIP: 1.01 (ref 1–1.03)
UROBILINOGEN UR STRIP-MCNC: NORMAL MG/DL (ref 0–2)

## 2020-10-01 PROCEDURE — 76805 OB US >/= 14 WKS SNGL FETUS: CPT | Mod: 26 | Performed by: OBSTETRICS & GYNECOLOGY

## 2020-10-01 PROCEDURE — 99207 PR OB VISIT-NO CHARGE - GICH ONLY: CPT | Performed by: OBSTETRICS & GYNECOLOGY

## 2020-10-01 PROCEDURE — 81003 URINALYSIS AUTO W/O SCOPE: CPT | Mod: ZL | Performed by: OBSTETRICS & GYNECOLOGY

## 2020-10-01 PROCEDURE — 80048 BASIC METABOLIC PNL TOTAL CA: CPT | Mod: ZL | Performed by: OBSTETRICS & GYNECOLOGY

## 2020-10-01 PROCEDURE — 00000219 ZZHCL STATISTIC OBSA - URINALYSIS: Performed by: OBSTETRICS & GYNECOLOGY

## 2020-10-01 PROCEDURE — 36415 COLL VENOUS BLD VENIPUNCTURE: CPT | Mod: ZL | Performed by: OBSTETRICS & GYNECOLOGY

## 2020-10-01 PROCEDURE — 84300 ASSAY OF URINE SODIUM: CPT | Mod: ZL | Performed by: OBSTETRICS & GYNECOLOGY

## 2020-10-01 ASSESSMENT — PAIN SCALES - GENERAL: PAINLEVEL: MILD PAIN (3)

## 2020-10-01 NOTE — PROGRESS NOTES
CC: Recheck OB visit at 17w0d    HPI: Paula Argueta presents for a routine OB visit now at 17w0d  She has no concerns. Denies cramping, bleeding, normal fetal movement.    OB History    Para Term  AB Living   3 1 1 0 1 1   SAB TAB Ectopic Multiple Live Births   0 0 0 0 1      # Outcome Date GA Lbr Femi/2nd Weight Sex Delivery Anes PTL Lv   3 Current            2 AB 19 18w1d   M          Birth Comments: Fetal Hydrops   1 Term 18   3.201 kg (7 lb 0.9 oz) M CS-LVertical Gen  LES      Complications: Fetal Intolerance, Failure to Progress in First Stage      Name: ERIC ARGUETA      Apgar1: 7  Apgar5: 8     Current Outpatient Medications   Medication     albuterol (PROVENTIL HFA) 108 (90 Base) MCG/ACT inhaler     butalbital-acetaminophen-caffeine (ESGIC) -40 MG tablet     hydrOXYzine (ATARAX) 25 MG tablet     Prenatal Vit-Fe Fumarate-FA (PRENATAL PO)     No current facility-administered medications for this visit.          O: /70 (BP Location: Right arm)   Pulse 78   Wt 63.9 kg (140 lb 12.8 oz)   LMP 2020   BMI 24.17 kg/m    Body mass index is 24.17 kg/m .  See OB flow sheet  EXAM:  NAD      Results for orders placed or performed in visit on 10/01/20    OB > 14 Weeks ( OB/GYN ONLY)     Status: None    Narrative    US OB > 14 weeks for Fetal Survey  Indication: Screening Exam    EDC: 3/11/21 at 17w0d    Fetal Number: 1    Presentation: variable  Placenta: anterior not low, no previa    Uterus: normal  Ovaries: normal  Cervix: normal 4 cm long, no funneling    Biometry:  BPD: 3.78 cm 17 weeks 4 days 74th percentile  HC: 14.72 cm 18 weeks 0 days 82nd percentile  AC: 12.99 cm 18 weeks 4 days 91st percentile  FL: 2.31 cm 17 weeks 0 days 42nd percentile    AGA by US: 17 weeks 6 days  AGA established: 2021  EFW: 89th %ile compared to est EDC    Head:   Profile poorly visualized  Face poorly visualized  Nose/Lips poorly visualized  LV not well visualized  Cerebellum not  well visualized  CM not well visualized    Spine:  Long normal  Transverse C-SP normal  T-SP normal  Lumbosacral-SP normal    Chest:  Cardiac Views  RVOT: normal  LVOT: normal  4 chamber: normal  3VV: normal   bicaval view: normal  DA: normal  Aorta: normal    Abdomen:  Diaphragm: normal  Stomach: normal  Kidneys: not well seen  Bladder: normal  Abdominal Wall:  normal  3VC: not well seen      Limbs:  Tib/fib: normal  Femurs: normal  Rad/Ulna: not well seen  Humeri: not well seen  Hands: normal  Feet: normal    Impression: early fetal survey grossly normal, but incomplete due to fetal gestational age and position  Limitations of US discussed, no evidence of cystic hygroma on today's eval.     Sodium, Random Urine     Status: None   Result Value Ref Range    Sodium Random Urine 52 30 - 90 mmol/L       A/P:    (O09.292) History of fetal abnormality in previous pregnancy, currently pregnant, second trimester  Comment:   Plan: [employer] Tele[facility]icine US Comprehensive Single            (Z86.39) H/O diabetes insipidus  Comment:   Plan: Basic Metabolic Panel, UA without Microscopic,         Sodium, Random Urine, CANCELED: Sodium, Random         Urine, CANCELED: UA without Microscopic       Recheck in 4 weeks    Problem List:   Pregnancy risk factors include:  Hx of preeclampsia-baby aspirin  gestational DI last pregnancy- monitory for symptoms of polydipsia and polyuria- monitor sodium as indicated  Hx of hydrops and demise with last pregnancy- MFM US and consult- [facility] NIPT screen in two weeks.      Seth Morfin MD FACOG  10:07 AM 10/1/2020

## 2020-10-03 ENCOUNTER — HOSPITAL ENCOUNTER (EMERGENCY)
Facility: OTHER | Age: 25
Discharge: HOME | End: 2020-10-03
Attending: STUDENT IN AN ORGANIZED HEALTH CARE EDUCATION/TRAINING PROGRAM | Admitting: STUDENT IN AN ORGANIZED HEALTH CARE EDUCATION/TRAINING PROGRAM
Payer: COMMERCIAL

## 2020-10-03 ENCOUNTER — APPOINTMENT (OUTPATIENT)
Dept: ULTRASOUND IMAGING | Facility: OTHER | Age: 25
End: 2020-10-03
Attending: STUDENT IN AN ORGANIZED HEALTH CARE EDUCATION/TRAINING PROGRAM
Payer: COMMERCIAL

## 2020-10-03 VITALS
DIASTOLIC BLOOD PRESSURE: 93 MMHG | HEART RATE: 96 BPM | OXYGEN SATURATION: 100 % | BODY MASS INDEX: 24.03 KG/M2 | WEIGHT: 140 LBS | RESPIRATION RATE: 16 BRPM | SYSTOLIC BLOOD PRESSURE: 135 MMHG | TEMPERATURE: 98.1 F

## 2020-10-03 DIAGNOSIS — O46.90 VAGINAL BLEEDING IN PREGNANCY: ICD-10-CM

## 2020-10-03 LAB
B-HCG SERPL-ACNC: NORMAL IU/L
HGB BLD-MCNC: 12 G/DL (ref 11.7–15.7)

## 2020-10-03 PROCEDURE — 84702 CHORIONIC GONADOTROPIN TEST: CPT | Performed by: STUDENT IN AN ORGANIZED HEALTH CARE EDUCATION/TRAINING PROGRAM

## 2020-10-03 PROCEDURE — 99283 EMERGENCY DEPT VISIT LOW MDM: CPT | Performed by: STUDENT IN AN ORGANIZED HEALTH CARE EDUCATION/TRAINING PROGRAM

## 2020-10-03 PROCEDURE — 85018 HEMOGLOBIN: CPT | Performed by: STUDENT IN AN ORGANIZED HEALTH CARE EDUCATION/TRAINING PROGRAM

## 2020-10-03 PROCEDURE — 76815 OB US LIMITED FETUS(S): CPT | Mod: TC

## 2020-10-03 PROCEDURE — 36415 COLL VENOUS BLD VENIPUNCTURE: CPT | Performed by: STUDENT IN AN ORGANIZED HEALTH CARE EDUCATION/TRAINING PROGRAM

## 2020-10-03 PROCEDURE — 99284 EMERGENCY DEPT VISIT MOD MDM: CPT | Performed by: STUDENT IN AN ORGANIZED HEALTH CARE EDUCATION/TRAINING PROGRAM

## 2020-10-03 ASSESSMENT — ENCOUNTER SYMPTOMS
ABDOMINAL PAIN: 0
DYSURIA: 0
LIGHT-HEADEDNESS: 0
HEMATURIA: 0
NAUSEA: 0
DIARRHEA: 0
VOMITING: 0
BACK PAIN: 1

## 2020-10-03 NOTE — ED AVS SNAPSHOT
[facility]  [address]  [place] [address]  Phone: [phone]  Fax: [phone]                                    Paula Watson   MRN: [id number]    Department: [facility]   Date of Visit: 10/3/2020           After Visit Summary Signature Page    I have received my discharge instructions, and my questions have been answered. I have discussed any challenges I see with this plan with the nurse or doctor.    ..........................................................................................................................................  Patient/Patient Representative Signature      ..........................................................................................................................................  Patient Representative Print Name and Relationship to Patient    ..................................................               ................................................  Date                                   Time    ..........................................................................................................................................  Reviewed by Signature/Title    ...................................................              ..............................................  Date                                               Time          22EPIC Rev 08/18

## 2020-10-03 NOTE — DISCHARGE INSTRUCTIONS
As discussed, your workup was very reassuring including ultrasound. Recommend tylenol as needed for any discomfort. If significant bleeding or severe abdominal/back pain occur - please return for evaluation.

## 2020-10-03 NOTE — ED PROVIDER NOTES
History   No chief complaint on file.    HPI  Paula Watson is a 25 year old  female who presents for vaginal bleeding. History includes 18 wk miscarriage. Patient past two half-dollar sized clots today and another one in the ER. Subsequently she has developed some back pain cramps. Otherwise no complaints.      Allergies:  Allergies   Allergen Reactions     Bee Venom Anaphylaxis     Buspar [Buspirone] Other (See Comments)     Headache, migraine     Sertraline Muscle Pain (Myalgia)     Morphine Itching     Tolerates HYDROmorphone (DILUDID)       Problem List:    There are no active problems to display for this patient.       Past Medical History:    History reviewed. No pertinent past medical history.    Past Surgical History:    No past surgical history on file.    Family History:    No family history on file.    Social History:  Marital Status:   [2]  Social History     Tobacco Use     Smoking status: Never Smoker     Smokeless tobacco: Never Used   Substance Use Topics     Alcohol use: Not Currently     Frequency: Monthly or less     Drinks per session: 3 or 4     Binge frequency: Never     Drug use: Never        Medications:         albuterol (PROVENTIL HFA) 108 (90 Base) MCG/ACT inhaler       butalbital-acetaminophen-caffeine (ESGIC) -40 MG tablet       hydrOXYzine (ATARAX) 25 MG tablet       Prenatal Vit-Fe Fumarate-FA (PRENATAL PO)          Review of Systems   Gastrointestinal: Negative for abdominal pain, diarrhea, nausea and vomiting.   Genitourinary: Positive for vaginal bleeding. Negative for dysuria, hematuria and pelvic pain.   Musculoskeletal: Positive for back pain.   Skin: Negative for rash.   Neurological: Negative for light-headedness.       Physical Exam   BP: (!) 135/93  Pulse: 96  Temp: 98.1  F (36.7  C)  Resp: 16  Weight: 63.5 kg (140 lb)  SpO2: 100 %      Physical Exam  Constitutional:       General: She is not in acute distress.     Appearance: Normal appearance.   HENT:       Head: Normocephalic and atraumatic.      Nose: Nose normal.   Eyes:      Extraocular Movements: Extraocular movements intact.      Conjunctiva/sclera: Conjunctivae normal.   Pulmonary:      Effort: Pulmonary effort is normal.   Abdominal:      General: Bowel sounds are normal.      Tenderness: There is no abdominal tenderness.      Comments: Gravid abdomen   Skin:     General: Skin is warm and dry.   Neurological:      General: No focal deficit present.      Mental Status: She is alert and oriented to person, place, and time.   Psychiatric:         Mood and Affect: Mood normal.         Behavior: Behavior normal.         ED Course     ED Course as of Oct 03 0729   Sat Oct 03, 2020   0323 HCG quantitative pregnancy         Results for orders placed or performed during the hospital encounter of 10/03/20 (from the past 24 hour(s))   Hemoglobin   Result Value Ref Range    Hemoglobin 12.0 11.7 - 15.7 g/dL   HCG quantitative pregnancy   Result Value Ref Range    HCG Quantitative Serum 31,876 IU/L   US OB >14 Weeks Limited wo Fetal Measurement    Narrative    EXAM:    US Pregnancy, Ltd    EXAM DATE/TIME:   10/3/2020 1:51 AM     CLINICAL HISTORY:   Lmp or gestational age (in weeks): 17; Antepartum complications; Bleeding;   Pregnancy complicated by abdominal or pelvic pain; Lower; Second trimester;   Pregnant; Additional info: Passing clots; Bleeding; HX miscarriage    TECHNIQUE:    Real-time obstetrical ultrasound of the maternal pelvis and a pregnancy of   greater than 14 weeks 0 days gestational age.     COMPARISON:    No relevant prior studies available.    FINDINGS:     There is a single, live intrauterine gestation identified.  The placenta is anteriorly situated without evidence of abruption or previa.  The cervix is closed and measures approximately 3.8 cm in length.  An adequate volume of amniotic fluid is subjectively present.  The fetus is positioned in cephalic presentation.  Fetal cardiac activity was  documented at 146 beats/min.      Impression    IMPRESSION:    Live IUP, as described above.    THIS DOCUMENT HAS BEEN ELECTRONICALLY SIGNED BY MOLLY DUMONT MD       Medications - No data to display    Assessments & Plan (with Medical Decision Making)     I have reviewed the nursing notes.    Patient evaluated for vaginal bleeding during pregnancy passing several half-dollar sized clots.  Vitals, hemoglobin stable.  Rh+ from prior lab.  Unremarkable exam.  Transvaginal ultrasound demonstrating single live intrauterine gestation with adequate amniotic fluid and normal cardiac activity.  Reassurance and return precautions and work note provided.    I have reviewed the findings, diagnosis, plan and need for follow up with the patient.    Discharge Medication List as of 10/3/2020  3:59 AM          Final diagnoses:   Vaginal bleeding in pregnancy       10/3/2020   [facility]     Larry Luo MD  10/03/20 0731       Larry Luo MD  10/03/20 0732

## 2020-10-03 NOTE — ED TRIAGE NOTES
Pt presents to ED after passing 2 half-dollar sized clots. Pt states she is 17 weeks pregnant, has had intermittent cramping throughout the day but since passing clots (appx 1 hour ago) pt has had constant cramping. Hx of miscarriage at 18 weeks.   Aislinn Almanza RN

## 2020-10-05 ENCOUNTER — TELEPHONE (OUTPATIENT)
Dept: OBGYN | Facility: OTHER | Age: 25
End: 2020-10-05

## 2020-10-05 ENCOUNTER — PRENATAL OFFICE VISIT (OUTPATIENT)
Dept: OBGYN | Facility: OTHER | Age: 25
End: 2020-10-05
Attending: OBSTETRICS & GYNECOLOGY
Payer: COMMERCIAL

## 2020-10-05 VITALS
SYSTOLIC BLOOD PRESSURE: 106 MMHG | BODY MASS INDEX: 24.13 KG/M2 | WEIGHT: 140.6 LBS | DIASTOLIC BLOOD PRESSURE: 68 MMHG | HEART RATE: 72 BPM

## 2020-10-05 DIAGNOSIS — O46.92 VAGINAL BLEEDING IN PREGNANCY, SECOND TRIMESTER: Primary | ICD-10-CM

## 2020-10-05 PROCEDURE — 76816 OB US FOLLOW-UP PER FETUS: CPT | Performed by: OBSTETRICS & GYNECOLOGY

## 2020-10-05 PROCEDURE — 99207 PR OB VISIT-NO CHARGE - GICH ONLY: CPT | Performed by: OBSTETRICS & GYNECOLOGY

## 2020-10-05 ASSESSMENT — PAIN SCALES - GENERAL: PAINLEVEL: NO PAIN (0)

## 2020-10-05 NOTE — LETTER
[facility]  [address]  [place] [address]  [phone]          October 5, 2020    RE:  Paula Watson                                                                                                                                                       [address] [place] [id number]            To whom it may concern:    Paula Watson is under my professional care for Vaginal bleeding in pregnancy, second trimester  She was excused from work 10/3-10/5/2020 and may return after 10/7/2020 if improved      Sincerely,        Seth Morfin MD FACOG  1:45 PM 10/5/2020

## 2020-10-05 NOTE — PROGRESS NOTES
CC: Recheck OB visit at 17w4d    HPI: Paula Argueta presents for an urgent recheck after having vaginal bleeding this past weekend. She has bled with activity all weekend. US this weekend failed to show any evidence of abruption or previa with a viable IUP which is appropriate for dates.    OB History    Para Term  AB Living   3 1 1 0 1 1   SAB TAB Ectopic Multiple Live Births   0 0 0 0 1      # Outcome Date GA Lbr Femi/2nd Weight Sex Delivery Anes PTL Lv   3 Current            2 AB 19 18w1d   M          Birth Comments: Fetal Hydrops   1 Term 18   3.201 kg (7 lb 0.9 oz) M CS-LVertical Gen  LES      Complications: Fetal Intolerance, Failure to Progress in First Stage      Name: ERIC ARGUETA      Apgar1: 7  Apgar5: 8     Current Outpatient Medications   Medication     albuterol (PROVENTIL HFA) 108 (90 Base) MCG/ACT inhaler     butalbital-acetaminophen-caffeine (ESGIC) -40 MG tablet     hydrOXYzine (ATARAX) 25 MG tablet     Prenatal Vit-Fe Fumarate-FA (PRENATAL PO)     No current facility-administered medications for this visit.          O: /68 (BP Location: Right arm)   Pulse 72   Wt 63.8 kg (140 lb 9.6 oz)   LMP 2020   BMI 24.13 kg/m    Body mass index is 24.13 kg/m .  See OB flow sheet  EXAM:  NAD  Cx is closed and firm, no vaginal bleeding presently.      Results for orders placed or performed in visit on 10/05/20   US OB < 14 Weeks ( OB/GYN ONLY)     Status: None    Narrative    Bedside US shows a viable IUP with normal fetal activity and heart rate. No evidence of abruption or previa, normal AFV. Normal cervix without funneling.    Impression    Continued viability of her pregnancy with normal cervical length.       A/P: (O46.92) Vaginal bleeding in pregnancy, second trimester  (primary encounter diagnosis)  Comment:   Plan: US OB < 14 Weeks ( OB/GYN ONLY), C US OB 1ST         TRIMESTER MAT/FETAL, SINGLE GESTATION - GICH          Recheck in 1 week  Pelvic rest        Problem List:   Pregnancy risk factors include:  Hx of preeclampsia-baby aspirin  gestational DI last pregnancy- monitory for symptoms of polydipsia and polyuria- monitor sodium as indicated  Hx of hydrops and demise with last pregnancy- M US and consult    Seth Morfin MD FACOG  1:27 PM 10/5/2020

## 2020-10-05 NOTE — TELEPHONE ENCOUNTER
Patient called with concerns with spotting and passing clots since Friday evening. States she was seen in ER on Sat. And had an US which showed a viable fetus. No complaints of feve, pain or cramping but has continued to pass a few clots here and there more so when she is up walking around.   Discussed with Dr. Morfin and will see patient today. Patient aware.  Bernarda Cortez RN on 10/5/2020 at 10:48 AM

## 2020-10-12 ENCOUNTER — PRENATAL OFFICE VISIT (OUTPATIENT)
Dept: OBGYN | Facility: OTHER | Age: 25
End: 2020-10-12
Attending: OBSTETRICS & GYNECOLOGY
Payer: COMMERCIAL

## 2020-10-12 VITALS
DIASTOLIC BLOOD PRESSURE: 72 MMHG | WEIGHT: 140.9 LBS | HEART RATE: 80 BPM | BODY MASS INDEX: 24.19 KG/M2 | SYSTOLIC BLOOD PRESSURE: 110 MMHG

## 2020-10-12 DIAGNOSIS — E23.2 DIABETES INSIPIDUS (H): ICD-10-CM

## 2020-10-12 DIAGNOSIS — Z34.02 NORMAL FIRST PREGNANCY CONFIRMED, CURRENTLY IN SECOND TRIMESTER: ICD-10-CM

## 2020-10-12 DIAGNOSIS — O46.92 VAGINAL BLEEDING IN PREGNANCY, SECOND TRIMESTER: Primary | ICD-10-CM

## 2020-10-12 DIAGNOSIS — O99.891 PREGNANCY COMPLICATION BEFORE BIRTH: ICD-10-CM

## 2020-10-12 LAB
ALBUMIN UR-MCNC: NEGATIVE MG/DL
APPEARANCE UR: CLEAR
BACTERIA #/AREA URNS HPF: ABNORMAL /HPF
BILIRUB UR QL STRIP: NEGATIVE
COLOR UR AUTO: YELLOW
GLUCOSE UR STRIP-MCNC: NEGATIVE MG/DL
HGB UR QL STRIP: NEGATIVE
KETONES UR STRIP-MCNC: NEGATIVE MG/DL
LEUKOCYTE ESTERASE UR QL STRIP: NEGATIVE
NITRATE UR QL: NEGATIVE
PH UR STRIP: 7 PH (ref 5–7)
RBC #/AREA URNS AUTO: <1 /HPF (ref 0–2)
SODIUM UR-SCNC: 19 MMOL/L (ref 30–90)
SOURCE: ABNORMAL
SP GR UR STRIP: 1 (ref 1–1.03)
UROBILINOGEN UR STRIP-MCNC: NORMAL MG/DL (ref 0–2)
WBC #/AREA URNS AUTO: <1 /HPF (ref 0–5)

## 2020-10-12 PROCEDURE — 76816 OB US FOLLOW-UP PER FETUS: CPT | Performed by: OBSTETRICS & GYNECOLOGY

## 2020-10-12 PROCEDURE — 99207 PR OB VISIT-NO CHARGE - GICH ONLY: CPT | Performed by: OBSTETRICS & GYNECOLOGY

## 2020-10-12 PROCEDURE — 81001 URINALYSIS AUTO W/SCOPE: CPT | Mod: ZL | Performed by: OBSTETRICS & GYNECOLOGY

## 2020-10-12 PROCEDURE — 84300 ASSAY OF URINE SODIUM: CPT | Mod: ZL | Performed by: OBSTETRICS & GYNECOLOGY

## 2020-10-12 ASSESSMENT — PAIN SCALES - GENERAL: PAINLEVEL: NO PAIN (0)

## 2020-10-12 NOTE — NURSING NOTE
Patient here for prenatal check. She states she had bleeding Wednesday into Thursday and has been dizzy and has been cramping.   She states she passed a clot yesterday that was the size of a quarter.   She states she has started having small contractions starting on Friday evening.     Medication Reconciliation: elijah Galarza, DALIA  10/12/2020 9:44 AM

## 2020-10-12 NOTE — PROGRESS NOTES
CC: Recheck OB visit at 18w4d    HPI: Paula Argueta presents for a routine OB visit now at 18w4d  Feels some movement. Some brown spotting over the last few days. She notes and increase in thirst recently.    OB History    Para Term  AB Living   3 1 1 0 1 1   SAB TAB Ectopic Multiple Live Births   0 0 0 0 1      # Outcome Date GA Lbr Femi/2nd Weight Sex Delivery Anes PTL Lv   3 Current            2 AB 19 18w1d   M          Birth Comments: Fetal Hydrops   1 Term 18   3.201 kg (7 lb 0.9 oz) M CS-LVertical Gen  LES      Complications: Fetal Intolerance, Failure to Progress in First Stage      Name: ERIC ARGUETA      Apgar1: 7  Apgar5: 8     Current Outpatient Medications   Medication     Prenatal Vit-Fe Fumarate-FA (PRENATAL PO)     albuterol (PROVENTIL HFA) 108 (90 Base) MCG/ACT inhaler     butalbital-acetaminophen-caffeine (ESGIC) -40 MG tablet     hydrOXYzine (ATARAX) 25 MG tablet     No current facility-administered medications for this visit.          O: /72 (BP Location: Right arm, Patient Position: Sitting, Cuff Size: Adult Regular)   Pulse 80   Wt 63.9 kg (140 lb 14.4 oz)   LMP 2020   BMI 24.19 kg/m    Body mass index is 24.19 kg/m .  See OB flow sheet  EXAM:  NAD      Results for orders placed or performed in visit on 10/12/20    OB > 14 Weeks ( OB/GYN ONLY)     Status: None    Narrative    Point-of-care ultrasound  Indication: Bleeding in pregnancy, and history of cystic hygroma in previous pregnancy.    Transabdominal ultrasound demonstrates a viable meyer intrauterine gestation.  Amniotic fluid volume is normal.  There is echogenic particulate matter within the fluid suggestive of intra-amniotic bleed.  Cervical length is normal with no evidence of funneling on transabdominal views.  Fetal heart rate is measured at 144 bpm with pulsed-wave Doppler.    BPD 4.15 cm 18 weeks 5 days 51st percentile  HC 16.23 cm 19 weeks 1 day 65th percentile  AC 14.15 cm 19  weeks 4 days 77th percentile  FL 2.76 cm 18 weeks 4 days 39th percentile    Average ultrasound age today at 19 weeks 0 days which compares favorably with her menstrual date of 18 weeks 4 days. Estimated fetal weight is 268 g and this is in the 71st percentile    Normal BPD and head circumference views and a normal nuchal fold failed to show cystic hygroma with this pregnancy.    Impression    Grossly normal anatomy at 18 weeks gestation with adequate growth  Debris within the fluid suggestive of intra-amniotic bleeding previously  We will have her follow-up for an [employer] ultrasound with her history of cystic hygroma and prior diabetes insipidus during her pregnancy.       A/P:   (O46.92) Vaginal bleeding in pregnancy, second trimester  (primary encounter diagnosis)  Comment:   Plan: US OB > 14 Weeks ( OB/GYN ONLY), C US OB 2-3         TRIMESTER MAT/FETAL, SINGLE GESTATION - GICH         (E23.2) Diabetes insipidus (H)  Comment:   Plan: UA with Microscopic, Sodium, Random Urine                Recheck in 2 weeks      Problem List:   Pregnancy risk factors include:  Hx of preeclampsia-baby aspirin  gestational DI last pregnancy- monitory for symptoms of polydipsia and polyuria- monitor sodium as indicated  Hx of hydrops and demise with last pregnancy- [employer] US and consult  Bleeding in pregnancy    Seth Morfin MD FACOG  10:15 AM 10/12/2020

## 2020-10-23 ENCOUNTER — RESULTS ONLY (OUTPATIENT)
Dept: LAB | Age: 25
End: 2020-10-23

## 2020-10-23 LAB
SARS-COV-2 RNA SPEC QL NAA+PROBE: NORMAL
SPECIMEN SOURCE: NORMAL

## 2020-10-24 LAB
LABORATORY COMMENT REPORT: NORMAL
SARS-COV-2 RNA SPEC QL NAA+PROBE: NEGATIVE
SPECIMEN SOURCE: NORMAL

## 2020-10-25 ENCOUNTER — HOSPITAL ENCOUNTER (OUTPATIENT)
Facility: OTHER | Age: 25
Discharge: HOME | End: 2020-10-25
Attending: OBSTETRICS & GYNECOLOGY | Admitting: OBSTETRICS & GYNECOLOGY
Payer: COMMERCIAL

## 2020-10-25 ENCOUNTER — HOSPITAL ENCOUNTER (OUTPATIENT)
Facility: OTHER | Age: 25
End: 2020-10-25
Admitting: OBSTETRICS & GYNECOLOGY
Payer: COMMERCIAL

## 2020-10-25 VITALS
TEMPERATURE: 97.1 F | RESPIRATION RATE: 16 BRPM | DIASTOLIC BLOOD PRESSURE: 85 MMHG | SYSTOLIC BLOOD PRESSURE: 126 MMHG | HEART RATE: 83 BPM

## 2020-10-25 LAB
ALBUMIN UR-MCNC: 10 MG/DL
APPEARANCE UR: CLEAR
BILIRUB UR QL STRIP: NEGATIVE
COLOR UR AUTO: ABNORMAL
GLUCOSE UR STRIP-MCNC: NEGATIVE MG/DL
HGB UR QL STRIP: NEGATIVE
KETONES UR STRIP-MCNC: NEGATIVE MG/DL
LEUKOCYTE ESTERASE UR QL STRIP: NEGATIVE
MUCOUS THREADS #/AREA URNS LPF: PRESENT /LPF
NITRATE UR QL: NEGATIVE
PH UR STRIP: 6 PH (ref 5–7)
RBC #/AREA URNS AUTO: 1 /HPF (ref 0–2)
SOURCE: ABNORMAL
SP GR UR STRIP: 1.03 (ref 1–1.03)
SQUAMOUS #/AREA URNS AUTO: 13 /HPF (ref 0–1)
UROBILINOGEN UR STRIP-MCNC: NORMAL MG/DL (ref 0–2)
WBC #/AREA URNS AUTO: 15 /HPF (ref 0–5)

## 2020-10-25 PROCEDURE — 99214 OFFICE O/P EST MOD 30 MIN: CPT | Performed by: OBSTETRICS & GYNECOLOGY

## 2020-10-25 PROCEDURE — 81001 URINALYSIS AUTO W/SCOPE: CPT | Performed by: OBSTETRICS & GYNECOLOGY

## 2020-10-25 PROCEDURE — 87086 URINE CULTURE/COLONY COUNT: CPT | Performed by: OBSTETRICS & GYNECOLOGY

## 2020-10-25 PROCEDURE — G0463 HOSPITAL OUTPT CLINIC VISIT: HCPCS | Mod: 25

## 2020-10-25 NOTE — PROGRESS NOTES
Obstetrics Triage Note    HPI:  Paula Watson is a 25 year old  female at 20w3d by 7w5d US, pregnancy complicated by hx of IUFD at 18 weeks not due to cervical incompetence and intermittent bleeding throughout this pregnancy, here with complaints of vaginal bleeding.      Patient states that she started having back pain yesterday, doesn't feel like contractions. When she got out of the shower today, she had a small trickle of blood running down her leg.  She put a pad on and didn't have any bleeding on the pad but again had bleeding when she wiped after urinating. + FM.  She denies any dysuria or urinary odor. No constipation or hemorrhoids. Last intercourse 3 days ago.    ROS:  Negative except as mentioned in HPI.    PMH:  Past Medical History:   Diagnosis Date     History of diabetes insipidus     in first pregnancy     History of diet controlled gestational diabetes mellitus (GDM)     first pregnancy     History of pre-eclampsia     first pregnancy       PSHx:  Past Surgical History:   Procedure Laterality Date      SECTION         Medications:  No current facility-administered medications on file prior to encounter.        albuterol (PROVENTIL HFA) 108 (90 Base) MCG/ACT inhaler, Inhale 1-2 puffs into the lungs every 4 hours as needed       butalbital-acetaminophen-caffeine (ESGIC) -40 MG tablet, Take 1 tablet by mouth every 4 hours as needed for headaches       hydrOXYzine (ATARAX) 25 MG tablet, Take 1 tablet by mouth every 6 hours as needed       Prenatal Vit-Fe Fumarate-FA (PRENATAL PO),          Allergies:     Allergies   Allergen Reactions     Bee Venom Anaphylaxis     Buspar [Buspirone] Other (See Comments)     Headache, migraine     Sertraline Muscle Pain (Myalgia)     Morphine Itching     Tolerates HYDROmorphone (DILUDID)       Physical Exam:   There were no vitals filed for this visit.   Gen: resting comfortably, in NAD  Resp: nonlabored  Abd: soft, gravid, non-tender,  non-distended  Pelvic: normal external genitalia. Normal vagina. No blood in vaginal vault. Small amount of thin white discharge, no odor. Cervix visually and digitally closed, firm, long.    NST:  FHT: 145  [place]: quiet    Labs/Imaging:  Results for orders placed or performed during the hospital encounter of 10/25/20 (from the past 24 hour(s))   UA with Microscopic reflex to Culture    Specimen: Urine clean catch; Midstream Urine   Result Value Ref Range    Color Urine Light Yellow     Appearance Urine Clear     Glucose Urine Negative NEG^Negative mg/dL    Bilirubin Urine Negative NEG^Negative    Ketones Urine Negative NEG^Negative mg/dL    Specific Gravity Urine 1.029 1.003 - 1.035    Blood Urine Negative NEG^Negative    pH Urine 6.0 5.0 - 7.0 pH    Protein Albumin Urine 10 (A) NEG^Negative mg/dL    Urobilinogen mg/dL Normal 0.0 - 2.0 mg/dL    Nitrite Urine Negative NEG^Negative    Leukocyte Esterase Urine Negative NEG^Negative    Source Midstream Urine     WBC Urine 15 (H) 0 - 5 /HPF    RBC Urine 1 0 - 2 /HPF    Squamous Epithelial /HPF Urine 13 (H) 0 - 1 /HPF    Mucous Urine Present (A) NEG^Negative /LPF       Assessment/Plan: Paula Watson is a 25 year old  at 20w3d by 7w5d US, here for vaginal bleeding. Reassuring exam and fetal heart tones. Most recent ultrasound images reviewed- anterior fundal placenta. Urine negative for infection. Patient reassured  - Dispo: to home with follow up in the next week. Discussed tylenol for pain as needed. Discussed labor warning signs and indication to return to care.    Alicia Cortez MD  OBGYN  10/25/2020 5:50 PM

## 2020-10-25 NOTE — PROGRESS NOTES
Patient to [facility] with significant other with complaints of 24 hour history of back pain and vaginal bleeding that started approximately several hours ago.  To Room 402.  EUM/EFM applied.  States she feels fetal movement.  The pad she currently has on has no blood on it but she reports when she voided that there was blood on the tissue when she wiped.

## 2020-10-26 NOTE — PROGRESS NOTES
Patient examined by Dr. MARK Cortez.  Given discharge instructions and education handout regarding  labor.  Patient ambulatory to exit with hospital staff.

## 2020-10-27 LAB
BACTERIA SPEC CULT: NORMAL
SPECIMEN SOURCE: NORMAL

## 2020-10-28 DIAGNOSIS — R94.6 THYROID FUNCTION TEST ABNORMAL: Primary | ICD-10-CM

## 2020-10-28 DIAGNOSIS — O99.280: ICD-10-CM

## 2020-10-28 DIAGNOSIS — E23.2: ICD-10-CM

## 2020-10-29 DIAGNOSIS — R94.6 THYROID FUNCTION TEST ABNORMAL: ICD-10-CM

## 2020-10-29 DIAGNOSIS — E23.2: ICD-10-CM

## 2020-10-29 DIAGNOSIS — O99.280: ICD-10-CM

## 2020-10-29 LAB
ANION GAP SERPL CALCULATED.3IONS-SCNC: 9 MMOL/L (ref 3–14)
BUN SERPL-MCNC: 10 MG/DL (ref 7–25)
CALCIUM SERPL-MCNC: 9.2 MG/DL (ref 8.6–10.3)
CHLORIDE SERPL-SCNC: 104 MMOL/L (ref 98–107)
CO2 SERPL-SCNC: 24 MMOL/L (ref 21–31)
CREAT SERPL-MCNC: 0.67 MG/DL (ref 0.6–1.2)
GFR SERPL CREATININE-BSD FRML MDRD: >90 ML/MIN/{1.73_M2}
GLUCOSE SERPL-MCNC: 84 MG/DL (ref 70–105)
OSMOLALITY SERPL: 286 MMOL/KG (ref 275–295)
OSMOLALITY UR: 812 MMOL/KG (ref 100–1200)
POTASSIUM SERPL-SCNC: 3.9 MMOL/L (ref 3.5–5.1)
SODIUM SERPL-SCNC: 137 MMOL/L (ref 134–144)
SODIUM UR-SCNC: 137 MMOL/L (ref 30–90)
T4 FREE SERPL-MCNC: 0.65 NG/DL (ref 0.6–1.6)
TSH SERPL DL<=0.05 MIU/L-ACNC: 3.87 IU/ML (ref 0.34–5.6)

## 2020-10-29 PROCEDURE — 84439 ASSAY OF FREE THYROXINE: CPT | Mod: ZL

## 2020-10-29 PROCEDURE — 83930 ASSAY OF BLOOD OSMOLALITY: CPT | Mod: ZL

## 2020-10-29 PROCEDURE — 84300 ASSAY OF URINE SODIUM: CPT | Mod: ZL

## 2020-10-29 PROCEDURE — 86376 MICROSOMAL ANTIBODY EACH: CPT | Mod: ZL

## 2020-10-29 PROCEDURE — 83935 ASSAY OF URINE OSMOLALITY: CPT | Mod: ZL

## 2020-10-29 PROCEDURE — 80048 BASIC METABOLIC PNL TOTAL CA: CPT | Mod: ZL

## 2020-10-29 PROCEDURE — 36415 COLL VENOUS BLD VENIPUNCTURE: CPT | Mod: ZL

## 2020-10-29 PROCEDURE — 84443 ASSAY THYROID STIM HORMONE: CPT | Mod: ZL

## 2020-10-30 LAB — THYROPEROXIDASE AB SERPL-ACNC: 326 IU/ML

## 2020-11-04 ENCOUNTER — OFFICE VISIT (OUTPATIENT)
Dept: MATERNAL FETAL MEDICINE | Facility: CLINIC | Age: 25
End: 2020-11-04
Attending: OBSTETRICS & GYNECOLOGY
Payer: COMMERCIAL

## 2020-11-04 ENCOUNTER — HOSPITAL ENCOUNTER (OUTPATIENT)
Dept: ULTRASOUND IMAGING | Facility: OTHER | Age: 25
Discharge: HOME | End: 2020-11-04
Attending: OBSTETRICS & GYNECOLOGY | Admitting: OBSTETRICS & GYNECOLOGY
Payer: COMMERCIAL

## 2020-11-04 ENCOUNTER — HOSPITAL ENCOUNTER (OUTPATIENT)
Dept: ULTRASOUND IMAGING | Facility: CLINIC | Age: 25
End: 2020-11-04
Attending: OBSTETRICS & GYNECOLOGY
Payer: COMMERCIAL

## 2020-11-04 DIAGNOSIS — Z86.32 HX GESTATIONAL DIABETES: ICD-10-CM

## 2020-11-04 DIAGNOSIS — Z36.3 SCREENING, ANTENATAL, FOR MALFORMATION BY ULTRASOUND: Primary | ICD-10-CM

## 2020-11-04 DIAGNOSIS — O09.292 HISTORY OF FETAL ABNORMALITY IN PREVIOUS PREGNANCY, CURRENTLY PREGNANT, SECOND TRIMESTER: ICD-10-CM

## 2020-11-04 PROCEDURE — 76811 OB US DETAILED SNGL FETUS: CPT | Mod: 26 | Performed by: OBSTETRICS & GYNECOLOGY

## 2020-11-04 PROCEDURE — 99207 PR NO CHARGE LOS: CPT | Performed by: OBSTETRICS & GYNECOLOGY

## 2020-11-04 NOTE — PROGRESS NOTES
Type of service: Telemedicine Office Visit for prenatal ultrasound    Date of service:  Date: 11/04/20     Time service began:  8:30 AM    Time service ended:  9:00 AM    Reason: .tel: Lack of available service in patient area    Description of basis or telemedicine appropriateness:     Consultation provided at the request of Dr. Morfin for advice regarding the diagnosis and treatment of this patient's pregnancy care.  The patient's condition can be safely assessed via telemedicine.    The Mode of Transmission:    Secure interactive audio and visual telecommunication system (Video Guidance)    Location of originating and distant sites:      Originating site:   [place], [place]    Distant site:    [place], [place]    Abrahan Santos MD

## 2020-11-09 ENCOUNTER — PRENATAL OFFICE VISIT (OUTPATIENT)
Dept: OBGYN | Facility: OTHER | Age: 25
End: 2020-11-09
Attending: OBSTETRICS & GYNECOLOGY
Payer: COMMERCIAL

## 2020-11-09 VITALS
WEIGHT: 147.2 LBS | SYSTOLIC BLOOD PRESSURE: 110 MMHG | DIASTOLIC BLOOD PRESSURE: 70 MMHG | OXYGEN SATURATION: 100 % | BODY MASS INDEX: 25.27 KG/M2 | HEART RATE: 93 BPM

## 2020-11-09 DIAGNOSIS — O09.92 HRP (HIGH RISK PREGNANCY), SECOND TRIMESTER: Primary | ICD-10-CM

## 2020-11-09 PROCEDURE — 99207 PR OB VISIT-NO CHARGE - GICH ONLY: CPT | Performed by: OBSTETRICS & GYNECOLOGY

## 2020-11-09 RX ORDER — EPINEPHRINE 0.3 MG/.3ML
0.3 INJECTION SUBCUTANEOUS
Status: ON HOLD | COMMUNITY
End: 2023-04-21

## 2020-11-09 RX ORDER — LEVOTHYROXINE SODIUM 125 UG/1
62.5 CAPSULE ORAL DAILY
COMMUNITY
Start: 2020-11-04 | End: 2022-01-31

## 2020-11-09 ASSESSMENT — PAIN SCALES - GENERAL: PAINLEVEL: NO PAIN (0)

## 2020-11-09 NOTE — NURSING NOTE
Patient here for prenatal care, states she has been having irregular contractions but otherwise has no questions or concerns.     Medication Reconciliation: complete    Ulysses Galarza LPN  11/9/2020 11:25 AM

## 2020-11-09 NOTE — PROGRESS NOTES
CC: Recheck OB visit at 22w4d    HPI: Paula Argueta presents for a routine OB visit now at 22w4d  She has no concerns. Denies cramping, bleeding, normal fetal movement.    OB History    Para Term  AB Living   3 1 1 0 1 1   SAB TAB Ectopic Multiple Live Births   0 0 0 0 1      # Outcome Date GA Lbr Femi/2nd Weight Sex Delivery Anes PTL Lv   3 Current            2 AB 19 18w1d   M          Birth Comments: Fetal Hydrops   1 Term 18   3.201 kg (7 lb 0.9 oz) M CS-LVertical Gen  LES      Complications: Fetal Intolerance, Failure to Progress in First Stage      Name: ERIC ARGUETA      Apgar1: 7  Apgar5: 8     Current Outpatient Medications   Medication     levothyroxine (SYNTHROID/LEVOTHROID) 50 MCG tablet     albuterol (PROVENTIL HFA) 108 (90 Base) MCG/ACT inhaler     butalbital-acetaminophen-caffeine (ESGIC) -40 MG tablet     EPINEPHrine (ANY BX GENERIC EQUIV) 0.3 MG/0.3ML injection 2-pack     hydrOXYzine (ATARAX) 25 MG tablet     Prenatal Vit-Fe Fumarate-FA (PRENATAL PO)     No current facility-administered medications for this visit.        O: /70 (BP Location: Right arm, Patient Position: Sitting, Cuff Size: Adult Regular)   Pulse 93   Wt 66.8 kg (147 lb 3.2 oz)   LMP 2020   SpO2 100%   BMI 25.27 kg/m    Body mass index is 25.27 kg/m .  See OB flow sheet  EXAM:  NAD  FH:23 cm  FHT: 150 bpm    No results found for any visits on 20.    A/P: 22w4d gestation    Recheck in 4 weeks    Problem List:   Pregnancy risk factors include:  Hx of preeclampsia-baby aspirin  gestational DI last pregnancy- monitory for symptoms of polydipsia and polyuria- monitor sodium as indicated  Hx of hydrops and demise with last pregnancy- MFM US and consult  Bleeding in pregnancy    Seth Morfin MD FACOG  11:39 AM 2020

## 2020-12-03 ENCOUNTER — PRENATAL OFFICE VISIT (OUTPATIENT)
Dept: OBGYN | Facility: OTHER | Age: 25
End: 2020-12-03
Attending: OBSTETRICS & GYNECOLOGY
Payer: COMMERCIAL

## 2020-12-03 VITALS
DIASTOLIC BLOOD PRESSURE: 68 MMHG | HEART RATE: 96 BPM | WEIGHT: 152 LBS | SYSTOLIC BLOOD PRESSURE: 118 MMHG | BODY MASS INDEX: 26.09 KG/M2

## 2020-12-03 DIAGNOSIS — Z3A.26 26 WEEKS GESTATION OF PREGNANCY: Primary | ICD-10-CM

## 2020-12-03 LAB
ERYTHROCYTE [DISTWIDTH] IN BLOOD BY AUTOMATED COUNT: 13.1 % (ref 10–15)
GLUCOSE 1H P 50 G GLC PO SERPL-MCNC: 79 MG/DL (ref 60–129)
HCT VFR BLD AUTO: 37.4 % (ref 35–47)
HGB BLD-MCNC: 12.1 G/DL (ref 11.7–15.7)
MCH RBC QN AUTO: 30 PG (ref 26.5–33)
MCHC RBC AUTO-ENTMCNC: 32.4 G/DL (ref 31.5–36.5)
MCV RBC AUTO: 93 FL (ref 78–100)
PLATELET # BLD AUTO: 201 10E9/L (ref 150–450)
RBC # BLD AUTO: 4.04 10E12/L (ref 3.8–5.2)
T4 FREE SERPL-MCNC: 0.69 NG/DL (ref 0.6–1.6)
TSH SERPL DL<=0.05 MIU/L-ACNC: 2.65 IU/ML (ref 0.34–5.6)
WBC # BLD AUTO: 7.4 10E9/L (ref 4–11)

## 2020-12-03 PROCEDURE — 84443 ASSAY THYROID STIM HORMONE: CPT | Mod: ZL | Performed by: OBSTETRICS & GYNECOLOGY

## 2020-12-03 PROCEDURE — 82950 GLUCOSE TEST: CPT | Mod: ZL | Performed by: OBSTETRICS & GYNECOLOGY

## 2020-12-03 PROCEDURE — 99207 PR OB VISIT-NO CHARGE - GICH ONLY: CPT | Performed by: OBSTETRICS & GYNECOLOGY

## 2020-12-03 PROCEDURE — 90715 TDAP VACCINE 7 YRS/> IM: CPT | Performed by: OBSTETRICS & GYNECOLOGY

## 2020-12-03 PROCEDURE — 86780 TREPONEMA PALLIDUM: CPT | Mod: ZL | Performed by: OBSTETRICS & GYNECOLOGY

## 2020-12-03 PROCEDURE — 85027 COMPLETE CBC AUTOMATED: CPT | Mod: ZL | Performed by: OBSTETRICS & GYNECOLOGY

## 2020-12-03 PROCEDURE — 90471 IMMUNIZATION ADMIN: CPT | Performed by: OBSTETRICS & GYNECOLOGY

## 2020-12-03 PROCEDURE — 84439 ASSAY OF FREE THYROXINE: CPT | Mod: ZL | Performed by: OBSTETRICS & GYNECOLOGY

## 2020-12-03 PROCEDURE — 36415 COLL VENOUS BLD VENIPUNCTURE: CPT | Mod: ZL | Performed by: OBSTETRICS & GYNECOLOGY

## 2020-12-03 ASSESSMENT — PAIN SCALES - GENERAL: PAINLEVEL: NO PAIN (0)

## 2020-12-03 NOTE — NURSING NOTE
Patient here for prenatal care, denies questions or concerns, states baby movement is as normal.     Medication Reconciliation: complete    Ulysses Galarza LPN  12/3/2020 8:22 AM

## 2020-12-03 NOTE — PROGRESS NOTES
CC: Recheck OB visit at 26w0d    HPI: Paula Argueta presents for a routine OB visit now at 26w0d  She has no concerns. Denies cramping, bleeding, normal fetal movement.      OB History    Para Term  AB Living   3 1 1 0 1 1   SAB TAB Ectopic Multiple Live Births   0 0 0 0 1      # Outcome Date GA Lbr Femi/2nd Weight Sex Delivery Anes PTL Lv   3 Current            2 AB 19 18w1d   M          Birth Comments: Fetal Hydrops   1 Term 18   3.201 kg (7 lb 0.9 oz) M CS-LVertical Gen  LES      Complications: Fetal Intolerance, Failure to Progress in First Stage      Name: ERIC ARGUETA      Apgar1: 7  Apgar5: 8     Current Outpatient Medications   Medication     albuterol (PROVENTIL HFA) 108 (90 Base) MCG/ACT inhaler     butalbital-acetaminophen-caffeine (ESGIC) -40 MG tablet     EPINEPHrine (ANY BX GENERIC EQUIV) 0.3 MG/0.3ML injection 2-pack     hydrOXYzine (ATARAX) 25 MG tablet     levothyroxine (SYNTHROID/LEVOTHROID) 50 MCG tablet     Prenatal Vit-Fe Fumarate-FA (PRENATAL PO)     No current facility-administered medications for this visit.          O: /68 (BP Location: Right arm, Patient Position: Sitting, Cuff Size: Adult Regular)   Pulse 96   Wt 68.9 kg (152 lb)   LMP 2020   BMI 26.09 kg/m    Body mass index is 26.09 kg/m .  See OB flow sheet  EXAM:  NAD  FH:26.5 cm  FHT:140 bpm    No results found for any visits on 20.    A/P: (Z3A.26) 26 weeks gestation of pregnancy  (primary encounter diagnosis)  Comment:   Plan: Glucose tolerance, gest screen, 1 hour, CBC W         PLT No Diff, Treponema Ab w Reflex to RPR and         Titer, TSH Reflex GH, T4, Free, Basic Metabolic        Panel              Recheck in 4 weeks    Problem List:   Pregnancy risk factors include:  Hx of preeclampsia-baby aspirin  gestational DI last pregnancy- monitory for symptoms of polydipsia and polyuria- monitor sodium as indicated  Hx of hydrops and demise with last pregnancy- MFM US and  consult  Bleeding in pregnancy    Seth Morfin MD FACOG  8:29 AM 12/3/2020

## 2020-12-04 LAB — T PALLIDUM AB SER QL: NONREACTIVE

## 2020-12-30 ENCOUNTER — PRENATAL OFFICE VISIT (OUTPATIENT)
Dept: OBGYN | Facility: OTHER | Age: 25
End: 2020-12-30
Attending: OBSTETRICS & GYNECOLOGY
Payer: COMMERCIAL

## 2020-12-30 VITALS
WEIGHT: 156.5 LBS | DIASTOLIC BLOOD PRESSURE: 56 MMHG | SYSTOLIC BLOOD PRESSURE: 114 MMHG | RESPIRATION RATE: 10 BRPM | BODY MASS INDEX: 26.86 KG/M2 | HEART RATE: 72 BPM

## 2020-12-30 DIAGNOSIS — Z3A.30 30 WEEKS GESTATION OF PREGNANCY: Primary | ICD-10-CM

## 2020-12-30 PROCEDURE — 99207 PR OB VISIT-NO CHARGE - GICH ONLY: CPT | Performed by: OBSTETRICS & GYNECOLOGY

## 2020-12-30 ASSESSMENT — PAIN SCALES - GENERAL: PAINLEVEL: NO PAIN (0)

## 2020-12-30 NOTE — NURSING NOTE
"Chief Complaint   Patient presents with     Prenatal Care     29W 6D       Initial /56 (BP Location: Right arm, Patient Position: Sitting, Cuff Size: Adult Regular)   Pulse 72   Resp 10   Wt 71 kg (156 lb 8 oz)   LMP 05/26/2020   Breastfeeding No   BMI 26.86 kg/m   Estimated body mass index is 26.86 kg/m  as calculated from the following:    Height as of 7/7/20: 1.626 m (5' 4\").    Weight as of this encounter: 71 kg (156 lb 8 oz).  Medication Reconciliation: Completed     Herb Rhodes LPN  "

## 2020-12-30 NOTE — PROGRESS NOTES
Overall doing well.  No concerns  Infant active  No DI symptoms  Taking levothyroxine  Problem List unchanged

## 2021-01-03 ENCOUNTER — HEALTH MAINTENANCE LETTER (OUTPATIENT)
Age: 26
End: 2021-01-03

## 2021-01-14 ENCOUNTER — PREP FOR PROCEDURE (OUTPATIENT)
Dept: OBGYN | Facility: OTHER | Age: 26
End: 2021-01-14

## 2021-01-14 ENCOUNTER — PRENATAL OFFICE VISIT (OUTPATIENT)
Dept: OBGYN | Facility: OTHER | Age: 26
End: 2021-01-14
Attending: OBSTETRICS & GYNECOLOGY
Payer: COMMERCIAL

## 2021-01-14 VITALS
WEIGHT: 156 LBS | HEART RATE: 80 BPM | BODY MASS INDEX: 26.78 KG/M2 | SYSTOLIC BLOOD PRESSURE: 106 MMHG | DIASTOLIC BLOOD PRESSURE: 62 MMHG

## 2021-01-14 DIAGNOSIS — Z98.891 HISTORY OF C-SECTION: Primary | ICD-10-CM

## 2021-01-14 DIAGNOSIS — K21.00 GASTROESOPHAGEAL REFLUX DISEASE WITH ESOPHAGITIS WITHOUT HEMORRHAGE: Primary | ICD-10-CM

## 2021-01-14 PROCEDURE — 99207 PR OB VISIT-NO CHARGE - GICH ONLY: CPT | Performed by: OBSTETRICS & GYNECOLOGY

## 2021-01-14 RX ORDER — CEFAZOLIN SODIUM 1 G/50ML
1 INJECTION, SOLUTION INTRAVENOUS SEE ADMIN INSTRUCTIONS
Status: CANCELLED | OUTPATIENT
Start: 2021-01-14

## 2021-01-14 RX ORDER — CITRIC ACID/SODIUM CITRATE 334-500MG
30 SOLUTION, ORAL ORAL
Status: CANCELLED | OUTPATIENT
Start: 2021-01-14

## 2021-01-14 RX ORDER — CEFAZOLIN SODIUM 2 G/100ML
2 INJECTION, SOLUTION INTRAVENOUS
Status: CANCELLED | OUTPATIENT
Start: 2021-01-14

## 2021-01-14 RX ORDER — FAMOTIDINE 20 MG/1
20 TABLET, FILM COATED ORAL 2 TIMES DAILY
Qty: 60 TABLET | Refills: 1 | Status: SHIPPED | OUTPATIENT
Start: 2021-01-14 | End: 2021-04-01

## 2021-01-14 RX ORDER — LIDOCAINE 40 MG/G
CREAM TOPICAL
Status: CANCELLED | OUTPATIENT
Start: 2021-01-14

## 2021-01-14 RX ORDER — SODIUM CHLORIDE, SODIUM LACTATE, POTASSIUM CHLORIDE, CALCIUM CHLORIDE 600; 310; 30; 20 MG/100ML; MG/100ML; MG/100ML; MG/100ML
INJECTION, SOLUTION INTRAVENOUS CONTINUOUS
Status: CANCELLED | OUTPATIENT
Start: 2021-01-14

## 2021-01-14 ASSESSMENT — PAIN SCALES - GENERAL: PAINLEVEL: NO PAIN (0)

## 2021-01-14 NOTE — PROGRESS NOTES
CC: Recheck OB visit at 32w0d    HPI: Paula Argueta presents for a routine OB visit now at 32w0d  She has no concerns. Denies cramping, bleeding, normal fetal movement. Struggling with nausea and reflux.    OB History    Para Term  AB Living   3 1 1 0 1 1   SAB TAB Ectopic Multiple Live Births   0 0 0 0 1      # Outcome Date GA Lbr Femi/2nd Weight Sex Delivery Anes PTL Lv   3 Current            2 AB 19 18w1d   M          Birth Comments: Fetal Hydrops   1 Term 18   3.201 kg (7 lb 0.9 oz) M CS-LVertical Gen  LES      Complications: Fetal Intolerance, Failure to Progress in First Stage      Name: ERIC ARGUETA      Apgar1: 7  Apgar5: 8     Current Outpatient Medications   Medication     albuterol (PROVENTIL HFA) 108 (90 Base) MCG/ACT inhaler     butalbital-acetaminophen-caffeine (ESGIC) -40 MG tablet     EPINEPHrine (ANY BX GENERIC EQUIV) 0.3 MG/0.3ML injection 2-pack     hydrOXYzine (ATARAX) 25 MG tablet     levothyroxine (TIROSINT) 125 MCG capsule     Prenatal Vit-Fe Fumarate-FA (PRENATAL PO)     No current facility-administered medications for this visit.          O: /62 (BP Location: Right arm, Patient Position: Sitting, Cuff Size: Adult Regular)   Pulse 80   Wt 70.8 kg (156 lb)   LMP 2020   Breastfeeding No   BMI 26.78 kg/m    Body mass index is 26.78 kg/m .  See OB flow sheet  EXAM:  NAD  FH:32 cm  FHT:150 bpm    No results found for any visits on 21.    A/P: 32w0d    (K21.00) Gastroesophageal reflux disease with esophagitis without hemorrhage  (primary encounter diagnosis)  Comment:   Plan: famotidine (PEPCID) 20 MG tablet            Recheck in 2 weeks    Problem List:   Pregnancy risk factors include:  Hx of preeclampsia-baby aspirin  gestational DI last pregnancy- monitory for symptoms of polydipsia and polyuria- monitor sodium as indicated  Hx of hydrops and demise with last pregnancy- MFM US and consult  Bleeding in pregnancy    Seth Morfin MD  FACOG  8:56 AM 1/14/2021

## 2021-01-14 NOTE — NURSING NOTE
Chief Complaint   Patient presents with     Prenatal Care     32w0d     Has been having nausea and vomiting X2 days   Melva Colindres LPN........................1/14/2021  8:36 AM     Medication Reconciliation: completed   Melva Colindres LPN  1/14/2021 8:34 AM

## 2021-01-17 ENCOUNTER — HOSPITAL ENCOUNTER (OUTPATIENT)
Facility: OTHER | Age: 26
LOS: 1 days | Discharge: HOME | End: 2021-01-17
Attending: OBSTETRICS & GYNECOLOGY | Admitting: OBSTETRICS & GYNECOLOGY
Payer: COMMERCIAL

## 2021-01-17 VITALS
RESPIRATION RATE: 16 BRPM | SYSTOLIC BLOOD PRESSURE: 134 MMHG | TEMPERATURE: 96.5 F | HEART RATE: 98 BPM | OXYGEN SATURATION: 100 % | DIASTOLIC BLOOD PRESSURE: 78 MMHG

## 2021-01-17 DIAGNOSIS — Z98.891 HISTORY OF C-SECTION: ICD-10-CM

## 2021-01-17 PROBLEM — Z36.89 ENCOUNTER FOR TRIAGE IN PREGNANT PATIENT: Status: ACTIVE | Noted: 2020-10-25

## 2021-01-17 PROCEDURE — G0463 HOSPITAL OUTPT CLINIC VISIT: HCPCS

## 2021-01-17 PROCEDURE — 99214 OFFICE O/P EST MOD 30 MIN: CPT | Performed by: OBSTETRICS & GYNECOLOGY

## 2021-01-17 PROCEDURE — 250N000013 HC RX MED GY IP 250 OP 250 PS 637: Performed by: OBSTETRICS & GYNECOLOGY

## 2021-01-17 RX ORDER — ACETAMINOPHEN 325 MG/1
650 TABLET ORAL EVERY 4 HOURS PRN
Status: DISCONTINUED | OUTPATIENT
Start: 2021-01-17 | End: 2021-01-17 | Stop reason: HOSPADM

## 2021-01-17 RX ADMIN — ACETAMINOPHEN 650 MG: 325 TABLET, FILM COATED ORAL at 10:08

## 2021-01-17 NOTE — PROGRESS NOTES
"Pt is feeling better, still has some slight cramping and generalized \"soreness\" Category I strip maintained with occasional contraction. Will continue to monitor and provide interventions as needed.   "

## 2021-01-17 NOTE — H&P
[facility]    History and Physical  Obstetrics and Gynecology     Date of Admission:  2021    Assessment & Plan   Paula Watson is a 25 year old female who presents with recent fall onto her right side on the way to her truck this AM  ASSESSMENT:   IUP @ 32w3d for observation.  NST reactive.  Category  I    PLAN:   Observation  Monitor for four hours  Home to rest if stable  Fioricet for headache    Seth Morfin    History of Present Illness   Paula Watson is a 25 year old female  32w3d  Estimated Date of Delivery: Mar 11, 2021 is calculated from Patient's last menstrual period was 2020. [facility].  She had a fall on the ice on the way to her truck this AM landing on her right side. She did not injur herself or strike her head. Baby is active. No vaginal bleeding. She is rarely estela. She notes a headache this AM as well. She has not eaten or drank anything. She uses Fioricet prn for headache.    PRENATAL COURSE  Prenatal course was complicated by history of , history of anomalous fetus, history of gestational diabetes insipidus, mild hypothyroidism      Recent Labs   Lab Test 20  1427   ABO A   RH Pos   AS Neg     Rhogam not indicated   Recent Labs   Lab Test 20  1426   HEPBANG Nonreactive   HIAGAB Nonreactive   RUQIGG 9       Past Medical History    I have reviewed this patient's medical history and updated it with pertinent information if needed.   Past Medical History:   Diagnosis Date     History of diabetes insipidus     in first pregnancy     History of diet controlled gestational diabetes mellitus (GDM)     first pregnancy     History of pre-eclampsia     first pregnancy       Past Surgical History   I have reviewed this patient's surgical history and updated it with pertinent information if needed.  Past Surgical History:   Procedure Laterality Date      SECTION         Prior to Admission  Medications   Prior to Admission Medications   Prescriptions Last Dose Informant Patient Reported? Taking?   EPINEPHrine (ANY BX GENERIC EQUIV) 0.3 MG/0.3ML injection 2-pack   Yes No   Sig: Inject 0.3 mg into the muscle   Prenatal Vit-Fe Fumarate-FA (PRENATAL PO)   Yes No   albuterol (PROVENTIL HFA) 108 (90 Base) MCG/ACT inhaler   Yes No   Sig: Inhale 1-2 puffs into the lungs every 4 hours as needed   butalbital-acetaminophen-caffeine (ESGIC) -40 MG tablet   No No   Sig: Take 1 tablet by mouth every 4 hours as needed for headaches   famotidine (PEPCID) 20 MG tablet   No No   Sig: Take 1 tablet (20 mg) by mouth 2 times daily   hydrOXYzine (ATARAX) 25 MG tablet   Yes No   Sig: Take 1 tablet by mouth every 6 hours as needed   levothyroxine (TIROSINT) 125 MCG capsule   Yes No   Sig: Take 125 mcg by mouth daily       Facility-Administered Medications: None     Allergies   Allergies   Allergen Reactions     Bee Venom Anaphylaxis     Morphine Itching and Hives     Tolerates HYDROmorphone (DILUDID)  Tolerates HYDROmorphone (DILUDID)     Buspirone Other (See Comments)     Headache, migraine  migraines     Sertraline Muscle Pain (Myalgia)       Social History   I have reviewed this patient's social history and updated it with pertinent information if needed. Paula Watson  reports that she has never smoked. She has never used smokeless tobacco. She reports previous alcohol use. She reports that she does not use drugs.    Family History   I have reviewed this patient's family history and updated it with pertinent information if needed.   No family history on file.    Immunization History   Immunizations are up to date    Physical Exam   Temp: 96.5  F (35.8  C) Temp src: Temporal BP: 134/78 Pulse: 98   Resp: 16 SpO2: 100 % O2 Device: None (Room air)    Vital Signs with Ranges  Temp:  [96.5  F (35.8  C)] 96.5  F (35.8  C)  Pulse:  [98] 98  Resp:  [16] 16  BP: (134-141)/(78-85) 134/78  SpO2:  [91 %-100 %] 100 %    Abdomen:  gravid, soft and NT, ND  Fetal Heart Tones: 140 baseline, moderate variablility, + accels, no decels and Category I  TOCO:   frequency q 10 minutes, some irritability, irregular    Constitutional: healthy, alert, active and no distress

## 2021-01-17 NOTE — PROGRESS NOTES
Admission Note    Data:  Paula Watson admitted to [facility] from home at 0635.      Action:  Dr. Morfin has been notified of admission. Pt oriented to unit, call light in reach.     Response:  Patient 32.3 weeks gestation wally reports that she was on her way to work this morning at 0600 and slipped on ice while getting into her car and fell/landed on the left side of her abdomen. Reports slight headache, consistent with typical morning headaches she has daily. [place] felder contractions that are irregular. Applied EFM and EUM. Reports not pain, however cramping at times of the lower abdomen. Assessment completed. Negative. Vitals stable with slightly elevated HR and BP.  with accels present and no decels at this time. No contractions noted.  Frequent monitoring continued. Writer gave report to oncoming staff to update MD. Narciso Kilgore, RN 01/17/21 6:59 AM

## 2021-01-17 NOTE — PROGRESS NOTES
Pt has had 4.5 hours of fetal monitoring post fall. Category I tracing noted entire stay, no decels. Pt has had occasional contraction. Dr. Morfin aware and gave order to discharge pt to home. Pt instructed to return to [facility] if LOF, vaginal bleeding, increased contractions, decreased fetal movement and abdominal pain. Pt is comfortable with this plan. She is discharged via ambulatory.

## 2021-01-28 ENCOUNTER — PRENATAL OFFICE VISIT (OUTPATIENT)
Dept: OBGYN | Facility: OTHER | Age: 26
End: 2021-01-28
Attending: OBSTETRICS & GYNECOLOGY
Payer: COMMERCIAL

## 2021-01-28 VITALS — DIASTOLIC BLOOD PRESSURE: 82 MMHG | SYSTOLIC BLOOD PRESSURE: 130 MMHG | WEIGHT: 157.4 LBS | BODY MASS INDEX: 27.02 KG/M2

## 2021-01-28 DIAGNOSIS — R51.9 PREGNANCY HEADACHE IN THIRD TRIMESTER: Primary | ICD-10-CM

## 2021-01-28 DIAGNOSIS — O26.893 PREGNANCY HEADACHE IN THIRD TRIMESTER: Primary | ICD-10-CM

## 2021-01-28 LAB
ALBUMIN MFR UR ELPH: 26 MG/DL (ref 1–14)
CREAT UR-MCNC: 136 MG/DL
PROT/CREAT 24H UR: 0.19 MG/G{CREAT}

## 2021-01-28 PROCEDURE — 84156 ASSAY OF PROTEIN URINE: CPT | Mod: ZL | Performed by: OBSTETRICS & GYNECOLOGY

## 2021-01-28 PROCEDURE — 99207 PR OB VISIT-NO CHARGE - GICH ONLY: CPT | Performed by: OBSTETRICS & GYNECOLOGY

## 2021-01-28 ASSESSMENT — PAIN SCALES - GENERAL: PAINLEVEL: MILD PAIN (3)

## 2021-01-28 NOTE — NURSING NOTE
Patient here for prenatal care, denies questions or concerns.     Medication Reconciliation: complete    Ulysses Galarza LPN  1/28/2021 8:54 AM

## 2021-01-28 NOTE — PROGRESS NOTES
CC: Recheck OB visit at 34w0d    HPI: Paula Argueta presents for a routine OB visit now at 34w0d  She has a headache today. Denies cramping, bleeding, normal fetal movement. Thirst is normal to slightly increased, and urination is normal volumes. She has history of preeclampsia.    OB History    Para Term  AB Living   3 1 1 0 1 1   SAB TAB Ectopic Multiple Live Births   0 0 0 0 1      # Outcome Date GA Lbr Femi/2nd Weight Sex Delivery Anes PTL Lv   3 Current            2 AB 19 18w1d   M          Birth Comments: Fetal Hydrops   1 Term 18   3.201 kg (7 lb 0.9 oz) M CS-LVertical Gen  LES      Complications: Fetal Intolerance, Failure to Progress in First Stage      Name: ERIC ARGUETA      Apgar1: 7  Apgar5: 8     Current Outpatient Medications   Medication     Prenatal Vit-Fe Fumarate-FA (PRENATAL PO)     albuterol (PROVENTIL HFA) 108 (90 Base) MCG/ACT inhaler     butalbital-acetaminophen-caffeine (ESGIC) -40 MG tablet     EPINEPHrine (ANY BX GENERIC EQUIV) 0.3 MG/0.3ML injection 2-pack     famotidine (PEPCID) 20 MG tablet     hydrOXYzine (ATARAX) 25 MG tablet     levothyroxine (TIROSINT) 125 MCG capsule     No current facility-administered medications for this visit.          O: /82 (BP Location: Left arm, Patient Position: Sitting, Cuff Size: Adult Regular)   Wt 71.4 kg (157 lb 6.4 oz)   LMP 2020   BMI 27.02 kg/m    Body mass index is 27.02 kg/m .  See OB flow sheet  EXAM:  NAD  FH:34 cm  FHT:155 bpm    No results found for any visits on 21.    A/P: 34w0d gestation  Recheck in 1 week    (O26.893,  R51.9) Pregnancy headache in third trimester  (primary encounter diagnosis)  Comment:   Plan: Protein Random Urine            Problem List:   Pregnancy risk factors include:  Hx of preeclampsia-baby aspirin  gestational DI last pregnancy- monitory for symptoms of polydipsia and polyuria- monitor sodium as indicated  Hx of hydrops and demise with last pregnancy- MFM US and  consult  Bleeding in pregnancy    Seth Morfin MD FACOG  9:12 AM 1/28/2021

## 2021-02-04 ENCOUNTER — PRENATAL OFFICE VISIT (OUTPATIENT)
Dept: OBGYN | Facility: OTHER | Age: 26
End: 2021-02-04
Attending: OBSTETRICS & GYNECOLOGY
Payer: COMMERCIAL

## 2021-02-04 VITALS
BODY MASS INDEX: 27.65 KG/M2 | HEART RATE: 86 BPM | SYSTOLIC BLOOD PRESSURE: 130 MMHG | WEIGHT: 161.1 LBS | OXYGEN SATURATION: 99 % | DIASTOLIC BLOOD PRESSURE: 90 MMHG

## 2021-02-04 DIAGNOSIS — O13.3 GESTATIONAL HYPERTENSION, THIRD TRIMESTER: Primary | ICD-10-CM

## 2021-02-04 LAB
ALBUMIN MFR UR ELPH: <4 MG/DL (ref 1–14)
CREAT UR-MCNC: 13 MG/DL
PROT/CREAT 24H UR: 0 MG/G{CREAT}

## 2021-02-04 PROCEDURE — 84156 ASSAY OF PROTEIN URINE: CPT | Mod: ZL | Performed by: OBSTETRICS & GYNECOLOGY

## 2021-02-04 PROCEDURE — 99207 PR OB VISIT-NO CHARGE - GICH ONLY: CPT | Performed by: OBSTETRICS & GYNECOLOGY

## 2021-02-04 ASSESSMENT — PAIN SCALES - GENERAL: PAINLEVEL: NO PAIN (0)

## 2021-02-04 NOTE — NURSING NOTE
Patient here for prenatal care, denies questions or concerns.   Medication Reconciliation: complete    Ulysses Galarza LPN  2/4/2021 1:28 PM

## 2021-02-04 NOTE — PROGRESS NOTES
CC: Recheck OB visit at 35w0d    HPI: Paula Argueta presents for a routine OB visit now at 35w0d  She has no concerns. Denies cramping, bleeding, normal fetal movement. No headaches or RUQ pain.    OB History    Para Term  AB Living   3 1 1 0 1 1   SAB TAB Ectopic Multiple Live Births   0 0 0 0 1      # Outcome Date GA Lbr Femi/2nd Weight Sex Delivery Anes PTL Lv   3 Current            2 AB 19 18w1d   M          Birth Comments: Fetal Hydrops   1 Term 18   3.201 kg (7 lb 0.9 oz) M CS-LVertical Gen  LES      Complications: Fetal Intolerance, Failure to Progress in First Stage      Name: ERIC ARGUETA      Apgar1: 7  Apgar5: 8     Current Outpatient Medications   Medication     albuterol (PROVENTIL HFA) 108 (90 Base) MCG/ACT inhaler     butalbital-acetaminophen-caffeine (ESGIC) -40 MG tablet     EPINEPHrine (ANY BX GENERIC EQUIV) 0.3 MG/0.3ML injection 2-pack     famotidine (PEPCID) 20 MG tablet     hydrOXYzine (ATARAX) 25 MG tablet     levothyroxine (TIROSINT) 125 MCG capsule     Prenatal Vit-Fe Fumarate-FA (PRENATAL PO)     No current facility-administered medications for this visit.          O: BP (!) 130/90 (BP Location: Right arm, Patient Position: Sitting, Cuff Size: Adult Regular)   Pulse 86   Wt 73.1 kg (161 lb 1.6 oz)   LMP 2020   SpO2 99%   BMI 27.65 kg/m    Body mass index is 27.65 kg/m .  See OB flow sheet  EXAM:  NAD  FH:35 cm  FHT: 135 bpm    No results found for any visits on 21.    A/P: 35w0d    Recheck in 1 week  Mild Gest HTN    Problem List:   Pregnancy risk factors include:  Hx of preeclampsia-baby aspirin  gestational DI last pregnancy- monitory for symptoms of polydipsia and polyuria- monitor sodium as indicated  Hx of hydrops and demise with last pregnancy- MFM US and consult  Bleeding in pregnancy    Seth Morfin MD FACOG  1:42 PM 2021

## 2021-02-11 ENCOUNTER — PRENATAL OFFICE VISIT (OUTPATIENT)
Dept: OBGYN | Facility: OTHER | Age: 26
End: 2021-02-11
Attending: OBSTETRICS & GYNECOLOGY
Payer: COMMERCIAL

## 2021-02-11 VITALS
DIASTOLIC BLOOD PRESSURE: 72 MMHG | HEART RATE: 88 BPM | SYSTOLIC BLOOD PRESSURE: 124 MMHG | BODY MASS INDEX: 27.64 KG/M2 | WEIGHT: 161 LBS

## 2021-02-11 PROCEDURE — 99207 PR OB VISIT-NO CHARGE - GICH ONLY: CPT | Performed by: OBSTETRICS & GYNECOLOGY

## 2021-02-11 PROCEDURE — 87081 CULTURE SCREEN ONLY: CPT | Mod: ZL | Performed by: OBSTETRICS & GYNECOLOGY

## 2021-02-11 ASSESSMENT — PAIN SCALES - GENERAL: PAINLEVEL: NO PAIN (0)

## 2021-02-11 NOTE — NURSING NOTE
Chief Complaint   Patient presents with     Prenatal Care     36w0d     Offers no complaints  Melva Colindres LPN........................2/11/2021  8:41 AM     Medication Reconciliation: completed   Melva Colindres LPN  2/11/2021 8:41 AM

## 2021-02-11 NOTE — PROGRESS NOTES
CC: Recheck OB visit at 36w0d    HPI: Paula Argueta presents for a routine OB visit now at 36w0d  She has no concerns. Denies cramping, bleeding, normal fetal movement. Denies excessive thirst or urination.    OB History    Para Term  AB Living   3 1 1 0 1 1   SAB TAB Ectopic Multiple Live Births   0 0 0 0 1      # Outcome Date GA Lbr Femi/2nd Weight Sex Delivery Anes PTL Lv   3 Current            2 AB 19 18w1d   M          Birth Comments: Fetal Hydrops   1 Term 18   3.201 kg (7 lb 0.9 oz) M CS-LVertical Gen  LES      Complications: Fetal Intolerance, Failure to Progress in First Stage      Name: ERIC ARGUETA      Apgar1: 7  Apgar5: 8     Current Outpatient Medications   Medication     albuterol (PROVENTIL HFA) 108 (90 Base) MCG/ACT inhaler     butalbital-acetaminophen-caffeine (ESGIC) -40 MG tablet     EPINEPHrine (ANY BX GENERIC EQUIV) 0.3 MG/0.3ML injection 2-pack     famotidine (PEPCID) 20 MG tablet     hydrOXYzine (ATARAX) 25 MG tablet     levothyroxine (TIROSINT) 125 MCG capsule     Prenatal Vit-Fe Fumarate-FA (PRENATAL PO)     No current facility-administered medications for this visit.          O: /72 (BP Location: Right arm, Patient Position: Sitting, Cuff Size: Adult Large)   Pulse 88   Wt 73 kg (161 lb)   LMP 2020   Breastfeeding No   BMI 27.64 kg/m    Body mass index is 27.64 kg/m .  See OB flow sheet  EXAM:  NAD  FH: 37 cm  FHT:  bpm  Cx LTC, GBS swab collected    No results found for any visits on 21.    A/P: (P07.39)  , gestational age 36 completed weeks  (primary encounter diagnosis)  Comment:   Plan: Rectal/Vag Group B Strep Culture          Recheck in 1 week    Problem List:   Pregnancy risk factors include:  Hx of preeclampsia-baby aspirin  gestational DI last pregnancy- monitory for symptoms of polydipsia and polyuria- monitor sodium as indicated  Hx of hydrops and demise with last pregnancy- MFM US and consult  Bleeding in  pregnancy      Seth Morfin MD FACOG  8:52 AM 2/11/2021

## 2021-02-13 LAB
BACTERIA SPEC CULT: NORMAL
SPECIMEN SOURCE: NORMAL

## 2021-02-18 ENCOUNTER — ANESTHESIA (OUTPATIENT)
Dept: SURGERY | Facility: OTHER | Age: 26
End: 2021-02-18
Payer: COMMERCIAL

## 2021-02-18 ENCOUNTER — ANESTHESIA EVENT (OUTPATIENT)
Dept: SURGERY | Facility: OTHER | Age: 26
End: 2021-02-18
Payer: COMMERCIAL

## 2021-02-18 ENCOUNTER — HOSPITAL ENCOUNTER (OUTPATIENT)
Facility: OTHER | Age: 26
Discharge: HOME | End: 2021-02-18
Attending: OBSTETRICS & GYNECOLOGY | Admitting: OBSTETRICS & GYNECOLOGY
Payer: COMMERCIAL

## 2021-02-18 ENCOUNTER — PRENATAL OFFICE VISIT (OUTPATIENT)
Dept: OBGYN | Facility: OTHER | Age: 26
End: 2021-02-18
Attending: OBSTETRICS & GYNECOLOGY
Payer: COMMERCIAL

## 2021-02-18 VITALS
SYSTOLIC BLOOD PRESSURE: 130 MMHG | HEART RATE: 89 BPM | WEIGHT: 160.5 LBS | DIASTOLIC BLOOD PRESSURE: 74 MMHG | BODY MASS INDEX: 27.55 KG/M2 | OXYGEN SATURATION: 100 %

## 2021-02-18 VITALS — SYSTOLIC BLOOD PRESSURE: 117 MMHG | HEART RATE: 109 BPM | DIASTOLIC BLOOD PRESSURE: 72 MMHG | TEMPERATURE: 98.3 F

## 2021-02-18 DIAGNOSIS — O26.893 PREGNANCY HEADACHE IN THIRD TRIMESTER: ICD-10-CM

## 2021-02-18 DIAGNOSIS — R51.9 PREGNANCY HEADACHE IN THIRD TRIMESTER: ICD-10-CM

## 2021-02-18 DIAGNOSIS — O13.3 GESTATIONAL HYPERTENSION, THIRD TRIMESTER: Primary | ICD-10-CM

## 2021-02-18 DIAGNOSIS — Z98.891 HISTORY OF C-SECTION: ICD-10-CM

## 2021-02-18 LAB
ABO + RH BLD: NORMAL
ABO + RH BLD: NORMAL
ALBUMIN MFR UR ELPH: 7 MG/DL (ref 1–14)
ALBUMIN SERPL-MCNC: 3.6 G/DL (ref 3.5–5.7)
ALP SERPL-CCNC: 129 U/L (ref 34–104)
ALT SERPL W P-5'-P-CCNC: 8 U/L (ref 7–52)
ANION GAP SERPL CALCULATED.3IONS-SCNC: 10 MMOL/L (ref 3–14)
AST SERPL W P-5'-P-CCNC: 13 U/L (ref 13–39)
BILIRUB SERPL-MCNC: 0.9 MG/DL (ref 0.3–1)
BLD GP AB SCN SERPL QL: NORMAL
BLOOD BANK CMNT PATIENT-IMP: NORMAL
BUN SERPL-MCNC: 7 MG/DL (ref 7–25)
CALCIUM SERPL-MCNC: 9.1 MG/DL (ref 8.6–10.3)
CHLORIDE SERPL-SCNC: 104 MMOL/L (ref 98–107)
CO2 SERPL-SCNC: 23 MMOL/L (ref 21–31)
CREAT SERPL-MCNC: 0.65 MG/DL (ref 0.6–1.2)
CREAT UR-MCNC: 38 MG/DL
ERYTHROCYTE [DISTWIDTH] IN BLOOD BY AUTOMATED COUNT: 13.2 % (ref 10–15)
GFR SERPL CREATININE-BSD FRML MDRD: >90 ML/MIN/{1.73_M2}
GLUCOSE SERPL-MCNC: 72 MG/DL (ref 70–105)
HCT VFR BLD AUTO: 34.8 % (ref 35–47)
HGB BLD-MCNC: 10.5 G/DL (ref 11.7–15.7)
HGB BLD-MCNC: 10.8 G/DL (ref 11.7–15.7)
LABORATORY COMMENT REPORT: NORMAL
MCH RBC QN AUTO: 25.5 PG (ref 26.5–33)
MCHC RBC AUTO-ENTMCNC: 31 G/DL (ref 31.5–36.5)
MCV RBC AUTO: 82 FL (ref 78–100)
PLATELET # BLD AUTO: 197 10E9/L (ref 150–450)
POTASSIUM SERPL-SCNC: 3.7 MMOL/L (ref 3.5–5.1)
PROT SERPL-MCNC: 6.9 G/DL (ref 6.4–8.9)
PROT/CREAT 24H UR: 0.18 MG/G{CREAT}
RBC # BLD AUTO: 4.23 10E12/L (ref 3.8–5.2)
SARS-COV-2 RNA RESP QL NAA+PROBE: NEGATIVE
SODIUM SERPL-SCNC: 137 MMOL/L (ref 134–144)
SPECIMEN EXP DATE BLD: NORMAL
SPECIMEN SOURCE: NORMAL
WBC # BLD AUTO: 6.5 10E9/L (ref 4–11)

## 2021-02-18 PROCEDURE — 86780 TREPONEMA PALLIDUM: CPT | Performed by: OBSTETRICS & GYNECOLOGY

## 2021-02-18 PROCEDURE — 85027 COMPLETE CBC AUTOMATED: CPT | Mod: ZL | Performed by: OBSTETRICS & GYNECOLOGY

## 2021-02-18 PROCEDURE — 80053 COMPREHEN METABOLIC PANEL: CPT | Mod: ZL | Performed by: OBSTETRICS & GYNECOLOGY

## 2021-02-18 PROCEDURE — 250N000013 HC RX MED GY IP 250 OP 250 PS 637: Performed by: OBSTETRICS & GYNECOLOGY

## 2021-02-18 PROCEDURE — 84156 ASSAY OF PROTEIN URINE: CPT | Mod: ZL | Performed by: OBSTETRICS & GYNECOLOGY

## 2021-02-18 PROCEDURE — U0003 INFECTIOUS AGENT DETECTION BY NUCLEIC ACID (DNA OR RNA); SEVERE ACUTE RESPIRATORY SYNDROME CORONAVIRUS 2 (SARS-COV-2) (CORONAVIRUS DISEASE [COVID-19]), AMPLIFIED PROBE TECHNIQUE, MAKING USE OF HIGH THROUGHPUT TECHNOLOGIES AS DESCRIBED BY CMS-2020-01-R: HCPCS | Mod: ZL | Performed by: OBSTETRICS & GYNECOLOGY

## 2021-02-18 PROCEDURE — 86850 RBC ANTIBODY SCREEN: CPT | Performed by: OBSTETRICS & GYNECOLOGY

## 2021-02-18 PROCEDURE — 99207 PR OB VISIT-NO CHARGE - GICH ONLY: CPT | Performed by: OBSTETRICS & GYNECOLOGY

## 2021-02-18 PROCEDURE — 36415 COLL VENOUS BLD VENIPUNCTURE: CPT | Mod: ZL | Performed by: OBSTETRICS & GYNECOLOGY

## 2021-02-18 PROCEDURE — C9803 HOPD COVID-19 SPEC COLLECT: HCPCS

## 2021-02-18 PROCEDURE — 85018 HEMOGLOBIN: CPT | Performed by: OBSTETRICS & GYNECOLOGY

## 2021-02-18 PROCEDURE — G0463 HOSPITAL OUTPT CLINIC VISIT: HCPCS

## 2021-02-18 PROCEDURE — 86901 BLOOD TYPING SEROLOGIC RH(D): CPT | Performed by: OBSTETRICS & GYNECOLOGY

## 2021-02-18 PROCEDURE — U0005 INFEC AGEN DETEC AMPLI PROBE: HCPCS | Mod: ZL | Performed by: OBSTETRICS & GYNECOLOGY

## 2021-02-18 PROCEDURE — 86900 BLOOD TYPING SEROLOGIC ABO: CPT | Performed by: OBSTETRICS & GYNECOLOGY

## 2021-02-18 RX ORDER — NALOXONE HYDROCHLORIDE 0.4 MG/ML
0.4 INJECTION, SOLUTION INTRAMUSCULAR; INTRAVENOUS; SUBCUTANEOUS
Status: DISCONTINUED | OUTPATIENT
Start: 2021-02-18 | End: 2021-02-18 | Stop reason: HOSPADM

## 2021-02-18 RX ORDER — SODIUM CHLORIDE, SODIUM LACTATE, POTASSIUM CHLORIDE, CALCIUM CHLORIDE 600; 310; 30; 20 MG/100ML; MG/100ML; MG/100ML; MG/100ML
INJECTION, SOLUTION INTRAVENOUS CONTINUOUS
Status: DISCONTINUED | OUTPATIENT
Start: 2021-02-18 | End: 2021-02-18 | Stop reason: HOSPADM

## 2021-02-18 RX ORDER — NALOXONE HYDROCHLORIDE 0.4 MG/ML
0.2 INJECTION, SOLUTION INTRAMUSCULAR; INTRAVENOUS; SUBCUTANEOUS
Status: DISCONTINUED | OUTPATIENT
Start: 2021-02-18 | End: 2021-02-18 | Stop reason: HOSPADM

## 2021-02-18 RX ORDER — OXYCODONE HYDROCHLORIDE 5 MG/1
5 TABLET ORAL EVERY 4 HOURS PRN
Status: DISCONTINUED | OUTPATIENT
Start: 2021-02-18 | End: 2021-02-18 | Stop reason: HOSPADM

## 2021-02-18 RX ORDER — NALOXONE HYDROCHLORIDE 0.4 MG/ML
0.4 INJECTION, SOLUTION INTRAMUSCULAR; INTRAVENOUS; SUBCUTANEOUS
Status: CANCELLED | OUTPATIENT
Start: 2021-02-18 | End: 2021-02-19

## 2021-02-18 RX ORDER — CEFAZOLIN SODIUM 2 G/100ML
2 INJECTION, SOLUTION INTRAVENOUS
Status: DISCONTINUED | OUTPATIENT
Start: 2021-02-18 | End: 2021-02-18 | Stop reason: HOSPADM

## 2021-02-18 RX ORDER — FENTANYL CITRATE 50 UG/ML
25-50 INJECTION, SOLUTION INTRAMUSCULAR; INTRAVENOUS
Status: CANCELLED | OUTPATIENT
Start: 2021-02-18

## 2021-02-18 RX ORDER — CEFAZOLIN SODIUM 1 G/50ML
1 INJECTION, SOLUTION INTRAVENOUS SEE ADMIN INSTRUCTIONS
Status: CANCELLED | OUTPATIENT
Start: 2021-02-18

## 2021-02-18 RX ORDER — LIDOCAINE 40 MG/G
CREAM TOPICAL
Status: CANCELLED | OUTPATIENT
Start: 2021-02-18

## 2021-02-18 RX ORDER — SODIUM CHLORIDE, SODIUM LACTATE, POTASSIUM CHLORIDE, CALCIUM CHLORIDE 600; 310; 30; 20 MG/100ML; MG/100ML; MG/100ML; MG/100ML
INJECTION, SOLUTION INTRAVENOUS CONTINUOUS
Status: CANCELLED | OUTPATIENT
Start: 2021-02-18

## 2021-02-18 RX ORDER — CEFAZOLIN SODIUM 1 G/50ML
1 INJECTION, SOLUTION INTRAVENOUS SEE ADMIN INSTRUCTIONS
Status: DISCONTINUED | OUTPATIENT
Start: 2021-02-18 | End: 2021-02-18 | Stop reason: HOSPADM

## 2021-02-18 RX ORDER — CITRIC ACID/SODIUM CITRATE 334-500MG
30 SOLUTION, ORAL ORAL
Status: CANCELLED | OUTPATIENT
Start: 2021-02-18

## 2021-02-18 RX ORDER — ONDANSETRON 4 MG/1
4 TABLET, ORALLY DISINTEGRATING ORAL EVERY 30 MIN PRN
Status: CANCELLED | OUTPATIENT
Start: 2021-02-18

## 2021-02-18 RX ORDER — CEFAZOLIN SODIUM 2 G/100ML
2 INJECTION, SOLUTION INTRAVENOUS
Status: CANCELLED | OUTPATIENT
Start: 2021-02-18

## 2021-02-18 RX ORDER — ACETAMINOPHEN 325 MG/1
650 TABLET ORAL EVERY 4 HOURS PRN
Status: DISCONTINUED | OUTPATIENT
Start: 2021-02-18 | End: 2021-02-18 | Stop reason: HOSPADM

## 2021-02-18 RX ORDER — LIDOCAINE 40 MG/G
CREAM TOPICAL
Status: DISCONTINUED | OUTPATIENT
Start: 2021-02-18 | End: 2021-02-18 | Stop reason: HOSPADM

## 2021-02-18 RX ORDER — CITRIC ACID/SODIUM CITRATE 334-500MG
30 SOLUTION, ORAL ORAL
Status: DISCONTINUED | OUTPATIENT
Start: 2021-02-18 | End: 2021-02-18 | Stop reason: HOSPADM

## 2021-02-18 RX ORDER — ONDANSETRON 4 MG/1
4 TABLET, ORALLY DISINTEGRATING ORAL EVERY 6 HOURS PRN
Status: DISCONTINUED | OUTPATIENT
Start: 2021-02-18 | End: 2021-02-18 | Stop reason: HOSPADM

## 2021-02-18 RX ORDER — NALOXONE HYDROCHLORIDE 0.4 MG/ML
0.2 INJECTION, SOLUTION INTRAMUSCULAR; INTRAVENOUS; SUBCUTANEOUS
Status: CANCELLED | OUTPATIENT
Start: 2021-02-18 | End: 2021-02-19

## 2021-02-18 RX ORDER — ONDANSETRON 2 MG/ML
4 INJECTION INTRAMUSCULAR; INTRAVENOUS EVERY 30 MIN PRN
Status: CANCELLED | OUTPATIENT
Start: 2021-02-18

## 2021-02-18 RX ADMIN — ACETAMINOPHEN 650 MG: 325 TABLET ORAL at 12:50

## 2021-02-18 ASSESSMENT — PAIN SCALES - GENERAL: PAINLEVEL: SEVERE PAIN (6)

## 2021-02-18 NOTE — PROGRESS NOTES
CC: Recheck OB visit at 37w0d    HPI: Paula Argueta presents for a routine OB visit now at 37w0d  Denies cramping, bleeding, normal fetal movement. Headache and visual disturbance which have not responded to Tylenol. History of severe preeclampsia with first baby. History of hypothyroidism and gestational diabetes insipidus, currently stable off meds.    OB History    Para Term  AB Living   3 1 1 0 1 1   SAB TAB Ectopic Multiple Live Births   0 0 0 0 1      # Outcome Date GA Lbr Femi/2nd Weight Sex Delivery Anes PTL Lv   3 Current            2 AB 19 18w1d   M          Birth Comments: Fetal Hydrops   1 Term 18   3.201 kg (7 lb 0.9 oz) M CS-LVertical Gen  LES      Complications: Fetal Intolerance, Failure to Progress in First Stage      Name: ERIC ARGUETA      Apgar1: 7  Apgar5: 8     Current Outpatient Medications   Medication     albuterol (PROVENTIL HFA) 108 (90 Base) MCG/ACT inhaler     butalbital-acetaminophen-caffeine (ESGIC) -40 MG tablet     EPINEPHrine (ANY BX GENERIC EQUIV) 0.3 MG/0.3ML injection 2-pack     famotidine (PEPCID) 20 MG tablet     hydrOXYzine (ATARAX) 25 MG tablet     levothyroxine (TIROSINT) 125 MCG capsule     Prenatal Vit-Fe Fumarate-FA (PRENATAL PO)     No current facility-administered medications for this visit.        O: /74 (BP Location: Right arm, Patient Position: Sitting, Cuff Size: Adult Regular)   Pulse 89   Wt 72.8 kg (160 lb 8 oz)   LMP 2020   SpO2 100%   BMI 27.55 kg/m    Body mass index is 27.55 kg/m .  See OB flow sheet  EXAM:  NAD  FH: 37 cm  FHT: 140 bpm  Ext: 2+ ankle edema  DTR's hyperreflexic without clonus in LE bilaterally  CN 2-12 intact grossly    No results found for any visits on 21.    A/P: 37w0d    (O13.3) Gestational hypertension, third trimester  (primary encounter diagnosis)  Comment:   Plan: Protein Random Urine, Creatinine urine         calculation only, CANCELED: Protein Random         Urine             (O26.893,  R51.9) Pregnancy headache in third trimester  Comment:   Plan: CBC W PLT No Diff, Comprehensive Metabolic         Panel          Results for orders placed or performed in visit on 02/18/21   Protein Random Urine     Status: None   Result Value Ref Range    Total Protein Random Urine 7 1 - 14 mg/dL    Protein Total UR MG/G CR 0.18 mg/g[creat]   Creatinine urine calculation only     Status: None   Result Value Ref Range    Creatinine Urine 38 mg/dL   CBC W PLT No Diff     Status: Abnormal   Result Value Ref Range    WBC 6.5 4.0 - 11.0 10e9/L    RBC Count 4.23 3.8 - 5.2 10e12/L    Hemoglobin 10.8 (L) 11.7 - 15.7 g/dL    Hematocrit 34.8 (L) 35.0 - 47.0 %    MCV 82 78 - 100 fl    MCH 25.5 (L) 26.5 - 33.0 pg    MCHC 31.0 (L) 31.5 - 36.5 g/dL    RDW 13.2 10.0 - 15.0 %    Platelet Count 197 150 - 450 10e9/L   Comprehensive Metabolic Panel     Status: Abnormal   Result Value Ref Range    Sodium 137 134 - 144 mmol/L    Potassium 3.7 3.5 - 5.1 mmol/L    Chloride 104 98 - 107 mmol/L    Carbon Dioxide 23 21 - 31 mmol/L    Anion Gap 10 3 - 14 mmol/L    Glucose 72 70 - 105 mg/dL    Urea Nitrogen 7 7 - 25 mg/dL    Creatinine 0.65 0.60 - 1.20 mg/dL    GFR Estimate >90 >60 mL/min/[1.73_m2]    GFR Estimate If Black >90 >60 mL/min/[1.73_m2]    Calcium 9.1 8.6 - 10.3 mg/dL    Bilirubin Total 0.9 0.3 - 1.0 mg/dL    Albumin 3.6 3.5 - 5.7 g/dL    Protein Total 6.9 6.4 - 8.9 g/dL    Alkaline Phosphatase 129 (H) 34 - 104 U/L    ALT 8 7 - 52 U/L    AST 13 13 - 39 U/L   SARS-CoV-2 COVID-19 Virus (Coronavirus) by PCR     Status: None    Specimen: Nasopharyngeal   Result Value Ref Range    SARS-CoV-2 Virus Specimen Source Nasopharyngeal     SARS-CoV-2 PCR Result NEGATIVE     SARS-CoV-2 PCR Comment       Testing was performed using the Xpert Xpress SARS-CoV-2 Assay on the Cepheid Gene-Xpert   Instrument Systems. Additional information about this Emergency Use Authorization (EUA)   assay can be found via the Lab Guide.            Plan for delivery with symptomatic gestational hypertension worrisome for evolving preeclampsia      Problem List:   Pregnancy risk factors include:  Hx of preeclampsia-baby aspirin  gestational DI last pregnancy- monitory for symptoms of polydipsia and polyuria- monitor sodium as indicated  Hx of hydrops and demise with last pregnancy- MFM US and consult  Bleeding in pregnancy    Seth Morfin MD FACOG  8:26 AM 2/18/2021

## 2021-02-18 NOTE — PROGRESS NOTES
Patient wanting to go home, denies blurred vision and floaters, headache is subsiding, blood pressures WNL, last /72.  baseline, moderate variability, accelerations to 150, category one tracing. Having mild irregular contractions and patient states she has been having these quality contractions and home and she is not concerned about contractions. Please see flow sheet for additional information. Patient will return to [facility] at 0530 2021 for scheduled repeat  section per STEPHIE Morfin MD. Phone call to Navjot MORGAN and patient cleared for discharge home, status report given. Patient discharged home ambulatory. Discharge instruction completed both written and verbal and questions answered.

## 2021-02-18 NOTE — DISCHARGE INSTRUCTIONS
Return to [facility] for scheduled  section 2021 at 0530 AM.    Return to [facility] for following headache, blurred vision, floaters in eyesight, increased edema.

## 2021-02-19 ENCOUNTER — HOSPITAL ENCOUNTER (INPATIENT)
Facility: OTHER | Age: 26
LOS: 3 days | Discharge: HOME | End: 2021-02-22
Attending: OBSTETRICS & GYNECOLOGY | Admitting: OBSTETRICS & GYNECOLOGY
Payer: COMMERCIAL

## 2021-02-19 DIAGNOSIS — Z98.891 HISTORY OF C-SECTION: ICD-10-CM

## 2021-02-19 LAB
ABO + RH BLD: NORMAL
ABO + RH BLD: NORMAL
BLD GP AB SCN SERPL QL: NORMAL
BLOOD BANK CMNT PATIENT-IMP: NORMAL
SPECIMEN EXP DATE BLD: NORMAL
T PALLIDUM AB SER QL: NONREACTIVE

## 2021-02-19 PROCEDURE — 250N000011 HC RX IP 250 OP 636: Performed by: OBSTETRICS & GYNECOLOGY

## 2021-02-19 PROCEDURE — 360N000076 HC SURGERY LEVEL 3, PER MIN: Performed by: OBSTETRICS & GYNECOLOGY

## 2021-02-19 PROCEDURE — 120N000001 HC R&B MED SURG/OB

## 2021-02-19 PROCEDURE — 258N000003 HC RX IP 258 OP 636: Performed by: OBSTETRICS & GYNECOLOGY

## 2021-02-19 PROCEDURE — 250N000009 HC RX 250: Performed by: NURSE ANESTHETIST, CERTIFIED REGISTERED

## 2021-02-19 PROCEDURE — 258N000003 HC RX IP 258 OP 636: Performed by: NURSE ANESTHETIST, CERTIFIED REGISTERED

## 2021-02-19 PROCEDURE — 86901 BLOOD TYPING SEROLOGIC RH(D): CPT | Performed by: OBSTETRICS & GYNECOLOGY

## 2021-02-19 PROCEDURE — 86900 BLOOD TYPING SEROLOGIC ABO: CPT | Performed by: OBSTETRICS & GYNECOLOGY

## 2021-02-19 PROCEDURE — 999N000157 HC STATISTIC RCP TIME EA 10 MIN

## 2021-02-19 PROCEDURE — 36415 COLL VENOUS BLD VENIPUNCTURE: CPT | Performed by: OBSTETRICS & GYNECOLOGY

## 2021-02-19 PROCEDURE — 710N000010 HC RECOVERY PHASE 1, LEVEL 2, PER MIN: Performed by: OBSTETRICS & GYNECOLOGY

## 2021-02-19 PROCEDURE — 59510 CESAREAN DELIVERY: CPT | Performed by: NURSE ANESTHETIST, CERTIFIED REGISTERED

## 2021-02-19 PROCEDURE — 250N000011 HC RX IP 250 OP 636: Performed by: NURSE ANESTHETIST, CERTIFIED REGISTERED

## 2021-02-19 PROCEDURE — 272N000001 HC OR GENERAL SUPPLY STERILE: Performed by: OBSTETRICS & GYNECOLOGY

## 2021-02-19 PROCEDURE — 59510 CESAREAN DELIVERY: CPT | Performed by: OBSTETRICS & GYNECOLOGY

## 2021-02-19 PROCEDURE — 250N000009 HC RX 250: Performed by: OBSTETRICS & GYNECOLOGY

## 2021-02-19 PROCEDURE — 370N000017 HC ANESTHESIA TECHNICAL FEE, PER MIN: Performed by: OBSTETRICS & GYNECOLOGY

## 2021-02-19 PROCEDURE — 250N000013 HC RX MED GY IP 250 OP 250 PS 637: Performed by: OBSTETRICS & GYNECOLOGY

## 2021-02-19 PROCEDURE — 250N000025 HC SEVOFLURANE, PER MIN: Performed by: OBSTETRICS & GYNECOLOGY

## 2021-02-19 PROCEDURE — 86850 RBC ANTIBODY SCREEN: CPT | Performed by: OBSTETRICS & GYNECOLOGY

## 2021-02-19 RX ORDER — NALOXONE HYDROCHLORIDE 0.4 MG/ML
0.2 INJECTION, SOLUTION INTRAMUSCULAR; INTRAVENOUS; SUBCUTANEOUS
Status: ACTIVE | OUTPATIENT
Start: 2021-02-19 | End: 2021-02-20

## 2021-02-19 RX ORDER — AMOXICILLIN 250 MG
1 CAPSULE ORAL 2 TIMES DAILY
Status: DISCONTINUED | OUTPATIENT
Start: 2021-02-19 | End: 2021-02-22 | Stop reason: HOSPADM

## 2021-02-19 RX ORDER — TRANEXAMIC ACID 10 MG/ML
1 INJECTION, SOLUTION INTRAVENOUS EVERY 30 MIN PRN
Status: DISCONTINUED | OUTPATIENT
Start: 2021-02-19 | End: 2021-02-22 | Stop reason: HOSPADM

## 2021-02-19 RX ORDER — ACETAMINOPHEN 325 MG/1
975 TABLET ORAL EVERY 6 HOURS
Status: DISCONTINUED | OUTPATIENT
Start: 2021-02-19 | End: 2021-02-22 | Stop reason: HOSPADM

## 2021-02-19 RX ORDER — OXYTOCIN/0.9 % SODIUM CHLORIDE 30/500 ML
PLASTIC BAG, INJECTION (ML) INTRAVENOUS PRN
Status: DISCONTINUED | OUTPATIENT
Start: 2021-02-19 | End: 2021-02-19

## 2021-02-19 RX ORDER — DEXTROSE, SODIUM CHLORIDE, SODIUM LACTATE, POTASSIUM CHLORIDE, AND CALCIUM CHLORIDE 5; .6; .31; .03; .02 G/100ML; G/100ML; G/100ML; G/100ML; G/100ML
INJECTION, SOLUTION INTRAVENOUS CONTINUOUS
Status: DISCONTINUED | OUTPATIENT
Start: 2021-02-19 | End: 2021-02-22 | Stop reason: HOSPADM

## 2021-02-19 RX ORDER — IBUPROFEN 400 MG/1
800 TABLET, FILM COATED ORAL EVERY 6 HOURS
Status: DISCONTINUED | OUTPATIENT
Start: 2021-02-20 | End: 2021-02-22 | Stop reason: HOSPADM

## 2021-02-19 RX ORDER — BISACODYL 10 MG
10 SUPPOSITORY, RECTAL RECTAL DAILY PRN
Status: DISCONTINUED | OUTPATIENT
Start: 2021-02-21 | End: 2021-02-22 | Stop reason: HOSPADM

## 2021-02-19 RX ORDER — OXYTOCIN 10 [USP'U]/ML
10 INJECTION, SOLUTION INTRAMUSCULAR; INTRAVENOUS
Status: DISCONTINUED | OUTPATIENT
Start: 2021-02-19 | End: 2021-02-22 | Stop reason: HOSPADM

## 2021-02-19 RX ORDER — HYDROCORTISONE 2.5 %
CREAM (GRAM) TOPICAL 3 TIMES DAILY PRN
Status: DISCONTINUED | OUTPATIENT
Start: 2021-02-19 | End: 2021-02-22 | Stop reason: HOSPADM

## 2021-02-19 RX ORDER — NALOXONE HYDROCHLORIDE 0.4 MG/ML
0.4 INJECTION, SOLUTION INTRAMUSCULAR; INTRAVENOUS; SUBCUTANEOUS
Status: ACTIVE | OUTPATIENT
Start: 2021-02-19 | End: 2021-02-20

## 2021-02-19 RX ORDER — OXYTOCIN/0.9 % SODIUM CHLORIDE 30/500 ML
100 PLASTIC BAG, INJECTION (ML) INTRAVENOUS CONTINUOUS
Status: DISCONTINUED | OUTPATIENT
Start: 2021-02-19 | End: 2021-02-22 | Stop reason: HOSPADM

## 2021-02-19 RX ORDER — FENTANYL CITRATE 50 UG/ML
INJECTION, SOLUTION INTRAMUSCULAR; INTRAVENOUS PRN
Status: DISCONTINUED | OUTPATIENT
Start: 2021-02-19 | End: 2021-02-19

## 2021-02-19 RX ORDER — ASPIRIN 81 MG/1
81 TABLET, CHEWABLE ORAL DAILY
Status: ON HOLD | COMMUNITY
End: 2021-02-22

## 2021-02-19 RX ORDER — SIMETHICONE 80 MG
80 TABLET,CHEWABLE ORAL 4 TIMES DAILY PRN
Status: DISCONTINUED | OUTPATIENT
Start: 2021-02-19 | End: 2021-02-22 | Stop reason: HOSPADM

## 2021-02-19 RX ORDER — ONDANSETRON 2 MG/ML
INJECTION INTRAMUSCULAR; INTRAVENOUS PRN
Status: DISCONTINUED | OUTPATIENT
Start: 2021-02-19 | End: 2021-02-19

## 2021-02-19 RX ORDER — LIDOCAINE 40 MG/G
CREAM TOPICAL
Status: DISCONTINUED | OUTPATIENT
Start: 2021-02-19 | End: 2021-02-22 | Stop reason: HOSPADM

## 2021-02-19 RX ORDER — DIPHENHYDRAMINE HCL 25 MG
25-50 CAPSULE ORAL EVERY 6 HOURS PRN
Status: DISCONTINUED | OUTPATIENT
Start: 2021-02-19 | End: 2021-02-22 | Stop reason: HOSPADM

## 2021-02-19 RX ORDER — MAGNESIUM HYDROXIDE 1200 MG/15ML
LIQUID ORAL PRN
Status: DISCONTINUED | OUTPATIENT
Start: 2021-02-19 | End: 2021-02-19 | Stop reason: HOSPADM

## 2021-02-19 RX ORDER — BUPIVACAINE HYDROCHLORIDE AND EPINEPHRINE 2.5; 5 MG/ML; UG/ML
INJECTION, SOLUTION EPIDURAL; INFILTRATION; INTRACAUDAL; PERINEURAL PRN
Status: DISCONTINUED | OUTPATIENT
Start: 2021-02-19 | End: 2021-02-19 | Stop reason: HOSPADM

## 2021-02-19 RX ORDER — ONDANSETRON 4 MG/1
4 TABLET, ORALLY DISINTEGRATING ORAL EVERY 30 MIN PRN
Status: DISCONTINUED | OUTPATIENT
Start: 2021-02-19 | End: 2021-02-19 | Stop reason: HOSPADM

## 2021-02-19 RX ORDER — LIDOCAINE 40 MG/G
CREAM TOPICAL
Status: DISCONTINUED | OUTPATIENT
Start: 2021-02-19 | End: 2021-02-19 | Stop reason: HOSPADM

## 2021-02-19 RX ORDER — ACETAMINOPHEN 10 MG/ML
INJECTION, SOLUTION INTRAVENOUS PRN
Status: DISCONTINUED | OUTPATIENT
Start: 2021-02-19 | End: 2021-02-19

## 2021-02-19 RX ORDER — LIDOCAINE HYDROCHLORIDE 10 MG/ML
INJECTION, SOLUTION INFILTRATION; PERINEURAL PRN
Status: DISCONTINUED | OUTPATIENT
Start: 2021-02-19 | End: 2021-02-19

## 2021-02-19 RX ORDER — OXYTOCIN/0.9 % SODIUM CHLORIDE 30/500 ML
PLASTIC BAG, INJECTION (ML) INTRAVENOUS CONTINUOUS PRN
Status: DISCONTINUED | OUTPATIENT
Start: 2021-02-19 | End: 2021-02-19

## 2021-02-19 RX ORDER — SODIUM CHLORIDE, SODIUM LACTATE, POTASSIUM CHLORIDE, CALCIUM CHLORIDE 600; 310; 30; 20 MG/100ML; MG/100ML; MG/100ML; MG/100ML
INJECTION, SOLUTION INTRAVENOUS CONTINUOUS
Status: DISCONTINUED | OUTPATIENT
Start: 2021-02-19 | End: 2021-02-19 | Stop reason: HOSPADM

## 2021-02-19 RX ORDER — MODIFIED LANOLIN
OINTMENT (GRAM) TOPICAL
Status: DISCONTINUED | OUTPATIENT
Start: 2021-02-19 | End: 2021-02-22 | Stop reason: HOSPADM

## 2021-02-19 RX ORDER — AMOXICILLIN 250 MG
2 CAPSULE ORAL 2 TIMES DAILY
Status: DISCONTINUED | OUTPATIENT
Start: 2021-02-19 | End: 2021-02-22 | Stop reason: HOSPADM

## 2021-02-19 RX ORDER — PRENATAL VIT/IRON FUM/FOLIC AC 27MG-0.8MG
1 TABLET ORAL DAILY
Status: DISCONTINUED | OUTPATIENT
Start: 2021-02-19 | End: 2021-02-22 | Stop reason: HOSPADM

## 2021-02-19 RX ORDER — CEFAZOLIN SODIUM 2 G/100ML
2 INJECTION, SOLUTION INTRAVENOUS
Status: DISCONTINUED | OUTPATIENT
Start: 2021-02-19 | End: 2021-02-19 | Stop reason: HOSPADM

## 2021-02-19 RX ORDER — NALBUPHINE HYDROCHLORIDE 10 MG/ML
2.5 INJECTION, SOLUTION INTRAMUSCULAR; INTRAVENOUS; SUBCUTANEOUS
Status: DISCONTINUED | OUTPATIENT
Start: 2021-02-19 | End: 2021-02-22 | Stop reason: HOSPADM

## 2021-02-19 RX ORDER — ACETAMINOPHEN 325 MG/1
650 TABLET ORAL ONCE
Status: COMPLETED | OUTPATIENT
Start: 2021-02-19 | End: 2021-02-19

## 2021-02-19 RX ORDER — KETOROLAC TROMETHAMINE 30 MG/ML
30 INJECTION, SOLUTION INTRAMUSCULAR; INTRAVENOUS EVERY 6 HOURS
Status: COMPLETED | OUTPATIENT
Start: 2021-02-19 | End: 2021-02-20

## 2021-02-19 RX ORDER — OXYCODONE HYDROCHLORIDE 5 MG/1
5 TABLET ORAL EVERY 4 HOURS PRN
Status: DISCONTINUED | OUTPATIENT
Start: 2021-02-19 | End: 2021-02-22 | Stop reason: HOSPADM

## 2021-02-19 RX ORDER — BUPIVACAINE HYDROCHLORIDE 7.5 MG/ML
INJECTION, SOLUTION INTRASPINAL PRN
Status: DISCONTINUED | OUTPATIENT
Start: 2021-02-19 | End: 2021-02-19

## 2021-02-19 RX ORDER — ONDANSETRON 2 MG/ML
4 INJECTION INTRAMUSCULAR; INTRAVENOUS EVERY 30 MIN PRN
Status: DISCONTINUED | OUTPATIENT
Start: 2021-02-19 | End: 2021-02-19 | Stop reason: HOSPADM

## 2021-02-19 RX ORDER — FENTANYL CITRATE 50 UG/ML
25-50 INJECTION, SOLUTION INTRAMUSCULAR; INTRAVENOUS
Status: DISCONTINUED | OUTPATIENT
Start: 2021-02-19 | End: 2021-02-19 | Stop reason: HOSPADM

## 2021-02-19 RX ORDER — KETOROLAC TROMETHAMINE 30 MG/ML
INJECTION, SOLUTION INTRAMUSCULAR; INTRAVENOUS PRN
Status: DISCONTINUED | OUTPATIENT
Start: 2021-02-19 | End: 2021-02-19

## 2021-02-19 RX ORDER — CITRIC ACID/SODIUM CITRATE 334-500MG
30 SOLUTION, ORAL ORAL
Status: DISCONTINUED | OUTPATIENT
Start: 2021-02-19 | End: 2021-02-19 | Stop reason: HOSPADM

## 2021-02-19 RX ORDER — CEFAZOLIN SODIUM 1 G/50ML
1 INJECTION, SOLUTION INTRAVENOUS SEE ADMIN INSTRUCTIONS
Status: DISCONTINUED | OUTPATIENT
Start: 2021-02-19 | End: 2021-02-19 | Stop reason: HOSPADM

## 2021-02-19 RX ORDER — ONDANSETRON 2 MG/ML
4 INJECTION INTRAMUSCULAR; INTRAVENOUS EVERY 6 HOURS PRN
Status: DISCONTINUED | OUTPATIENT
Start: 2021-02-19 | End: 2021-02-22 | Stop reason: HOSPADM

## 2021-02-19 RX ORDER — OXYTOCIN/0.9 % SODIUM CHLORIDE 30/500 ML
340 PLASTIC BAG, INJECTION (ML) INTRAVENOUS CONTINUOUS PRN
Status: DISCONTINUED | OUTPATIENT
Start: 2021-02-19 | End: 2021-02-22 | Stop reason: HOSPADM

## 2021-02-19 RX ADMIN — SODIUM CHLORIDE, POTASSIUM CHLORIDE, SODIUM LACTATE AND CALCIUM CHLORIDE: 600; 310; 30; 20 INJECTION, SOLUTION INTRAVENOUS at 06:31

## 2021-02-19 RX ADMIN — DIPHENHYDRAMINE HYDROCHLORIDE 25 MG: 25 CAPSULE ORAL at 10:36

## 2021-02-19 RX ADMIN — SIMETHICONE 80 MG: 80 TABLET, CHEWABLE ORAL at 22:27

## 2021-02-19 RX ADMIN — Medication 340 ML/HR: at 08:10

## 2021-02-19 RX ADMIN — SENNOSIDES AND DOCUSATE SODIUM 1 TABLET: 8.6; 5 TABLET ORAL at 10:36

## 2021-02-19 RX ADMIN — KETOROLAC TROMETHAMINE 30 MG: 30 INJECTION, SOLUTION INTRAMUSCULAR at 14:36

## 2021-02-19 RX ADMIN — ACETAMINOPHEN 1000 MG: 10 INJECTION, SOLUTION INTRAVENOUS at 08:23

## 2021-02-19 RX ADMIN — KETOROLAC TROMETHAMINE 30 MG: 30 INJECTION, SOLUTION INTRAMUSCULAR at 08:30

## 2021-02-19 RX ADMIN — PHENYLEPHRINE HYDROCHLORIDE 0.01 MCG/KG/MIN: 10 INJECTION INTRAVENOUS at 07:50

## 2021-02-19 RX ADMIN — ACETAMINOPHEN 975 MG: 325 TABLET ORAL at 22:27

## 2021-02-19 RX ADMIN — PRENATAL VIT W/ FE FUMARATE-FA TAB 27-0.8 MG 1 TABLET: 27-0.8 TAB at 10:36

## 2021-02-19 RX ADMIN — CEFAZOLIN SODIUM 2 G: 2 INJECTION, SOLUTION INTRAVENOUS at 07:59

## 2021-02-19 RX ADMIN — KETOROLAC TROMETHAMINE 30 MG: 30 INJECTION, SOLUTION INTRAMUSCULAR at 19:32

## 2021-02-19 RX ADMIN — ONDANSETRON 4 MG: 2 INJECTION INTRAMUSCULAR; INTRAVENOUS at 08:13

## 2021-02-19 RX ADMIN — BUPIVACAINE HYDROCHLORIDE IN DEXTROSE 1.8 ML: 7.5 INJECTION, SOLUTION SUBARACHNOID at 07:49

## 2021-02-19 RX ADMIN — SODIUM CHLORIDE, POTASSIUM CHLORIDE, SODIUM LACTATE AND CALCIUM CHLORIDE: 600; 310; 30; 20 INJECTION, SOLUTION INTRAVENOUS at 09:38

## 2021-02-19 RX ADMIN — SENNOSIDES AND DOCUSATE SODIUM 1 TABLET: 8.6; 5 TABLET ORAL at 19:33

## 2021-02-19 RX ADMIN — ACETAMINOPHEN 650 MG: 325 TABLET ORAL at 12:38

## 2021-02-19 RX ADMIN — FENTANYL CITRATE 15 MCG: 50 INJECTION, SOLUTION INTRAMUSCULAR; INTRAVENOUS at 07:49

## 2021-02-19 RX ADMIN — LIDOCAINE HYDROCHLORIDE 1.5 ML: 10 INJECTION, SOLUTION INFILTRATION; PERINEURAL at 07:47

## 2021-02-19 RX ADMIN — LEVOTHYROXINE SODIUM 62.5 MCG: 0.12 TABLET ORAL at 10:36

## 2021-02-19 RX ADMIN — ACETAMINOPHEN 975 MG: 325 TABLET ORAL at 16:45

## 2021-02-19 ASSESSMENT — ACTIVITIES OF DAILY LIVING (ADL)
FALL_HISTORY_WITHIN_LAST_SIX_MONTHS: YES
NUMBER_OF_TIMES_PATIENT_HAS_FALLEN_WITHIN_LAST_SIX_MONTHS: 1
TOILETING_ISSUES: NO

## 2021-02-19 NOTE — OR NURSING
PACU Transfer of Care Note    Paula Watson care was transferred to [facility] RNat 945.    PACU Respiratory Event Documentation     1) Episodes of Apnea greater than or equal to 10 seconds: none    2) Bradypnea - less than 8 breaths per minute: 16    3) Pain score on 0 to 10 scale: 0    4) Pain-sedation mismatch (yes or no): no    5) Repeated 02 desaturation less than 90% (yes or no): no    Anesthesia notified? (yes or no): no    Any of the above events occuring repeatedly in separate 30 minute intervals may be considered recurrent PACU respiratory events.    Patient stable and meets phase 1 discharge criteria  from PACU stage at 915.  Continued to care and monitor pt as phase II till 945 when care was taken over by RN.  Yeimi Malin RN on 2/19/2021 at 9:56 AM

## 2021-02-19 NOTE — L&D DELIVERY NOTE
OB  Delivery Note      Paula Watson MRN# [id number]   Age: 26 year old YOB: 1995       GA: 37w1d  GP:   Labor Complications:    EBL:   mL  Delivery QBL:    Delivery Type: , Low Transverse   ROM to Delivery Time: rupture date or rupture time have not been documented     1 Minute 5 Minute 10 Minute   Apgar Totals: 8   8        Personel Present: DONTA GARCIA PA student   Details    Pre-Op Diagnosis: 1. Intrauterine pregnancy at 37w1d  2. Gestational hypertension   Post-Op Diagnosis: 1. Same  Liveborn male infant   Indications:   Hypertension   Procedure:   , Low Transverse  via   incision   Anesthesia:  Spinal     Informed Consent:  The risks, benefits, complications, and alternatives were discussed with the patient. The patient understood that the risks of  section include, but are not limited to: injury to nearby structures or organs, infection, blood loss and possible need for transfusion, and potential need for more surgery including hysterectomy. The patient stated understanding and desired to proceed. All questions were answered. The site of surgery was properly noted and marked. The patient was identified as Paula Watson and the procedure verified as a  delivery. A Time Out was held and the above information confirmed.    Procedure Details:  DESCRIPTION OF PROCEDURE:     The patient was transferred to the OR where spinal anesthesia was accomplished.  A aguillon catheter was inserted and clear urine was noted.  The abdomen was scrubbed prepped and draped inthe usual manner.  A hard stop timeout was accomplished.  A transverse Pfannenstiel incision was made and sharp and electrocautery dissection carried down to the fascial layer.  The Fascia was opened in the midline andextended bluntly bilaterally.  The rectus muscles were  in the midline bluntly.  The peritoneum was bluntly divided and the Shanon ring  retractor was placed.  A bladder flap was made sharply.  The lower uterinesegment was incised sharply in a low transverse fashion and the amniotic sac ruptured bluntly with a finger.  Clear fluid was noted and the incision extended bluntly.  The fetal head was deliverd in the OT position.  The infant received nasal and oropharyngeal suction with the bulb suction. Nuchal cord x1 was noted and reduced. The shoulders delivered atraumatically followed by the remainder of the baby.   The cord was clamped after 30 seconds and cut and the infant handed to the  nurse.   evaluation was accomplished by Dr. Anthony at my request due to early term delivery and known risks of  respiratory support needs.  The placenta was manually removed and the uterus wiped clear of clots and debris. Pitocin was added to the IVinfusion and the uterus was noted to firm-up nicely. The tubes and ovaries appeared normal. The uterine incision was closed in a double layer of #1 Chromic in a running fashion. Hemostasis was adequate.  The abdomenirrigated clear of clots and debris.  The peritoneum and the rectus  muscles were approximated with  3 0 vicryl.   The fascia was closed with 0 PDS.  The subcutaneous layer was irrigated and made hemastatic withelectrocautery.  It was closed in a single layer of 3 0 vicryl. The skin was closed with Insorb staples.  The wound was dressed with steri-strips and a pressure dressing.  Counts were correct times 2.  The patient andnewborn were transferred to the recovery room in stable condition.    Seth Morfin MD FACOG  8:47 AM 2021        Ashley WatsonSebastianPaula [[id number]]    Delivery/Placenta Date and Time    Delivery Date: 21 Delivery Time:  8:08 AM      Apgars    Living status: Living   1 Minute 5 Minute 10 Minute 15 Minute 20 Minute   Skin color: 0  1       Heart rate: 2  2       Reflex irritability: 2  2       Muscle tone: 2  2       Respiratory effort: 2  1       Total: 8  8        Apgars assigned by: NEHA RN     Cord    Vessels: 3 Vessels    Cord Blood Disposition: Lab Gases Sent?: No      [place] Resuscitation    Methods: Suctioning, Oxygen, NCPAP, Oximetry, Temp Skin Control, Dustin Puff  Tracheal Suction Passes: 12 ml light green fluid Tracheal Returns: Other    [place] Care at Delivery: Baby to warmer, dried and stimulated,bulb suctioned,. One minute apgar 8. Lung sounds course.CPAP started, then PPV x 1 minute per RT. At 5 minutes, SA02 85% CPAP continues, PPV x 1 minute, then back to CPAP. CPAP off at 6 minutes of age. Delee 12ml mlight meconium fluid  at 8  minutes age ,SA02 down to 89%, CPAP restarted per RT and Raj MORGAN request. At 10 minutes age CPAP continues,at 40%, baby is pink and  and cried with stimulation, SA02 up to 90%. At 15 minutes age SA02 96%  blow by 02 started. -40-pink. At 18 minutes age-VS-98.2 ax-144-40 with mild retractions.Blow by oxygen started,baby Requires stimulation to cry. At 27 minutes age BABY TO NURSERY,TRANSPORT PER WARMER WITH OXYGEN IN PLACE.Raj MORGAN and SELINA HERNANDEZ assisted with transport.  Output in Delivery Room: Stool      Measurements    Weight: 7 lb 0.5 oz       Skin to Skin and Feeding Plan    Skin to skin initiation date/time:     Skin to skin end date/time:     Reason skin to skin not initiated: [place] Acuity  How do you plan to feed your baby: Breastfeeding     Labor Events and Shoulder Dystocia    Fetal Tracing Prior to Delivery: Category 1  Shoulder dystocia present?: Neg     Delivery (Maternal) (Provider to Complete) ([id number])       Blood Loss  Mother: Paula Watson #[id number]   Start of Mother's Information    IO Blood Loss  21 0802 - 21 0808    EBL (mL) Anesthesia 700 mL    Total  700 mL         End of Mother's Information  Mother: Babs Watsone JOHNATHON #[id number]          Delivery - Provider to Complete ([id number])    Delivering clinician: Seth Morfin MD  Delivery Type (Choose the 1 that will go to the Birth  History): , Low Transverse                    Specifics: Repeat   Indications for Repeat: Medical Indication/Planned repeat   Other personnel:  Provider Role   Sergei Anthony MD Pediatrician                Placenta    Delayed Cord Clamping: Done  Removal: Manual Removal  Disposition: Hospital disposal           Presentation and Position    Presentation: Vertex    Position: Middle Occiput Transverse                 Seth Morfin MD

## 2021-02-19 NOTE — ANESTHESIA PREPROCEDURE EVALUATION
Anesthesia Pre-Procedure Evaluation    Patient: Paula Watson   MRN: [id number] : 1995        Preoperative Diagnosis: History of  [Z98.891]   Procedure : Procedure(s):   SECTION     Past Medical History:   Diagnosis Date     History of diabetes insipidus     in first pregnancy     History of diet controlled gestational diabetes mellitus (GDM)     first pregnancy     History of pre-eclampsia     first pregnancy      Past Surgical History:   Procedure Laterality Date      SECTION        Allergies   Allergen Reactions     Bee Venom Anaphylaxis     Morphine Itching and Hives     Tolerates HYDROmorphone (DILUDID)  Tolerates HYDROmorphone (DILUDID)     Buspirone Other (See Comments)     Headache, migraine  migraines     Sertraline Muscle Pain (Myalgia)      Social History     Tobacco Use     Smoking status: Never Smoker     Smokeless tobacco: Never Used   Substance Use Topics     Alcohol use: Not Currently     Frequency: Monthly or less     Drinks per session: 3 or 4     Binge frequency: Never      Wt Readings from Last 1 Encounters:   21 72.8 kg (160 lb 8 oz)        Anesthesia Evaluation   Pt has had prior anesthetic. Type: Regional and General.    No history of anesthetic complications       ROS/MED HX  ENT/Pulmonary: Comment: Remote history of asthma      Neurologic:  - neg neurologic ROS     Cardiovascular:     (+) hypertension-----    METS/Exercise Tolerance: >4 METS    Hematologic:  - neg hematologic  ROS     Musculoskeletal:  - neg musculoskeletal ROS     GI/Hepatic:     (+) GERD, Asymptomatic on medication,     Renal/Genitourinary:  - neg Renal ROS     Endo:  - neg endo ROS     Psychiatric/Substance Use:  - neg psychiatric ROS     Infectious Disease:  - neg infectious disease ROS     Malignancy:  - neg malignancy ROS     Other:      (+) Possibly pregnant, ,         Physical Exam    Airway        Mallampati: II   TM distance: > 3 FB   Neck ROM: full   Mouth opening: > 3  cm    Respiratory Devices and Support         Dental  no notable dental history         Cardiovascular   cardiovascular exam normal       Rhythm and rate: normal     Pulmonary   pulmonary exam normal        breath sounds clear to auscultation           OUTSIDE LABS:  CBC:   Lab Results   Component Value Date    WBC 6.5 02/18/2021    WBC 7.4 12/03/2020    HGB 10.5 (L) 02/18/2021    HGB 10.8 (L) 02/18/2021    HCT 34.8 (L) 02/18/2021    HCT 37.4 12/03/2020     02/18/2021     12/03/2020     BMP:   Lab Results   Component Value Date     02/18/2021     10/29/2020    POTASSIUM 3.7 02/18/2021    POTASSIUM 3.9 10/29/2020    CHLORIDE 104 02/18/2021    CHLORIDE 104 10/29/2020    CO2 23 02/18/2021    CO2 24 10/29/2020    BUN 7 02/18/2021    BUN 10 10/29/2020    CR 0.65 02/18/2021    CR 0.67 10/29/2020    GLC 72 02/18/2021    GLC 84 10/29/2020     COAGS:   Lab Results   Component Value Date    INR 0.81 04/09/2019     POC:   Lab Results   Component Value Date    HCG Positive (A) 05/27/2019     HEPATIC:   Lab Results   Component Value Date    ALBUMIN 3.6 02/18/2021    PROTTOTAL 6.9 02/18/2021    ALT 8 02/18/2021    AST 13 02/18/2021    ALKPHOS 129 (H) 02/18/2021    BILITOTAL 0.9 02/18/2021     OTHER:   Lab Results   Component Value Date    LYDIA 9.1 02/18/2021    T4 0.69 12/03/2020       Anesthesia Plan    ASA Status:  2   NPO Status:  NPO Appropriate    Anesthesia Type: Spinal.              Consents    Anesthesia Plan(s) and associated risks, benefits, and realistic alternatives discussed. Questions answered and patient/representative(s) expressed understanding.     - Discussed with:  Patient         Postoperative Care       PONV prophylaxis: Ondansetron (or other 5HT-3)     Comments:                VICK CASTILLO CRNA

## 2021-02-19 NOTE — ANESTHESIA PROCEDURE NOTES
Pre-Procedure   Staff -   CRNA: Vipul Tolliver APRN CRNA  Performed By: CRNA  Location: OR  Procedure Start/Stop Times: 2/19/2021 7:42 AM and 2/19/2021 7:49 AM  Pre-Anesthestic Checklist: patient identified, IV checked, site marked, risks and benefits discussed, informed consent, monitors and equipment checked, pre-op evaluation and at physician/surgeon's request  Timeout:  Correct Patient: Yes   Correct Procedure: Yes   Correct Site: Yes   Correct Position: Yes   Procedure Documentation  Procedure: intrathecal  Patient Position:sitting  Patient Prep/Sterile Barriers: sterile gloves, mask, Chloraprep, patient draped  Insertion Site: L3-4. (midline approach).  Spinal Needle (gauge): 27   Spinal/LP Needle Length (inches): 3.5  Spinal Needle Type: Davonte tipIntroducer used  # of attempts: 1 and # of redirects:     Assessment/Narrative      Paresthesias: No.  CSF fluid: clear.

## 2021-02-19 NOTE — H&P
[facility]    History and Physical  Obstetrics and Gynecology     Date of Admission:  2021    Assessment & Plan   Paula Watson is a 26 year old female who presents with hypertension in pregnancy  ASSESSMENT:   IUP @ 37w1d for repeat  with gestational hypertension.  NST reactive.  Category  I    PLAN:   Admit - see IP orders    Seth Morfin    History of Present Illness   Paula Watson is a 26 year old female  37w1d  Estimated Date of Delivery: Mar 11, 2021 is calculated from Patient's last menstrual period was 2020. [facility]  She has preeclampsia symptoms with elevated bp. She is planning repeat     PRENATAL COURSE  Prenatal course was complicated by hypertension      Recent Labs   Lab Test 21  1137   ABO A   RH Pos   AS Neg     Rhogam not indicated   Recent Labs   Lab Test 20  1426   HEPBANG Nonreactive   HIAGAB Nonreactive   RUQIGG 9       Past Medical History    I have reviewed this patient's medical history and updated it with pertinent information if needed.   Past Medical History:   Diagnosis Date     History of diabetes insipidus     in first pregnancy     History of diet controlled gestational diabetes mellitus (GDM)     first pregnancy     History of pre-eclampsia     first pregnancy       Past Surgical History   I have reviewed this patient's surgical history and updated it with pertinent information if needed.  Past Surgical History:   Procedure Laterality Date      SECTION         Prior to Admission Medications   Prior to Admission Medications   Prescriptions Last Dose Informant Patient Reported? Taking?   EPINEPHrine (ANY BX GENERIC EQUIV) 0.3 MG/0.3ML injection 2-pack   Yes Yes   Sig: Inject 0.3 mg into the muscle   Prenatal Vit-Fe Fumarate-FA (PRENATAL PO)   Yes Yes   albuterol (PROVENTIL HFA) 108 (90 Base) MCG/ACT inhaler   Yes Yes   Sig: Inhale 1-2 puffs into the lungs every 4 hours as needed    aspirin (ASA) 81 MG chewable tablet 2/18/2021 at 2100  Yes Yes   Sig: Take 81 mg by mouth daily   butalbital-acetaminophen-caffeine (ESGIC) -40 MG tablet   No Yes   Sig: Take 1 tablet by mouth every 4 hours as needed for headaches   famotidine (PEPCID) 20 MG tablet   No Yes   Sig: Take 1 tablet (20 mg) by mouth 2 times daily   hydrOXYzine (ATARAX) 25 MG tablet   Yes Yes   Sig: Take 1 tablet by mouth every 6 hours as needed   levothyroxine (TIROSINT) 125 MCG capsule   Yes Yes   Sig: Take 125 mcg by mouth daily       Facility-Administered Medications: None     Allergies   Allergies   Allergen Reactions     Bee Venom Anaphylaxis     Morphine Itching and Hives     Tolerates HYDROmorphone (DILUDID)  Tolerates HYDROmorphone (DILUDID)     Buspirone Other (See Comments)     Headache, migraine  migraines     Sertraline Muscle Pain (Myalgia)       Social History   I have reviewed this patient's social history and updated it with pertinent information if needed. Paula Watson  reports that she has never smoked. She has never used smokeless tobacco. She reports previous alcohol use. She reports that she does not use drugs.    Family History   I have reviewed this patient's family history and updated it with pertinent information if needed.   History reviewed. No pertinent family history.    Immunization History   Immunizations are up to date    Physical Exam   Temp: 98.8  F (37.1  C) Temp src: Oral BP: (!) 133/91 Pulse: 102   Resp: 16 SpO2: 100 %      Vital Signs with Ranges  Temp:  [98.3  F (36.8  C)-98.8  F (37.1  C)] 98.8  F (37.1  C)  Pulse:  [] 102  Resp:  [16] 16  BP: (116-157)/(60-91) 133/91  SpO2:  [100 %] 100 %    Abdomen: gravid, single vertex fetus, non-tender, EFW 7 lbs      Fetal Heart Tones: 140 baseline, moderate variablility, + accels, no decels and Category I      Constitutional: healthy, alert, active and no distress   Respiratory: No increased work of breathing, good air exchange, clear to  auscultation bilaterally, no crackles or wheezing  Cardiovascular: Normal apical impulse, regular rate and rhythm, normal S1 and S2, no S3 or S4, and no murmur noted  Skin/Extremites: no rashes and no lesions  Neurologic: A&O x 3, DTR's: brisk with 1 beat of clonus, 3+ edema

## 2021-02-19 NOTE — PROGRESS NOTES
RT was present at the C- section. Baby was crying,grunting on auscultation with subcostal, substernal retraction, slightly flexed legs and arms and covered with meconium. RT per MD order start CPAP +5 on room air. O2 Sats was lower for target SpO2, RT per MD order turn O2 on to 30% and then increased to 40%. RT with RN help CPAPing  baby to nursery. In nursery RT per MD order switched CPAP to nasal cannula 40% 3.5 lpm. On this setting O2 Sats 91 to 94%. RN notified and agreed with the plan to wean O2 first as soon as we able.    Graeme Ziegler, RRT

## 2021-02-19 NOTE — PROGRESS NOTES
Patient arrived from home for scheduled repeat . , 37 1/7 weeks, GBS negative.  bpm category 1 tracing. Having a baby boy, planning on breastfeeding, circ wanted, ok with all baby meds to be given. IV infusing, 18 ga to right AC.

## 2021-02-19 NOTE — ANESTHESIA CARE TRANSFER NOTE
Patient: Paula Watson    Procedure(s):   SECTION    Diagnosis: History of  [Z98.891]  Diagnosis Additional Information: No value filed.    Anesthesia Type:   Spinal     Note:      Level of Consciousness: awake  Oxygen Supplementation: room air    Independent Airway: airway patency satisfactory and stable  Dentition: dentition unchanged  Vital Signs Stable: post-procedure vital signs reviewed and stable  Report to RN Given: handoff report given  Patient transferred to: Labor and Delivery    Handoff Report: Identifed the Patient, Identified the Reponsible Provider, Reviewed the pertinent medical history, Discussed the surgical course, Reviewed Intra-OP anesthesia mangement and issues during anesthesia, Set expectations for post-procedure period and Allowed opportunity for questions and acknowledgement of understanding      Vitals: (Last set prior to Anesthesia Care Transfer)  CRNA VITALS  2021 0815 - 2021 0855      2021             NIBP:  93/53    NIBP Mean:  70    Resp Rate (set):  10        Electronically Signed By: VICK CASTILLO CRNA  2021  8:55 AM

## 2021-02-19 NOTE — ANESTHESIA POSTPROCEDURE EVALUATION
Patient: Paula Watson    Procedure(s):   SECTION    Diagnosis:History of  [Z98.891]  Diagnosis Additional Information: No value filed.    Anesthesia Type:  Spinal    Note:  Disposition: Inpatient   Postop Pain Control: Uneventful            Sign Out: Well controlled pain   PONV: No   Neuro/Psych: Uneventful            Sign Out: Acceptable/Baseline neuro status   Airway/Respiratory: Uneventful            Sign Out: Acceptable/Baseline resp. status   CV/Hemodynamics: Uneventful            Sign Out: Acceptable CV status   Other NRE: NONE   DID A NON-ROUTINE EVENT OCCUR? No         Last vitals:  Vitals:    21 1100 21 1132 21 1234   BP: 115/72 121/73 118/78   Pulse: 88 87 96   Resp: 16 16 16   Temp:      SpO2: 100% 100% 100%       Last vitals prior to Anesthesia Care Transfer:  CRNA VITALS  2021 0815 - 2021 0915      2021             NIBP:  93/53    NIBP Mean:  70    Resp Rate (set):  10          Electronically Signed By: IVCK Sherman CRNA  2021  12:57 PM

## 2021-02-19 NOTE — OP NOTE
OB  Delivery Note        Paula Watson MRN# [id number]   Age: 26 year old YOB: 1995         GA: 37w1d  GP:   Labor Complications:    EBL:   mL  Delivery QBL:    Delivery Type: , Low Transverse   ROM to Delivery Time: rupture date or rupture time have not been documented       1 Minute 5 Minute 10 Minute   Apgar Totals:   8   8      Personel Present: DONTA GARCIA PA student   Details     Pre-Op Diagnosis: 1. Intrauterine pregnancy at 37w1d  2. Gestational hypertension   Post-Op Diagnosis: 1. Same  Liveborn male infant   Indications:   Hypertension   Procedure:   , Low Transverse  via   incision   Anesthesia:  Spinal      Informed Consent:  The risks, benefits, complications, and alternatives were discussed with the patient. The patient understood that the risks of  section include, but are not limited to: injury to nearby structures or organs, infection, blood loss and possible need for transfusion, and potential need for more surgery including hysterectomy. The patient stated understanding and desired to proceed. All questions were answered. The site of surgery was properly noted and marked. The patient was identified as Paula Watson and the procedure verified as a  delivery. A Time Out was held and the above information confirmed.     Procedure Details:  DESCRIPTION OF PROCEDURE:     The patient was transferred to the OR where spinal anesthesia was accomplished.  A aguillon catheter was inserted and clear urine was noted.  The abdomen was scrubbed prepped and draped inthe usual manner.  A hard stop timeout was accomplished.  A transverse Pfannenstiel incision was made and sharp and electrocautery dissection carried down to the fascial layer.  The Fascia was opened in the midline andextended bluntly bilaterally.  The rectus muscles were  in the midline bluntly.  The peritoneum was bluntly divided and the  Shanon ring retractor was placed.  A bladder flap was made sharply.  The lower uterinesegment was incised sharply in a low transverse fashion and the amniotic sac ruptured bluntly with a finger.  Clear fluid was noted and the incision extended bluntly.  The fetal head was deliverd in the OT position.  The infant received nasal and oropharyngeal suction with the bulb suction. Nuchal cord x1 was noted and reduced. The shoulders delivered atraumatically followed by the remainder of the baby.   The cord was clamped after 30 seconds and cut and the infant handed to the  nurse.   evaluation was accomplished by Dr. Anthony at my request due to early term delivery and known risks of  respiratory support needs.  The placenta was manually removed and the uterus wiped clear of clots and debris. Pitocin was added to the IVinfusion and the uterus was noted to firm-up nicely. The tubes and ovaries appeared normal. The uterine incision was closed in a double layer of #1 Chromic in a running fashion. Hemostasis was adequate.  The abdomenirrigated clear of clots and debris.  The peritoneum and the rectus  muscles were approximated with  3 0 vicryl.   The fascia was closed with 0 PDS.  The subcutaneous layer was irrigated and made hemastatic withelectrocautery.  It was closed in a single layer of 3 0 vicryl. The skin was closed with Insorb staples.  The wound was dressed with steri-strips and a pressure dressing.  Counts were correct times 2.  The patient andnewborn were transferred to the recovery room in stable condition.     Seth Morfin MD FACOG  8:47 AM 2021               Labor Events           Walter Deboraylee [[id number]]            Delivery/Placenta Date and Time            Delivery Date: 21 Delivery Time:  8:08 AM              Delivery (Maternal) (Provider to Complete) ([id number])                      Blood Loss  Mother: Paula Watson #[id number]          Start of Mother's Information            IO Blood Loss  21 0802 - 21 0845            EBL (mL) Anesthesia 700 mL     Total   700 mL                          End of Mother's Information  Mother: Paula Watson #[id number]                     Delivery - Provider to Complete ([id number])    Delivering clinician: Seth Morfin MD  Delivery Type (Choose the 1 that will go to the Birth History): , Low Transverse                        Specifics: Repeat   Indications for Repeat: Medical Indication/Planned repeat   Other personnel:  Provider Role   Sergei Anthony MD Pediatrician                  Placenta    Delayed Cord Clamping: Done  Removal: Manual Removal  Disposition: Hospital disposal              Presentation and Position    Presentation: Vertex           Position: Middle Occiput Transverse                        Seth Morfin MD      Routing History

## 2021-02-20 LAB — HGB BLD-MCNC: 8.7 G/DL (ref 11.7–15.7)

## 2021-02-20 PROCEDURE — 120N000001 HC R&B MED SURG/OB

## 2021-02-20 PROCEDURE — 85018 HEMOGLOBIN: CPT | Performed by: OBSTETRICS & GYNECOLOGY

## 2021-02-20 PROCEDURE — 250N000013 HC RX MED GY IP 250 OP 250 PS 637: Performed by: OBSTETRICS & GYNECOLOGY

## 2021-02-20 PROCEDURE — 90471 IMMUNIZATION ADMIN: CPT | Performed by: OBSTETRICS & GYNECOLOGY

## 2021-02-20 PROCEDURE — 90707 MMR VACCINE SC: CPT | Performed by: OBSTETRICS & GYNECOLOGY

## 2021-02-20 PROCEDURE — 250N000011 HC RX IP 250 OP 636: Performed by: OBSTETRICS & GYNECOLOGY

## 2021-02-20 PROCEDURE — 36416 COLLJ CAPILLARY BLOOD SPEC: CPT | Performed by: OBSTETRICS & GYNECOLOGY

## 2021-02-20 PROCEDURE — 99207 PR NO CHARGE LOS: CPT | Performed by: OBSTETRICS & GYNECOLOGY

## 2021-02-20 RX ORDER — FERROUS SULFATE 325(65) MG
325 TABLET, DELAYED RELEASE (ENTERIC COATED) ORAL DAILY
Status: DISCONTINUED | OUTPATIENT
Start: 2021-02-20 | End: 2021-02-22 | Stop reason: HOSPADM

## 2021-02-20 RX ADMIN — SIMETHICONE 80 MG: 80 TABLET, CHEWABLE ORAL at 20:15

## 2021-02-20 RX ADMIN — PRENATAL VIT W/ FE FUMARATE-FA TAB 27-0.8 MG 1 TABLET: 27-0.8 TAB at 10:11

## 2021-02-20 RX ADMIN — LEVOTHYROXINE SODIUM 62.5 MCG: 0.12 TABLET ORAL at 10:20

## 2021-02-20 RX ADMIN — FERROUS SULFATE TAB EC 325 MG (65 MG FE EQUIVALENT) 325 MG: 325 (65 FE) TABLET DELAYED RESPONSE at 10:12

## 2021-02-20 RX ADMIN — ACETAMINOPHEN 975 MG: 325 TABLET ORAL at 22:19

## 2021-02-20 RX ADMIN — MEASLES, MUMPS, AND RUBELLA VIRUS VACCINE LIVE 0.5 ML: 1000; 12500; 1000 INJECTION, POWDER, LYOPHILIZED, FOR SUSPENSION SUBCUTANEOUS at 10:13

## 2021-02-20 RX ADMIN — IBUPROFEN 800 MG: 400 TABLET ORAL at 20:15

## 2021-02-20 RX ADMIN — OXYCODONE HYDROCHLORIDE 5 MG: 5 TABLET ORAL at 22:19

## 2021-02-20 RX ADMIN — DOCUSATE SODIUM 50 MG AND SENNOSIDES 8.6 MG 2 TABLET: 8.6; 5 TABLET, FILM COATED ORAL at 10:11

## 2021-02-20 RX ADMIN — IBUPROFEN 800 MG: 400 TABLET ORAL at 08:06

## 2021-02-20 RX ADMIN — KETOROLAC TROMETHAMINE 30 MG: 30 INJECTION, SOLUTION INTRAMUSCULAR at 02:26

## 2021-02-20 RX ADMIN — ACETAMINOPHEN 975 MG: 325 TABLET ORAL at 12:37

## 2021-02-20 RX ADMIN — OXYCODONE HYDROCHLORIDE 5 MG: 5 TABLET ORAL at 15:21

## 2021-02-20 RX ADMIN — ACETAMINOPHEN 975 MG: 325 TABLET ORAL at 06:21

## 2021-02-20 RX ADMIN — IBUPROFEN 800 MG: 400 TABLET ORAL at 15:18

## 2021-02-20 RX ADMIN — OXYCODONE HYDROCHLORIDE 5 MG: 5 TABLET ORAL at 06:22

## 2021-02-20 RX ADMIN — OXYCODONE HYDROCHLORIDE 5 MG: 5 TABLET ORAL at 00:04

## 2021-02-20 NOTE — PROGRESS NOTES
[facility]    Obstetrics Post-Op / Progress Note    Assessment & Plan   Assessment:  -1 Day Post-Op  Procedure(s):   SECTION    Doing well.  Clean wound without signs of infection.  No excessive bleeding  Pain well-controlled.    Plan:  Ambulation encouraged  Monitor wound for signs of infection  Pain control measures as needed  Start iron supplementation  Anticipate discharge in 2 days    Seth Morfin     Interval History   Doing well.  Pain is well-controlled.  No fevers.  No history of wound drainage, warmth or significant erythema.  Good appetite.  Denies chest pain, shortness of breath, nausea or vomiting.  Ambulatory.  Breastfeeding well. Baby has had issues with TTN, desaturation.    Medications     dextrose 5% lactated ringers Stopped (21 1235)     NO Rho (D) immune globulin (RhoGam) needed - mother Rh POSITIVE       - MEDICATION INSTRUCTIONS -       oxytocin in 0.9% NaCl 100 mL/hr (21 1013)     oxytocin in 0.9% NaCl         acetaminophen  975 mg Oral Q6H     ibuprofen  800 mg Oral Q6H     levothyroxine  62.5 mcg Oral Daily     Measles, Mumps & Rubella Vac  0.5 mL Subcutaneous Once     prenatal multivitamin w/iron  1 tablet Oral Daily     senna-docusate  1 tablet Oral BID    Or     senna-docusate  2 tablet Oral BID       Physical Exam   Temp: 98.1  F (36.7  C) Temp src: Axillary BP: 133/72 Pulse: 72   Resp: 18 SpO2: 99 % O2 Device: None (Room air)    There were no vitals filed for this visit.  Vital Signs with Ranges  Temp:  [97  F (36.1  C)-98.3  F (36.8  C)] 98.1  F (36.7  C)  Pulse:  [72-96] 72  Resp:  [6-23] 18  BP: ()/(43-83) 133/72  SpO2:  [99 %-100 %] 99 %  I/O last 3 completed shifts:  In: 900 [I.V.:900]  Out: 2543 [Urine:1825; Other:18; Blood:700]    Uterine fundus is firm, non-tender and at the level of the umbilicus  Incision C/D/I  Extremities Non-tender    Data   Recent Labs   Lab Test 21  0630   ABO A   RH Pos   AS Neg     Recent Labs    Lab Test 02/20/21  0544 02/18/21  1130   HGB 8.7* 10.5*     Recent Labs   Lab Test 08/13/20  1426   RUQIGG 9

## 2021-02-20 NOTE — PROGRESS NOTES
Medicated throughout the night.Up ad tj. Dressing dry and intact. Passing flatus. Passed a couple of clots but uterus remains firm. Nursing and pumping independently.Abdominal binder placed.

## 2021-02-20 NOTE — PLAN OF CARE
Patient denied pain this morning- discussed pain management and encouraged ambulation as tolerated.  Lungs clear, heart regular, bowel sounds active- passing gas, 1+ edema in BLE, CMS/peripheral pulses intact.  Incision to abdomen covered with dressing- CDI with no visible drainage and surrounding tissue no redness/swelling/heat.  Appetite good this morning.  Has small amount of bleeding with no clots on vinny pad.  Nipple intact but sore- using nipple pads and lanolin- has been pumping and nursing.  Voiding clear/yellow urine. Shaunna Pool RN on 2/20/2021 at 9:11 AM

## 2021-02-21 PROBLEM — D62 ANEMIA DUE TO BLOOD LOSS, ACUTE: Status: ACTIVE | Noted: 2021-02-21

## 2021-02-21 PROBLEM — O99.013 ANEMIA DURING PREGNANCY IN THIRD TRIMESTER: Status: ACTIVE | Noted: 2021-02-21

## 2021-02-21 PROCEDURE — 99207 PR NO CHARGE LOS: CPT | Performed by: OBSTETRICS & GYNECOLOGY

## 2021-02-21 PROCEDURE — 120N000001 HC R&B MED SURG/OB

## 2021-02-21 PROCEDURE — 250N000013 HC RX MED GY IP 250 OP 250 PS 637: Performed by: OBSTETRICS & GYNECOLOGY

## 2021-02-21 RX ADMIN — ACETAMINOPHEN 975 MG: 325 TABLET ORAL at 17:55

## 2021-02-21 RX ADMIN — IBUPROFEN 800 MG: 400 TABLET ORAL at 15:39

## 2021-02-21 RX ADMIN — ACETAMINOPHEN 975 MG: 325 TABLET ORAL at 12:27

## 2021-02-21 RX ADMIN — IBUPROFEN 800 MG: 400 TABLET ORAL at 21:20

## 2021-02-21 RX ADMIN — ACETAMINOPHEN 975 MG: 325 TABLET ORAL at 04:05

## 2021-02-21 RX ADMIN — SENNOSIDES AND DOCUSATE SODIUM 1 TABLET: 8.6; 5 TABLET ORAL at 21:16

## 2021-02-21 RX ADMIN — SENNOSIDES AND DOCUSATE SODIUM 1 TABLET: 8.6; 5 TABLET ORAL at 21:18

## 2021-02-21 RX ADMIN — IBUPROFEN 800 MG: 400 TABLET ORAL at 01:49

## 2021-02-21 RX ADMIN — LEVOTHYROXINE SODIUM 62.5 MCG: 0.12 TABLET ORAL at 08:02

## 2021-02-21 RX ADMIN — OXYCODONE HYDROCHLORIDE 5 MG: 5 TABLET ORAL at 04:05

## 2021-02-21 RX ADMIN — SENNOSIDES AND DOCUSATE SODIUM 1 TABLET: 8.6; 5 TABLET ORAL at 07:59

## 2021-02-21 RX ADMIN — PRENATAL VIT W/ FE FUMARATE-FA TAB 27-0.8 MG 1 TABLET: 27-0.8 TAB at 12:28

## 2021-02-21 RX ADMIN — IBUPROFEN 800 MG: 400 TABLET ORAL at 07:59

## 2021-02-21 RX ADMIN — SIMETHICONE 80 MG: 80 TABLET, CHEWABLE ORAL at 07:59

## 2021-02-21 RX ADMIN — FERROUS SULFATE TAB EC 325 MG (65 MG FE EQUIVALENT) 325 MG: 325 (65 FE) TABLET DELAYED RESPONSE at 12:27

## 2021-02-21 NOTE — PROGRESS NOTES
Up[ad tj. Showered.dressing removed. Serous sang fluid noted mid incision, pressure dressing applied. Legs 2 plus edema at bedtime, 1 plus this am.Pain controlled. Nipples sore, warm  moist packs to nipples and breast shells. VSS.

## 2021-02-21 NOTE — PLAN OF CARE
"2020.   Diaper cares given.O2 sats down to the 80\"s according to both monitors FI02 increased to 30%, no recovery increased FI02 increased to 40% recovers to 90's over 1 minute 10 seconds. Fio2 back down to RA.  2035  Return to breast. FI02 increased to 30% at 2 liters per NC. Zeus feedings well.   "

## 2021-02-21 NOTE — PROGRESS NOTES
[facility]    Obstetrics Post-Op / Progress Note    Assessment & Plan   Assessment:  -2 Days Post-Op  Procedure(s):   SECTION    Doing well.  Clean wound without signs of infection.  No excessive bleeding  Pain well-controlled.  Baby is in nursing and weaning off oxygen from RDS/TTN    Plan:  Ambulation encouraged  Anticipate discharge tomorrow or when baby is ready for dismissal.    Seth Morfin     Interval History   Doing well.  Pain is well-controlled.  No fevers.  No history of wound drainage, warmth or significant erythema.  Good appetite.  Denies chest pain, shortness of breath, nausea or vomiting.  Ambulatory.  Breastfeeding well.    Medications     dextrose 5% lactated ringers Stopped (21 1235)     NO Rho (D) immune globulin (RhoGam) needed - mother Rh POSITIVE       - MEDICATION INSTRUCTIONS -       oxytocin in 0.9% NaCl 100 mL/hr (21 1013)     oxytocin in 0.9% NaCl         acetaminophen  975 mg Oral Q6H     ferrous sulfate  325 mg Oral Daily     ibuprofen  800 mg Oral Q6H     levothyroxine  62.5 mcg Oral Daily     prenatal multivitamin w/iron  1 tablet Oral Daily     senna-docusate  1 tablet Oral BID    Or     senna-docusate  2 tablet Oral BID       Physical Exam   Temp: 97.6  F (36.4  C) Temp src: Axillary BP: 114/72 Pulse: 82   Resp: 18 SpO2: 98 %      There were no vitals filed for this visit.  Vital Signs with Ranges  Temp:  [97.6  F (36.4  C)-98.3  F (36.8  C)] 97.6  F (36.4  C)  Pulse:  [82-92] 82  Resp:  [16-84] 18  BP: (114-120)/(72-85) 114/72  SpO2:  [98 %-100 %] 98 %  I/O last 3 completed shifts:  In: -   Out: 3 [Other:3]    Uterine fundus is firm, non-tender and at the level of the umbilicus  Incision C/D/I  Extremities Non-tender    Data   Recent Labs   Lab Test 21  0630   ABO A   RH Pos   AS Neg     Recent Labs   Lab Test 21  0544 21  1130   HGB 8.7* 10.5*     Recent Labs   Lab Test 20  1426   RUQIGG 9

## 2021-02-22 VITALS
HEART RATE: 88 BPM | TEMPERATURE: 97.9 F | OXYGEN SATURATION: 99 % | SYSTOLIC BLOOD PRESSURE: 129 MMHG | RESPIRATION RATE: 16 BRPM | DIASTOLIC BLOOD PRESSURE: 77 MMHG

## 2021-02-22 PROCEDURE — 250N000013 HC RX MED GY IP 250 OP 250 PS 637: Performed by: OBSTETRICS & GYNECOLOGY

## 2021-02-22 RX ORDER — IBUPROFEN 600 MG/1
600 TABLET, FILM COATED ORAL EVERY 6 HOURS PRN
Qty: 30 TABLET | Refills: 0 | Status: SHIPPED | OUTPATIENT
Start: 2021-02-22 | End: 2022-01-31

## 2021-02-22 RX ORDER — FERROUS SULFATE 325(65) MG
325 TABLET, DELAYED RELEASE (ENTERIC COATED) ORAL DAILY
Qty: 30 TABLET | Refills: 0 | Status: SHIPPED | OUTPATIENT
Start: 2021-02-22 | End: 2022-01-31

## 2021-02-22 RX ADMIN — ACETAMINOPHEN 975 MG: 325 TABLET ORAL at 05:53

## 2021-02-22 RX ADMIN — PRENATAL VIT W/ FE FUMARATE-FA TAB 27-0.8 MG 1 TABLET: 27-0.8 TAB at 09:06

## 2021-02-22 RX ADMIN — ACETAMINOPHEN 975 MG: 325 TABLET ORAL at 12:43

## 2021-02-22 RX ADMIN — IBUPROFEN 800 MG: 400 TABLET ORAL at 03:02

## 2021-02-22 RX ADMIN — LEVOTHYROXINE SODIUM 62.5 MCG: 0.12 TABLET ORAL at 09:05

## 2021-02-22 RX ADMIN — SENNOSIDES AND DOCUSATE SODIUM 1 TABLET: 8.6; 5 TABLET ORAL at 09:05

## 2021-02-22 RX ADMIN — IBUPROFEN 800 MG: 400 TABLET ORAL at 09:05

## 2021-02-22 RX ADMIN — FERROUS SULFATE TAB EC 325 MG (65 MG FE EQUIVALENT) 325 MG: 325 (65 FE) TABLET DELAYED RESPONSE at 09:05

## 2021-02-22 RX ADMIN — ACETAMINOPHEN 975 MG: 325 TABLET ORAL at 00:17

## 2021-02-22 NOTE — DISCHARGE SUMMARY
[facility]    Discharge Summary  Obstetrics    Date of Admission:  2021  Date of Discharge:  2021  Discharging Provider: Seth Morfin    Discharge Diagnoses   History of  [Z98.891]  Anemia of pregnancy  Acute postop blood loss anemia    Procedure/Surgery Information   Procedure: Procedure(s):   SECTION   Surgeon(s): Surgeon(s) and Role:     * Seth Morfin MD - Primary     * Sergei Anthony MD - Assisting     History of Present Illness   Paula Watson is a 26 year old female who presented with hypertension in pregnancy, repeat  section    Hospital Course   The patient's hospital course was unremarkable.  She recovered as anticipated and experienced no post-operative complications.  On discharge, her pain was well controlled. Vaginal bleeding is similar to peak menstrual flow.  Voiding without difficulty.  Ambulating well and tolerating a normal diet.  No fever or significant wound drainage.  Breastfeeding well.  Infant is stable.  She was discharged on post-partum day #3.    Post-partum hemoglobin:   Hemoglobin   Date Value Ref Range Status   2021 8.7 (L) 11.7 - 15.7 g/dL Final       Seth Morfin MD    Discharge Disposition   Discharged to home   Condition at discharge: Stable  Her eliana Hagan will be assisting her with  care and activities of daily living    Pending Results   n/a  Unresulted Labs Ordered in the Past 30 Days of this Admission     No orders found from 2021 to 2021.          Primary Care Physician   Juan Luis Rivera    Consultations This Hospital Stay   HOME CARE POST PARTUM/ IP CONSULT  LACTATION IP CONSULT    Discharge Orders      Activity    Review discharge instructions     Reason for your hospital stay    Maternity care     Follow Up and recommended labs and tests    Follow up with me,  Seth Morfin MD, within 6 weeks. for hospital follow- up.  No follow up labs or test are needed.      Discharge Instructions - Postpartum visit    Schedule postpartum visit with your provider and return to clinic in 6 weeks. Also 2 weeks if a  section was done.     Diet    Resume previous diet     Discharge Medications   Current Discharge Medication List      START taking these medications    Details   ferrous sulfate (FE TABS) 325 (65 Fe) MG EC tablet Take 1 tablet (325 mg) by mouth daily  Qty: 30 tablet, Refills: 0    Associated Diagnoses: History of       ibuprofen (ADVIL/MOTRIN) 600 MG tablet Take 1 tablet (600 mg) by mouth every 6 hours as needed for moderate pain  Qty: 30 tablet, Refills: 0    Associated Diagnoses: History of          CONTINUE these medications which have NOT CHANGED    Details   albuterol (PROVENTIL HFA) 108 (90 Base) MCG/ACT inhaler Inhale 1-2 puffs into the lungs every 4 hours as needed      butalbital-acetaminophen-caffeine (ESGIC) -40 MG tablet Take 1 tablet by mouth every 4 hours as needed for headaches  Qty: 20 tablet, Refills: 1    Associated Diagnoses: Other migraine without status migrainosus, intractable      EPINEPHrine (ANY BX GENERIC EQUIV) 0.3 MG/0.3ML injection 2-pack Inject 0.3 mg into the muscle      famotidine (PEPCID) 20 MG tablet Take 1 tablet (20 mg) by mouth 2 times daily  Qty: 60 tablet, Refills: 1    Associated Diagnoses: Gastroesophageal reflux disease with esophagitis without hemorrhage      hydrOXYzine (ATARAX) 25 MG tablet Take 1 tablet by mouth every 6 hours as needed  Refills: 1      levothyroxine (TIROSINT) 125 MCG capsule Take 62.5 mcg by mouth daily       Prenatal Vit-Fe Fumarate-FA (PRENATAL PO)          STOP taking these medications       aspirin (ASA) 81 MG chewable tablet Comments:   Reason for Stopping:             Allergies   Allergies   Allergen Reactions     Bee Venom Anaphylaxis     Morphine Itching and Hives     Tolerates HYDROmorphone (DILUDID)  Tolerates HYDROmorphone (DILUDID)     Buspirone Other (See Comments)      Headache, migraine  migraines     Sertraline Muscle Pain (Myalgia)

## 2021-02-22 NOTE — PROGRESS NOTES
DISCHARGE NOTE    Patient discharged to home at 1:39 PM via ambulation. Accompanied by significant other and staff. Discharge instructions reviewed with patient, opportunity offered to ask questions. Prescriptions sent to patients preferred pharmacy. All belongings sent with patient. 4-part ID bands matched with baby's.     Alana Arriaga RN

## 2021-02-22 NOTE — PROGRESS NOTES
[facility]    Obstetrics Post-Op / Progress Note    Assessment & Plan   Assessment:  -3 Days Post-Op  Procedure(s):   SECTION    Doing well.  Clean wound without signs of infection.  No immediate surgical complications identified.  Pain well-controlled.    Plan:  Ambulation encouraged  Monitor wound for signs of infection  Pain control measures as needed    Seth Morfin     Interval History   Doing well.  Pain is well-controlled.  No fevers.  No history of wound drainage, warmth or significant erythema.  Good appetite.  Denies chest pain, shortness of breath, nausea or vomiting.  Ambulatory.  Breastfeeding well.    Medications     dextrose 5% lactated ringers Stopped (21 1235)     NO Rho (D) immune globulin (RhoGam) needed - mother Rh POSITIVE       - MEDICATION INSTRUCTIONS -       oxytocin in 0.9% NaCl 100 mL/hr (21 1013)     oxytocin in 0.9% NaCl         acetaminophen  975 mg Oral Q6H     ferrous sulfate  325 mg Oral Daily     ibuprofen  800 mg Oral Q6H     levothyroxine  62.5 mcg Oral Daily     prenatal multivitamin w/iron  1 tablet Oral Daily     senna-docusate  1 tablet Oral BID    Or     senna-docusate  2 tablet Oral BID       Physical Exam   Temp: 98.2  F (36.8  C) Temp src: Oral BP: 134/72 Pulse: 82   Resp: 18 SpO2: 98 %      There were no vitals filed for this visit.  Vital Signs with Ranges  Temp:  [98.2  F (36.8  C)-98.4  F (36.9  C)] 98.2  F (36.8  C)  Pulse:  [82-88] 82  Resp:  [16-18] 18  BP: (120-134)/(72-80) 134/72  SpO2:  [98 %-99 %] 98 %  No intake/output data recorded.    Uterine fundus is firm, non-tender and at the level of the umbilicus  Incision C/D/I  Extremities Non-tender    Data   Recent Labs   Lab Test 21  0630   ABO A   RH Pos   AS Neg     Recent Labs   Lab Test 21  0544 21  1130   HGB 8.7* 10.5*     Recent Labs   Lab Test 20  1426   RUQIGG 9

## 2021-02-22 NOTE — LACTATION NOTE
This note was copied from a baby's chart.  INPATIENT LACTATION CONSULT      Consult with Paula and dorothea regarding breastfeeding.  Obvious rooting with a strong latch observed this feeding session.  Rhythmic and aggressive suckling also noted.  Instructed Paula on correct positioning and technique when latching babe on.  Paula is independent with latching babe onto breast.  Minimal assistance required.  Encouraged Paula on the importance of frequent feedings throughout the day (at least 8-12 feedings in a 24 hour period) and skin to skin contact.  Paula demonstrated and states she understands all information given.    Stephanie Saldaña RN, IBCLC  Lactation Consultant  [facility]

## 2021-03-04 ENCOUNTER — MEDICAL CORRESPONDENCE (OUTPATIENT)
Dept: HEALTH INFORMATION MANAGEMENT | Facility: OTHER | Age: 26
End: 2021-03-04

## 2021-03-04 ENCOUNTER — OFFICE VISIT (OUTPATIENT)
Dept: OBGYN | Facility: OTHER | Age: 26
End: 2021-03-04
Attending: OBSTETRICS & GYNECOLOGY
Payer: COMMERCIAL

## 2021-03-04 VITALS
OXYGEN SATURATION: 99 % | HEART RATE: 64 BPM | DIASTOLIC BLOOD PRESSURE: 70 MMHG | WEIGHT: 144.6 LBS | BODY MASS INDEX: 24.82 KG/M2 | SYSTOLIC BLOOD PRESSURE: 108 MMHG

## 2021-03-04 DIAGNOSIS — E03.9 HYPOTHYROIDISM, UNSPECIFIED TYPE: Primary | ICD-10-CM

## 2021-03-04 PROCEDURE — 99024 POSTOP FOLLOW-UP VISIT: CPT | Performed by: OBSTETRICS & GYNECOLOGY

## 2021-03-04 ASSESSMENT — PAIN SCALES - GENERAL: PAINLEVEL: NO PAIN (0)

## 2021-03-04 NOTE — PROGRESS NOTES
Paula Watson presents todayfor a postop checkup at 2 weeks after repeat     S: Bowels and bladder are functioning normally, no abnormal bleeding or pain. No concerns about the incision(s).    Current Outpatient Medications   Medication     albuterol (PROVENTIL HFA) 108 (90 Base) MCG/ACT inhaler     butalbital-acetaminophen-caffeine (ESGIC) -40 MG tablet     EPINEPHrine (ANY BX GENERIC EQUIV) 0.3 MG/0.3ML injection 2-pack     famotidine (PEPCID) 20 MG tablet     ferrous sulfate (FE TABS) 325 (65 Fe) MG EC tablet     hydrOXYzine (ATARAX) 25 MG tablet     ibuprofen (ADVIL/MOTRIN) 600 MG tablet     levothyroxine (TIROSINT) 125 MCG capsule     Prenatal Vit-Fe Fumarate-FA (PRENATAL PO)     No current facility-administered medications for this visit.      Allergies   Allergen Reactions     Bee Venom Anaphylaxis     Morphine Itching and Hives     Tolerates HYDROmorphone (DILUDID)  Tolerates HYDROmorphone (DILUDID)     Buspirone Other (See Comments)     Headache, migraine  migraines     Sertraline Muscle Pain (Myalgia)     Past Medical History:   Diagnosis Date     History of diabetes insipidus     in first pregnancy     History of diet controlled gestational diabetes mellitus (GDM)     first pregnancy     History of pre-eclampsia     first pregnancy     Past Surgical History:   Procedure Laterality Date      SECTION        SECTION N/A 2021    Procedure:  SECTION;  Surgeon: Seth Morfin MD;  Location:  OR       COMPLETE REVIEW OF SYSTEMS: Neg except as above        O: /70 (BP Location: Right arm, Patient Position: Sitting, Cuff Size: Adult Regular)   Pulse 64   Wt 65.6 kg (144 lb 9.6 oz)   LMP 2020   SpO2 99%   BMI 24.82 kg/m   Body mass index is 24.82 kg/m .    EXAM:  NAD  Abdomen: NT, ND, incision is CDI      I/P:  Doing well    Recheck in a month  Restrictions reiterated      Seth Morfin MD FACOG  2:05 PM 3/4/2021

## 2021-03-04 NOTE — NURSING NOTE
Patient here for 2 week post op from a , pt states she s doing well and has a headache.     Medication Reconciliation: elijah Galarza LPN  3/4/2021 1:47 PM

## 2021-04-01 ENCOUNTER — PRENATAL OFFICE VISIT (OUTPATIENT)
Dept: OBGYN | Facility: OTHER | Age: 26
End: 2021-04-01
Attending: OBSTETRICS & GYNECOLOGY
Payer: COMMERCIAL

## 2021-04-01 VITALS
HEART RATE: 84 BPM | SYSTOLIC BLOOD PRESSURE: 118 MMHG | BODY MASS INDEX: 24.22 KG/M2 | WEIGHT: 141.1 LBS | DIASTOLIC BLOOD PRESSURE: 64 MMHG | OXYGEN SATURATION: 100 %

## 2021-04-01 DIAGNOSIS — Z12.4 CERVICAL CANCER SCREENING: Primary | ICD-10-CM

## 2021-04-01 DIAGNOSIS — Z30.09 BIRTH CONTROL COUNSELING: ICD-10-CM

## 2021-04-01 DIAGNOSIS — E03.9 HYPOTHYROIDISM, UNSPECIFIED TYPE: ICD-10-CM

## 2021-04-01 DIAGNOSIS — E03.9 HYPOTHYROIDISM, UNSPECIFIED TYPE: Primary | ICD-10-CM

## 2021-04-01 LAB — TSH SERPL DL<=0.05 MIU/L-ACNC: 2.31 IU/ML (ref 0.34–5.6)

## 2021-04-01 PROCEDURE — 36415 COLL VENOUS BLD VENIPUNCTURE: CPT | Mod: ZL | Performed by: OBSTETRICS & GYNECOLOGY

## 2021-04-01 PROCEDURE — 99207 PR POST-PARTUM 6 WK VISIT - GICH ONLY: CPT | Performed by: OBSTETRICS & GYNECOLOGY

## 2021-04-01 PROCEDURE — 84443 ASSAY THYROID STIM HORMONE: CPT | Mod: ZL | Performed by: OBSTETRICS & GYNECOLOGY

## 2021-04-01 PROCEDURE — G0123 SCREEN CERV/VAG THIN LAYER: HCPCS | Performed by: OBSTETRICS & GYNECOLOGY

## 2021-04-01 RX ORDER — ACETAMINOPHEN AND CODEINE PHOSPHATE 120; 12 MG/5ML; MG/5ML
0.35 SOLUTION ORAL DAILY
Qty: 84 TABLET | Refills: 3 | Status: SHIPPED | OUTPATIENT
Start: 2021-04-01 | End: 2021-12-03

## 2021-04-01 ASSESSMENT — PAIN SCALES - GENERAL: PAINLEVEL: NO PAIN (0)

## 2021-04-01 NOTE — PROGRESS NOTES
CC: postpartum exam at 6 weeks from delivery    HPI: Paula Watson presents for postpartum exam. Baby was born by repeat  delivery. Complications included None  Mood:OK    Breastfeeding:yes  Incision(s): OK  Bleeding: No  Birthcontrol: micronor    Past Medical History:   Diagnosis Date     History of diabetes insipidus     in first pregnancy     History of diet controlled gestational diabetes mellitus (GDM)     first pregnancy     History of pre-eclampsia     first pregnancy     Past Surgical History:   Procedure Laterality Date      SECTION        SECTION N/A 2021    Procedure:  SECTION;  Surgeon: Seth Morfin MD;  Location:  OR     Current Outpatient Medications   Medication     ferrous sulfate (FE TABS) 325 (65 Fe) MG EC tablet     ibuprofen (ADVIL/MOTRIN) 600 MG tablet     Prenatal Vit-Fe Fumarate-FA (PRENATAL PO)     albuterol (PROVENTIL HFA) 108 (90 Base) MCG/ACT inhaler     butalbital-acetaminophen-caffeine (ESGIC) -40 MG tablet     EPINEPHrine (ANY BX GENERIC EQUIV) 0.3 MG/0.3ML injection 2-pack     hydrOXYzine (ATARAX) 25 MG tablet     levothyroxine (TIROSINT) 125 MCG capsule     No current facility-administered medications for this visit.       Allergies   Allergen Reactions     Bee Venom Anaphylaxis     Morphine Itching and Hives     Tolerates HYDROmorphone (DILUDID)  Tolerates HYDROmorphone (DILUDID)     Buspirone Other (See Comments)     Headache, migraine  migraines     Sertraline Muscle Pain (Myalgia)       REVIEW OF SYSTEMS:  Neg for bowel, bladder, and breast    O: /64 (BP Location: Right arm, Patient Position: Sitting, Cuff Size: Adult Regular)   Pulse 84   Wt 64 kg (141 lb 1.6 oz)   LMP 2020   SpO2 100%   BMI 24.22 kg/m    Body mass index is 24.22 kg/m .    Exam:  Constitutional: healthy, alert, active and no distress  Pelvic: Normal BUSE, vulva appears normal, vaginal and cervix are normal. Pap obtained. Uterus is normal size,  shape position and mobility without adnexal mass or tenderness. Chaperone was present.      [place] Depression Scale  Thoughts of Harming Self: 0-->never  Total Score: 6        No results found for any visits on 04/01/21.      I/P:  Postpartum visit.    Resume annual preventive healthcare. Continue PNV's until done with childbearing.    RTC prn    Seth Morfin MD FACOG  9:08 AM 4/1/2021

## 2021-04-01 NOTE — LETTER
April 7, 2021      Paula Watson  [address] [place] [id number]        Dear ,    I am happy to inform you that your recent cervical cancer screening test (PAP smear) was normal.      Preventative screenings such as this help to ensure your health for years to come. You should repeat a pap smear in 3 years, unless otherwise directed.      You will still need to return to the clinic every year for your annual exam and other preventive tests.     If you have additional questions regarding this result, please call our registered nurse at [phone].    Sincerely,    Seth Morfin MD

## 2021-04-01 NOTE — NURSING NOTE
Patient here for 6 week post partum exam. States she is doing well but still bleeding or spotting depending on the day.   Pt is breastfeeding and would like something for birth control.     Medication Reconciliation: elijah Galarza LPN  4/1/2021 8:52 AM

## 2021-04-07 LAB
COPATH REPORT: NORMAL
PAP: NORMAL

## 2021-10-10 ENCOUNTER — HEALTH MAINTENANCE LETTER (OUTPATIENT)
Age: 26
End: 2021-10-10

## 2021-12-03 DIAGNOSIS — Z30.09 BIRTH CONTROL COUNSELING: ICD-10-CM

## 2021-12-03 RX ORDER — NORETHINDRONE
KIT
Qty: 84 TABLET | Refills: 0 | Status: SHIPPED | OUTPATIENT
Start: 2021-12-03 | End: 2022-01-31

## 2021-12-03 NOTE — TELEPHONE ENCOUNTER
"Requested Prescriptions   Pending Prescriptions Disp Refills     NORLYDA 0.35 MG tablet [Pharmacy Med Name: NORLYDA 0.35MG TABLETS 28S] 84 tablet 3     Sig: TAKE 1 TABLET(0.35 MG) BY MOUTH DAILY       Contraceptives Protocol Passed - 12/3/2021  8:12 AM        Passed - Patient is not a current smoker if age is 35 or older        Passed - Recent (12 mo) or future (30 days) visit within the authorizing provider's specialty     Patient has had an office visit with the authorizing provider or a provider within the authorizing providers department within the previous 12 mos or has a future within next 30 days. See \"Patient Info\" tab in inbasket, or \"Choose Columns\" in Meds & Orders section of the refill encounter.              Passed - Medication is active on med list        Passed - No active pregnancy on record        Passed - No positive pregnancy test in past 12 months               Emily Cedeño RN on 12/3/2021 at 8:16 AM     "

## 2022-01-29 ENCOUNTER — HEALTH MAINTENANCE LETTER (OUTPATIENT)
Age: 27
End: 2022-01-29

## 2022-01-31 ENCOUNTER — OFFICE VISIT (OUTPATIENT)
Dept: OBGYN | Facility: OTHER | Age: 27
End: 2022-01-31
Attending: OBSTETRICS & GYNECOLOGY
Payer: COMMERCIAL

## 2022-01-31 VITALS
SYSTOLIC BLOOD PRESSURE: 118 MMHG | OXYGEN SATURATION: 100 % | BODY MASS INDEX: 25.75 KG/M2 | HEART RATE: 74 BPM | WEIGHT: 150 LBS | DIASTOLIC BLOOD PRESSURE: 74 MMHG

## 2022-01-31 DIAGNOSIS — N92.6 IRREGULAR MENSES: Primary | ICD-10-CM

## 2022-01-31 LAB
ANION GAP SERPL CALCULATED.3IONS-SCNC: 7 MMOL/L (ref 3–14)
B-HCG SERPL-ACNC: <1 IU/L
BUN SERPL-MCNC: 16 MG/DL (ref 7–25)
CALCIUM SERPL-MCNC: 10.1 MG/DL (ref 8.6–10.3)
CHLORIDE BLD-SCNC: 105 MMOL/L (ref 98–107)
CO2 SERPL-SCNC: 28 MMOL/L (ref 21–31)
CREAT SERPL-MCNC: 0.96 MG/DL (ref 0.6–1.2)
ERYTHROCYTE [DISTWIDTH] IN BLOOD BY AUTOMATED COUNT: 13.2 % (ref 10–15)
GFR SERPL CREATININE-BSD FRML MDRD: 84 ML/MIN/1.73M2
GLUCOSE BLD-MCNC: 90 MG/DL (ref 70–105)
HCT VFR BLD AUTO: 41.9 % (ref 35–47)
HGB BLD-MCNC: 13.6 G/DL (ref 11.7–15.7)
MCH RBC QN AUTO: 31.6 PG (ref 26.5–33)
MCHC RBC AUTO-ENTMCNC: 32.5 G/DL (ref 31.5–36.5)
MCV RBC AUTO: 97 FL (ref 78–100)
PLATELET # BLD AUTO: 187 10E3/UL (ref 150–450)
POTASSIUM BLD-SCNC: 3.8 MMOL/L (ref 3.5–5.1)
PROLACTIN SERPL-MCNC: 5 UG/L (ref 3–27)
RBC # BLD AUTO: 4.3 10E6/UL (ref 3.8–5.2)
SODIUM SERPL-SCNC: 140 MMOL/L (ref 134–144)
TSH SERPL DL<=0.005 MIU/L-ACNC: 3.86 MU/L (ref 0.4–4)
WBC # BLD AUTO: 6.2 10E3/UL (ref 4–11)

## 2022-01-31 PROCEDURE — 80048 BASIC METABOLIC PNL TOTAL CA: CPT | Mod: ZL | Performed by: OBSTETRICS & GYNECOLOGY

## 2022-01-31 PROCEDURE — 84443 ASSAY THYROID STIM HORMONE: CPT | Mod: ZL | Performed by: OBSTETRICS & GYNECOLOGY

## 2022-01-31 PROCEDURE — 85027 COMPLETE CBC AUTOMATED: CPT | Mod: ZL | Performed by: OBSTETRICS & GYNECOLOGY

## 2022-01-31 PROCEDURE — 99214 OFFICE O/P EST MOD 30 MIN: CPT | Performed by: OBSTETRICS & GYNECOLOGY

## 2022-01-31 PROCEDURE — 36415 COLL VENOUS BLD VENIPUNCTURE: CPT | Mod: ZL | Performed by: OBSTETRICS & GYNECOLOGY

## 2022-01-31 PROCEDURE — 84146 ASSAY OF PROLACTIN: CPT | Mod: ZL | Performed by: OBSTETRICS & GYNECOLOGY

## 2022-01-31 PROCEDURE — 84702 CHORIONIC GONADOTROPIN TEST: CPT | Mod: ZL | Performed by: OBSTETRICS & GYNECOLOGY

## 2022-01-31 RX ORDER — NORETHINDRONE ACETATE AND ETHINYL ESTRADIOL 1MG-20(21)
1 KIT ORAL DAILY
Qty: 84 TABLET | Refills: 3 | Status: SHIPPED | OUTPATIENT
Start: 2022-01-31 | End: 2022-12-15

## 2022-01-31 ASSESSMENT — PAIN SCALES - GENERAL: PAINLEVEL: NO PAIN (0)

## 2022-01-31 NOTE — PROGRESS NOTES
CC: heavy and irregular bleeding.  HPI:  Paula is a 26 year old female who presents for heavy and irregular bleeding.  She basically has had on again off again bleeding since her postpartum visit about a year ago.  Her pregnancy was notable for diabetes insipidus.  She also had some hypertension at the end of the pregnancy.  She denies history of migraine headaches with aura.  She had her 2 living children by  section.  The last delivery date was   Patient's last menstrual period was 2022 (exact date).  She is noticing occasional issues with hot flushing and heat intolerance.  She does have a history of hypothyroidism but currently off thyroid replacement.  She is not lactating.  She denies headaches or visual changes.  She does have fatigue but relates that to being a mother of 2 young children.  She is not convinced that she is done with childbearing it at this point.    OB History    Para Term  AB Living   3 2 2 0 1 2   SAB IAB Ectopic Multiple Live Births   0 0 0 0 2      # Outcome Date GA Lbr Femi/2nd Weight Sex Delivery Anes PTL Lv   3 Term 21 37w1d  3.189 kg (7 lb 0.5 oz) M CS-LTranv   LES      Name: THERESA ARGUETA      Apgar1: 8  Apgar5: 8   2 AB 19 18w1d   M          Birth Comments: Fetal Hydrops   1 Term 18   3.201 kg (7 lb 0.9 oz) M CS-LVertical Gen  LES      Complications: Fetal Intolerance, Failure to Progress in First Stage      Name: ERIC ARGUETA      Apgar1: 7  Apgar5: 8     Past Medical History:   Diagnosis Date     History of diabetes insipidus     in first pregnancy     History of diet controlled gestational diabetes mellitus (GDM)     first pregnancy     History of pre-eclampsia     first pregnancy     Past Surgical History:   Procedure Laterality Date      SECTION        SECTION N/A 2021    Procedure:  SECTION;  Surgeon: Seth Morfin MD;  Location:  OR     Social History     Socioeconomic History      Marital status:      Spouse name: Not on file     Number of children: Not on file     Years of education: Not on file     Highest education level: Not on file   Occupational History     Not on file   Tobacco Use     Smoking status: Never Smoker     Smokeless tobacco: Never Used   Substance and Sexual Activity     Alcohol use: Not Currently     Drug use: Never     Sexual activity: Yes     Partners: Male     Birth control/protection: None   Other Topics Concern     Not on file   Social History Narrative     Not on file     Social Determinants of Health     Financial Resource Strain: Not on file   Food Insecurity: Not on file   Transportation Needs: Not on file   Physical Activity: Not on file   Stress: Not on file   Social Connections: Not on file   Intimate Partner Violence: Not on file   Housing Stability: Not on file     History reviewed. No pertinent family history.    Current Outpatient Medications   Medication     EPINEPHrine (ANY BX GENERIC EQUIV) 0.3 MG/0.3ML injection 2-pack     hydrOXYzine (ATARAX) 25 MG tablet     norethindrone-ethinyl estradiol (JUNEL FE 1/20) 1-20 MG-MCG tablet     albuterol (PROVENTIL HFA) 108 (90 Base) MCG/ACT inhaler     No current facility-administered medications for this visit.     Allergies   Allergen Reactions     Bee Venom Anaphylaxis     Morphine Itching and Hives     Tolerates HYDROmorphone (DILUDID)  Tolerates HYDROmorphone (DILUDID)     Buspirone Other (See Comments)     Headache, migraine  migraines     Sertraline Muscle Pain (Myalgia)     /74   Pulse 74   Wt 68 kg (150 lb)   LMP 01/31/2022 (Exact Date)   SpO2 100%   BMI 25.75 kg/m      REVIEW OF SYSTEMS  Negative except as noted above    Exam:  Constitutional: healthy, alert, active and no distress      Lab: No results found for any visits on 01/31/22.    ASSESSMENT/PLAN :  1. Irregular menses      (N92.6) Irregular menses  (primary encounter diagnosis)  Comment:   Plan: CBC W PLT No Diff, Basic Metabolic  Panel, HCG         quantitative pregnancy, TSH Reflex GH,         Prolactin, norethindrone-ethinyl estradiol         (JUNEL FE 1/20) 1-20 MG-MCG tablet          And sent her to lab for blood count and basic metabolic panel, hCG, TSH, and prolactin.  Due to the fact that she wants to remain conservative with regards to maintaining her fertility will start her on an estrogen-containing oral contraceptive to try and regulate her cycles over the next 3 months.  If is not successful we will then proceed to ultrasound evaluation of her uterus.    Seth Morfin MD FACOG  1:55 PM 1/31/2022

## 2022-01-31 NOTE — NURSING NOTE
"Chief Complaint   Patient presents with     Consult     heavy periods since, going through 2 tampons per hour, inconsistant      Thyroid Problem     would like thyroid checked       Initial /74   Pulse 74   Wt 68 kg (150 lb)   LMP 01/31/2022 (Exact Date)   SpO2 100%   BMI 25.75 kg/m   Estimated body mass index is 25.75 kg/m  as calculated from the following:    Height as of 7/7/20: 1.626 m (5' 4\").    Weight as of this encounter: 68 kg (150 lb).  Medication Reconciliation: complete    FOOD SECURITY SCREENING QUESTIONS  Hunger Vital Signs:  Within the past 12 months we worried whether our food would run out before we got money to buy more. Never  Within the past 12 months the food we bought just didn't last and we didn't have money to get more. Never  Sangeeta Nova LPN 1/31/2022 1:40 PM             Sangeeta Nova LPN  "

## 2022-02-21 ENCOUNTER — APPOINTMENT (OUTPATIENT)
Dept: GENERAL RADIOLOGY | Facility: OTHER | Age: 27
End: 2022-02-21
Attending: STUDENT IN AN ORGANIZED HEALTH CARE EDUCATION/TRAINING PROGRAM
Payer: COMMERCIAL

## 2022-02-21 ENCOUNTER — HOSPITAL ENCOUNTER (EMERGENCY)
Facility: OTHER | Age: 27
Discharge: HOME | End: 2022-02-21
Attending: STUDENT IN AN ORGANIZED HEALTH CARE EDUCATION/TRAINING PROGRAM | Admitting: STUDENT IN AN ORGANIZED HEALTH CARE EDUCATION/TRAINING PROGRAM
Payer: COMMERCIAL

## 2022-02-21 VITALS
TEMPERATURE: 100.3 F | BODY MASS INDEX: 25.61 KG/M2 | HEART RATE: 118 BPM | SYSTOLIC BLOOD PRESSURE: 145 MMHG | RESPIRATION RATE: 18 BRPM | HEIGHT: 64 IN | WEIGHT: 150 LBS | DIASTOLIC BLOOD PRESSURE: 98 MMHG | OXYGEN SATURATION: 100 %

## 2022-02-21 DIAGNOSIS — N10 ACUTE PYELONEPHRITIS: ICD-10-CM

## 2022-02-21 LAB
ALBUMIN SERPL-MCNC: 5 G/DL (ref 3.5–5.7)
ALBUMIN UR-MCNC: 20 MG/DL
ALP SERPL-CCNC: 56 U/L (ref 34–104)
ALT SERPL W P-5'-P-CCNC: 9 U/L (ref 7–52)
ANION GAP SERPL CALCULATED.3IONS-SCNC: 13 MMOL/L (ref 3–14)
APPEARANCE UR: CLEAR
AST SERPL W P-5'-P-CCNC: 16 U/L (ref 13–39)
BACTERIA #/AREA URNS HPF: ABNORMAL /HPF
BASOPHILS # BLD AUTO: 0 10E3/UL (ref 0–0.2)
BASOPHILS NFR BLD AUTO: 0 %
BILIRUB DIRECT SERPL-MCNC: 0.2 MG/DL (ref 0–0.2)
BILIRUB SERPL-MCNC: 1.3 MG/DL (ref 0.3–1)
BILIRUB UR QL STRIP: NEGATIVE
BUN SERPL-MCNC: 12 MG/DL (ref 7–25)
CALCIUM SERPL-MCNC: 9.8 MG/DL (ref 8.6–10.3)
CHLORIDE BLD-SCNC: 101 MMOL/L (ref 98–107)
CO2 SERPL-SCNC: 22 MMOL/L (ref 21–31)
COLOR UR AUTO: ABNORMAL
CREAT SERPL-MCNC: 1.1 MG/DL (ref 0.6–1.2)
EOSINOPHIL # BLD AUTO: 0 10E3/UL (ref 0–0.7)
EOSINOPHIL NFR BLD AUTO: 0 %
ERYTHROCYTE [DISTWIDTH] IN BLOOD BY AUTOMATED COUNT: 13.2 % (ref 10–15)
GFR SERPL CREATININE-BSD FRML MDRD: 71 ML/MIN/1.73M2
GLUCOSE BLD-MCNC: 99 MG/DL (ref 70–105)
GLUCOSE UR STRIP-MCNC: NEGATIVE MG/DL
HCG UR QL: NEGATIVE
HCT VFR BLD AUTO: 41.7 % (ref 35–47)
HGB BLD-MCNC: 14.1 G/DL (ref 11.7–15.7)
HGB UR QL STRIP: ABNORMAL
IMM GRANULOCYTES # BLD: 0 10E3/UL
IMM GRANULOCYTES NFR BLD: 0 %
KETONES UR STRIP-MCNC: 40 MG/DL
LEUKOCYTE ESTERASE UR QL STRIP: ABNORMAL
LYMPHOCYTES # BLD AUTO: 0.7 10E3/UL (ref 0.8–5.3)
LYMPHOCYTES NFR BLD AUTO: 10 %
MCH RBC QN AUTO: 32.1 PG (ref 26.5–33)
MCHC RBC AUTO-ENTMCNC: 33.8 G/DL (ref 31.5–36.5)
MCV RBC AUTO: 95 FL (ref 78–100)
MONOCYTES # BLD AUTO: 0.5 10E3/UL (ref 0–1.3)
MONOCYTES NFR BLD AUTO: 6 %
MUCOUS THREADS #/AREA URNS LPF: PRESENT /LPF
NEUTROPHILS # BLD AUTO: 6.1 10E3/UL (ref 1.6–8.3)
NEUTROPHILS NFR BLD AUTO: 84 %
NITRATE UR QL: NEGATIVE
NRBC # BLD AUTO: 0 10E3/UL
NRBC BLD AUTO-RTO: 0 /100
PH UR STRIP: 5.5 [PH] (ref 5–9)
PLATELET # BLD AUTO: 169 10E3/UL (ref 150–450)
POTASSIUM BLD-SCNC: 3.6 MMOL/L (ref 3.5–5.1)
PROT SERPL-MCNC: 8 G/DL (ref 6.4–8.9)
RBC # BLD AUTO: 4.39 10E6/UL (ref 3.8–5.2)
RBC URINE: 4 /HPF
SODIUM SERPL-SCNC: 136 MMOL/L (ref 134–144)
SP GR UR STRIP: 1.01 (ref 1–1.03)
SQUAMOUS EPITHELIAL: 2 /HPF
UROBILINOGEN UR STRIP-MCNC: NORMAL MG/DL
WBC # BLD AUTO: 7.4 10E3/UL (ref 4–11)
WBC URINE: 55 /HPF

## 2022-02-21 PROCEDURE — 85014 HEMATOCRIT: CPT | Performed by: STUDENT IN AN ORGANIZED HEALTH CARE EDUCATION/TRAINING PROGRAM

## 2022-02-21 PROCEDURE — 81025 URINE PREGNANCY TEST: CPT | Performed by: STUDENT IN AN ORGANIZED HEALTH CARE EDUCATION/TRAINING PROGRAM

## 2022-02-21 PROCEDURE — 99283 EMERGENCY DEPT VISIT LOW MDM: CPT | Performed by: STUDENT IN AN ORGANIZED HEALTH CARE EDUCATION/TRAINING PROGRAM

## 2022-02-21 PROCEDURE — 99284 EMERGENCY DEPT VISIT MOD MDM: CPT | Mod: 25 | Performed by: STUDENT IN AN ORGANIZED HEALTH CARE EDUCATION/TRAINING PROGRAM

## 2022-02-21 PROCEDURE — 87186 SC STD MICRODIL/AGAR DIL: CPT | Performed by: STUDENT IN AN ORGANIZED HEALTH CARE EDUCATION/TRAINING PROGRAM

## 2022-02-21 PROCEDURE — 81001 URINALYSIS AUTO W/SCOPE: CPT | Performed by: STUDENT IN AN ORGANIZED HEALTH CARE EDUCATION/TRAINING PROGRAM

## 2022-02-21 PROCEDURE — 96365 THER/PROPH/DIAG IV INF INIT: CPT | Performed by: STUDENT IN AN ORGANIZED HEALTH CARE EDUCATION/TRAINING PROGRAM

## 2022-02-21 PROCEDURE — 82248 BILIRUBIN DIRECT: CPT | Performed by: STUDENT IN AN ORGANIZED HEALTH CARE EDUCATION/TRAINING PROGRAM

## 2022-02-21 PROCEDURE — 36415 COLL VENOUS BLD VENIPUNCTURE: CPT | Performed by: STUDENT IN AN ORGANIZED HEALTH CARE EDUCATION/TRAINING PROGRAM

## 2022-02-21 PROCEDURE — 74018 RADEX ABDOMEN 1 VIEW: CPT

## 2022-02-21 PROCEDURE — 250N000011 HC RX IP 250 OP 636: Performed by: STUDENT IN AN ORGANIZED HEALTH CARE EDUCATION/TRAINING PROGRAM

## 2022-02-21 RX ORDER — CEFTRIAXONE SODIUM 1 G
1 VIAL (EA) INJECTION ONCE
Status: DISCONTINUED | OUTPATIENT
Start: 2022-02-21 | End: 2022-02-21

## 2022-02-21 RX ORDER — CIPROFLOXACIN 250 MG/1
250 TABLET, FILM COATED ORAL 2 TIMES DAILY
Qty: 14 TABLET | Refills: 0 | Status: SHIPPED | OUTPATIENT
Start: 2022-02-21 | End: 2022-02-21

## 2022-02-21 RX ORDER — ONDANSETRON 4 MG/1
4 TABLET, ORALLY DISINTEGRATING ORAL EVERY 8 HOURS PRN
Qty: 5 TABLET | Refills: 0 | Status: SHIPPED | OUTPATIENT
Start: 2022-02-21 | End: 2022-12-15

## 2022-02-21 RX ORDER — CEFTRIAXONE SODIUM 1 G/50ML
1 INJECTION, SOLUTION INTRAVENOUS ONCE
Status: COMPLETED | OUTPATIENT
Start: 2022-02-21 | End: 2022-02-21

## 2022-02-21 RX ORDER — CIPROFLOXACIN 500 MG/1
500 TABLET, FILM COATED ORAL 2 TIMES DAILY
Qty: 20 TABLET | Refills: 0 | Status: SHIPPED | OUTPATIENT
Start: 2022-02-21 | End: 2022-12-15

## 2022-02-21 RX ADMIN — CEFTRIAXONE SODIUM 1 G: 1 INJECTION, SOLUTION INTRAVENOUS at 17:51

## 2022-02-21 NOTE — ED TRIAGE NOTES
ED Nursing Triage Note (General)   ________________________________    Paula Watson is a 27 year old Female that presents to triage private car  With history of  Chills Fever, right sided flank pain, vomiting, covid positive 1/25 reported by patient   Significant symptoms had onset 1 day(s) ago.  LMP 01/31/2022 (Exact Date) t  Patient appears alert , in mild distress., and cooperative behavior.    GCS Total = 15  Airway: intact  Breathing noted as Normal  Circulation Normal  Skin:  Normal  Action taken:  Triage to critical care immediately      PRE HOSPITAL PRIOR LIVING SITUATION Spouse and Children

## 2022-02-21 NOTE — ED PROVIDER NOTES
History     Chief Complaint   Patient presents with     Flank Pain     Fever       Paula Watson is a 27 year old year old female presenting with right flank pain.  Yesterday patient developed a low-grade fever which has escalated to T-max 103 Fahrenheit today.  Today she developed right flank pain with radiation into her groin along suprapubic discomfort and terminal dysuria.  She tried taking NSAIDs which she vomited up.  Denies antibiotic allergies or recent use of antibiotics. Denies other pain, dyspnea, diarrhea, vaginal discharge.  Denies abdominal surgical history beyond  1 year ago. Declines medication for discomfort or nausea.     Allergies   Allergen Reactions     Bee Venom Anaphylaxis     Morphine Itching and Hives     Tolerates HYDROmorphone (DILUDID)  Tolerates HYDROmorphone (DILUDID)     Buspirone Other (See Comments)     Headache, migraine  migraines     Sertraline Muscle Pain (Myalgia)       Patient Active Problem List    Diagnosis Date Noted     Anemia during pregnancy in third trimester 2021     Priority: Medium     Anemia due to blood loss, acute 2021     Priority: Medium     History of  2021     Priority: Medium     Added automatically from request for surgery [id number]       Encounter for triage in pregnant patient 10/25/2020     Priority: Medium       Past Medical History:   Diagnosis Date     History of diabetes insipidus      History of diet controlled gestational diabetes mellitus (GDM)      History of pre-eclampsia        Past Surgical History:   Procedure Laterality Date      SECTION        SECTION N/A 2021    Procedure:  SECTION;  Surgeon: Seth Morfin MD;  Location:  OR       No family history on file.    Social History     Tobacco Use     Smoking status: Never Smoker     Smokeless tobacco: Never Used   Substance Use Topics     Alcohol use: Not Currently     Drug use: Never       Medications:    ciprofloxacin (CIPRO)  "250 MG tablet  ciprofloxacin (CIPRO) 250 MG tablet  ondansetron (ZOFRAN-ODT) 4 MG ODT tab  albuterol (PROVENTIL HFA) 108 (90 Base) MCG/ACT inhaler  EPINEPHrine (ANY BX GENERIC EQUIV) 0.3 MG/0.3ML injection 2-pack  hydrOXYzine (ATARAX) 25 MG tablet  norethindrone-ethinyl estradiol (JUNEL FE 1/20) 1-20 MG-MCG tablet        Review of Systems: See HPI for pertinent negatives and positives. All other systems reviewed and found to be negative.    Physical Exam   BP (!) 145/98   Pulse 118   Temp 100.3  F (37.9  C) (Tympanic)   Resp 18   Ht 1.626 m (5' 4\")   Wt 68 kg (150 lb)   LMP 01/31/2022 (Exact Date)   SpO2 100%   BMI 25.75 kg/m       General: awake, nontoxic but mildly ill appearing  HEENT: atraumatic  Respiratory: normal effort, clear to auscultation bilaterally  Cardiovascular: regular rate and rhythm, no murmurs  Abdomen: soft, nondistended, tender in suprapubic area, no guarding or rebound  Extremities: no deformities, edema, or tenderness  : right CVA tenderness  Skin: increased warmth, dry, no rashes  Neuro: alert, no focal deficits    ED Course      ED Course as of 02/21/22 1821 Mon Feb 21, 2022   1734 Low suspicion for stone with suprapubic and flank pain and terminal dysuria, but will check KUB to ensure there is not large radiopaque stone.   1806 Patient prefers to manage at home if at all possible as she has a 1 year old.       Results for orders placed or performed during the hospital encounter of 02/21/22 (from the past 24 hour(s))   UA with Microscopic reflex to Culture    Specimen: Urine, Clean Catch   Result Value Ref Range    Color Urine Light Yellow Colorless, Straw, Light Yellow, Yellow    Appearance Urine Clear Clear    Glucose Urine Negative Negative mg/dL    Bilirubin Urine Negative Negative    Ketones Urine 40  (A) Negative mg/dL    Specific Gravity Urine 1.015 1.000 - 1.030    Blood Urine Trace (A) Negative    pH Urine 5.5 5.0 - 9.0    Protein Albumin Urine 20  (A) Negative mg/dL    " Urobilinogen Urine Normal Normal, 2.0 mg/dL    Nitrite Urine Negative Negative    Leukocyte Esterase Urine Moderate (A) Negative    Bacteria Urine Few (A) None Seen /HPF    Mucus Urine Present (A) None Seen /LPF    RBC Urine 4 (H) <=2 /HPF    WBC Urine 55 (H) <=5 /HPF    Squamous Epithelials Urine 2 (H) <=1 /HPF    Narrative    Urine Culture ordered based on laboratory criteria   HCG qualitative urine   Result Value Ref Range    hCG Urine Qualitative Negative Negative   CBC with platelets differential    Narrative    The following orders were created for panel order CBC with platelets differential.  Procedure                               Abnormality         Status                     ---------                               -----------         ------                     CBC with platelets and d...[[id number]]  Abnormal            Final result                 Please view results for these tests on the individual orders.   Basic metabolic panel   Result Value Ref Range    Sodium 136 134 - 144 mmol/L    Potassium 3.6 3.5 - 5.1 mmol/L    Chloride 101 98 - 107 mmol/L    Carbon Dioxide (CO2) 22 21 - 31 mmol/L    Anion Gap 13 3 - 14 mmol/L    Urea Nitrogen 12 7 - 25 mg/dL    Creatinine 1.10 0.60 - 1.20 mg/dL    Calcium 9.8 8.6 - 10.3 mg/dL    Glucose 99 70 - 105 mg/dL    GFR Estimate 71 >60 mL/min/1.73m2   CBC with platelets and differential   Result Value Ref Range    WBC Count 7.4 4.0 - 11.0 10e3/uL    RBC Count 4.39 3.80 - 5.20 10e6/uL    Hemoglobin 14.1 11.7 - 15.7 g/dL    Hematocrit 41.7 35.0 - 47.0 %    MCV 95 78 - 100 fL    MCH 32.1 26.5 - 33.0 pg    MCHC 33.8 31.5 - 36.5 g/dL    RDW 13.2 10.0 - 15.0 %    Platelet Count 169 150 - 450 10e3/uL    % Neutrophils 84 %    % Lymphocytes 10 %    % Monocytes 6 %    % Eosinophils 0 %    % Basophils 0 %    % Immature Granulocytes 0 %    NRBCs per 100 WBC 0 <1 /100    Absolute Neutrophils 6.1 1.6 - 8.3 10e3/uL    Absolute Lymphocytes 0.7 (L) 0.8 - 5.3 10e3/uL    Absolute  Monocytes 0.5 0.0 - 1.3 10e3/uL    Absolute Eosinophils 0.0 0.0 - 0.7 10e3/uL    Absolute Basophils 0.0 0.0 - 0.2 10e3/uL    Absolute Immature Granulocytes 0.0 <=0.4 10e3/uL    Absolute NRBCs 0.0 10e3/uL   Hepatic panel   Result Value Ref Range    Bilirubin Total 1.3 (H) 0.3 - 1.0 mg/dL    Bilirubin Direct 0.2 0.0 - 0.2 mg/dL    Protein Total 8.0 6.4 - 8.9 g/dL    Albumin 5.0 3.5 - 5.7 g/dL    Alkaline Phosphatase 56 34 - 104 U/L    AST 16 13 - 39 U/L    ALT 9 7 - 52 U/L   XR Abdomen Port 1 View    Narrative    XR ABDOMEN PORT 1 VIEWS, 2/21/2022 5:45 PM    History: Female, age 27 years; Eval for stone    Comparison: None.    Technique: Single view .    FINDINGS: Bowel gas pattern is unremarkable. Small calcifications in  the lower pelvis likely represent phleboliths. Surgical staples in the  right hemiabdomen and in the pelvis.      Impression    IMPRESSION:   Postoperative changes. No evidence of obstruction.    Small calcifications in the lower pelvis likely representing  phleboliths.    Cannot completely exclude the possibility of renal, ureteral or  bladder calculus.    LEI HERNANDEZ MD         SYSTEM ID:  [id number]       Medications   cefTRIAXone in d5w (ROCEPHIN) intermittent infusion 1 g (1 g Intravenous New Bag 2/21/22 5618)       Assessments & Plan (with Medical Decision Making)     I have reviewed the nursing notes.    27 year old female evaluated for fever, right flank pain, nausea/vomiting, and terminal dysuria. Borderline fever 100.3 Fahrenheit here. Exam with nontoxic but mildly appearing female with right CVA and suprapubic tenderness. Evaluation remarkable for moderate leukocyte esterase and trace blood. No obvious stones on abdominal x-ray. Presentation most consistent with right pyelonephritis. She was given IV antibiotic dose here and was able to tolerate p.o. fluid intake. Patient desires to discharge home with 1-year-old child. Without significantly high fever here and able to tolerate fluids,  outpatient trial seems appropriate. Ciprofloxacin 500 mg twice daily x7 days and Zofran Instymeds prescriptions provided. Attached instructions on diagnosis including ED return precautions provided. Recommend PCP follow-up later this week. Plan reviewed with patient who acknowledges understanding and all concerns/questions answered. Discharged home in stable improved condition.    I have reviewed the findings, diagnosis, plan and need for follow up with the patient.    Patient instructions:  Please complete ciprofloxacin 500 mg (2 tabs) twice daily (once in morning and once in evening) for 7 days. Use zofran to help with any nausea. Your symptoms including fever should improve after 48-72 hours of antibiotics. Follow up with your PCP this Thursday or Friday (via phone or in-person). Please review attached instructions including reasons to return to the emergency department/call 911/seek medical care.    New Prescriptions    CIPROFLOXACIN (CIPRO) 250 MG TABLET    Take 1 tablet (250 mg) by mouth 2 times daily    CIPROFLOXACIN (CIPRO) 250 MG TABLET    Take 1 tablet (250 mg) by mouth 2 times daily    ONDANSETRON (ZOFRAN-ODT) 4 MG ODT TAB    Take 1 tablet (4 mg) by mouth every 8 hours as needed for nausea       Final diagnoses:   Acute pyelonephritis - right       2/21/2022   [facility]       Larry Luo MD  02/21/22 [id number]

## 2022-02-22 ENCOUNTER — APPOINTMENT (OUTPATIENT)
Dept: CT IMAGING | Facility: OTHER | Age: 27
End: 2022-02-22
Attending: EMERGENCY MEDICINE
Payer: COMMERCIAL

## 2022-02-22 ENCOUNTER — HOSPITAL ENCOUNTER (EMERGENCY)
Facility: OTHER | Age: 27
Discharge: HOME | End: 2022-02-22
Attending: EMERGENCY MEDICINE | Admitting: EMERGENCY MEDICINE
Payer: COMMERCIAL

## 2022-02-22 VITALS
HEIGHT: 64 IN | DIASTOLIC BLOOD PRESSURE: 80 MMHG | BODY MASS INDEX: 25.61 KG/M2 | HEART RATE: 68 BPM | RESPIRATION RATE: 20 BRPM | TEMPERATURE: 97.6 F | WEIGHT: 150 LBS | OXYGEN SATURATION: 98 % | SYSTOLIC BLOOD PRESSURE: 121 MMHG

## 2022-02-22 DIAGNOSIS — N10 PYELONEPHRITIS, ACUTE: ICD-10-CM

## 2022-02-22 LAB
ANION GAP SERPL CALCULATED.3IONS-SCNC: 9 MMOL/L (ref 3–14)
BASOPHILS # BLD AUTO: 0 10E3/UL (ref 0–0.2)
BASOPHILS NFR BLD AUTO: 0 %
BUN SERPL-MCNC: 11 MG/DL (ref 7–25)
CALCIUM SERPL-MCNC: 9.4 MG/DL (ref 8.6–10.3)
CHLORIDE BLD-SCNC: 100 MMOL/L (ref 98–107)
CO2 SERPL-SCNC: 25 MMOL/L (ref 21–31)
CREAT SERPL-MCNC: 1.08 MG/DL (ref 0.6–1.2)
EOSINOPHIL # BLD AUTO: 0 10E3/UL (ref 0–0.7)
EOSINOPHIL NFR BLD AUTO: 0 %
ERYTHROCYTE [DISTWIDTH] IN BLOOD BY AUTOMATED COUNT: 13 % (ref 10–15)
GFR SERPL CREATININE-BSD FRML MDRD: 72 ML/MIN/1.73M2
GLUCOSE BLD-MCNC: 86 MG/DL (ref 70–105)
HCT VFR BLD AUTO: 39.2 % (ref 35–47)
HGB BLD-MCNC: 12.8 G/DL (ref 11.7–15.7)
HOLD SPECIMEN: NORMAL
HOLD SPECIMEN: NORMAL
IMM GRANULOCYTES # BLD: 0 10E3/UL
IMM GRANULOCYTES NFR BLD: 0 %
LYMPHOCYTES # BLD AUTO: 1 10E3/UL (ref 0.8–5.3)
LYMPHOCYTES NFR BLD AUTO: 15 %
MCH RBC QN AUTO: 31.2 PG (ref 26.5–33)
MCHC RBC AUTO-ENTMCNC: 32.7 G/DL (ref 31.5–36.5)
MCV RBC AUTO: 96 FL (ref 78–100)
MONOCYTES # BLD AUTO: 0.4 10E3/UL (ref 0–1.3)
MONOCYTES NFR BLD AUTO: 7 %
NEUTROPHILS # BLD AUTO: 4.9 10E3/UL (ref 1.6–8.3)
NEUTROPHILS NFR BLD AUTO: 78 %
NRBC # BLD AUTO: 0 10E3/UL
NRBC BLD AUTO-RTO: 0 /100
PLATELET # BLD AUTO: 162 10E3/UL (ref 150–450)
POTASSIUM BLD-SCNC: 3.6 MMOL/L (ref 3.5–5.1)
RBC # BLD AUTO: 4.1 10E6/UL (ref 3.8–5.2)
SODIUM SERPL-SCNC: 134 MMOL/L (ref 134–144)
WBC # BLD AUTO: 6.4 10E3/UL (ref 4–11)

## 2022-02-22 PROCEDURE — 82310 ASSAY OF CALCIUM: CPT | Performed by: EMERGENCY MEDICINE

## 2022-02-22 PROCEDURE — 250N000011 HC RX IP 250 OP 636: Performed by: EMERGENCY MEDICINE

## 2022-02-22 PROCEDURE — 99283 EMERGENCY DEPT VISIT LOW MDM: CPT | Performed by: EMERGENCY MEDICINE

## 2022-02-22 PROCEDURE — 96374 THER/PROPH/DIAG INJ IV PUSH: CPT | Performed by: EMERGENCY MEDICINE

## 2022-02-22 PROCEDURE — 85025 COMPLETE CBC W/AUTO DIFF WBC: CPT | Performed by: EMERGENCY MEDICINE

## 2022-02-22 PROCEDURE — 96365 THER/PROPH/DIAG IV INF INIT: CPT | Performed by: EMERGENCY MEDICINE

## 2022-02-22 PROCEDURE — 36415 COLL VENOUS BLD VENIPUNCTURE: CPT | Performed by: EMERGENCY MEDICINE

## 2022-02-22 PROCEDURE — 96375 TX/PRO/DX INJ NEW DRUG ADDON: CPT | Performed by: EMERGENCY MEDICINE

## 2022-02-22 PROCEDURE — 99284 EMERGENCY DEPT VISIT MOD MDM: CPT | Mod: 25 | Performed by: EMERGENCY MEDICINE

## 2022-02-22 PROCEDURE — 96361 HYDRATE IV INFUSION ADD-ON: CPT | Performed by: EMERGENCY MEDICINE

## 2022-02-22 PROCEDURE — 258N000003 HC RX IP 258 OP 636: Performed by: EMERGENCY MEDICINE

## 2022-02-22 PROCEDURE — 250N000013 HC RX MED GY IP 250 OP 250 PS 637: Performed by: EMERGENCY MEDICINE

## 2022-02-22 PROCEDURE — 74176 CT ABD & PELVIS W/O CONTRAST: CPT

## 2022-02-22 RX ORDER — SODIUM CHLORIDE 9 MG/ML
INJECTION, SOLUTION INTRAVENOUS CONTINUOUS
Status: DISCONTINUED | OUTPATIENT
Start: 2022-02-22 | End: 2022-02-22 | Stop reason: HOSPADM

## 2022-02-22 RX ORDER — CEFTRIAXONE SODIUM 1 G/50ML
1 INJECTION, SOLUTION INTRAVENOUS ONCE
Status: COMPLETED | OUTPATIENT
Start: 2022-02-22 | End: 2022-02-22

## 2022-02-22 RX ORDER — HYDROCODONE BITARTRATE AND ACETAMINOPHEN 5; 325 MG/1; MG/1
1 TABLET ORAL ONCE
Status: COMPLETED | OUTPATIENT
Start: 2022-02-22 | End: 2022-02-22

## 2022-02-22 RX ORDER — ONDANSETRON 2 MG/ML
4 INJECTION INTRAMUSCULAR; INTRAVENOUS EVERY 30 MIN PRN
Status: DISCONTINUED | OUTPATIENT
Start: 2022-02-22 | End: 2022-02-22 | Stop reason: HOSPADM

## 2022-02-22 RX ORDER — KETOROLAC TROMETHAMINE 30 MG/ML
30 INJECTION, SOLUTION INTRAMUSCULAR; INTRAVENOUS ONCE
Status: COMPLETED | OUTPATIENT
Start: 2022-02-22 | End: 2022-02-22

## 2022-02-22 RX ORDER — FENTANYL CITRATE 50 UG/ML
50 INJECTION, SOLUTION INTRAMUSCULAR; INTRAVENOUS ONCE
Status: COMPLETED | OUTPATIENT
Start: 2022-02-22 | End: 2022-02-22

## 2022-02-22 RX ORDER — HYDROCODONE BITARTRATE AND ACETAMINOPHEN 5; 325 MG/1; MG/1
1 TABLET ORAL EVERY 6 HOURS PRN
Qty: 10 TABLET | Refills: 0 | Status: SHIPPED | OUTPATIENT
Start: 2022-02-22 | End: 2022-12-15

## 2022-02-22 RX ORDER — ONDANSETRON 4 MG/1
4 TABLET, ORALLY DISINTEGRATING ORAL EVERY 8 HOURS PRN
Qty: 10 TABLET | Refills: 0 | Status: SHIPPED | OUTPATIENT
Start: 2022-02-22 | End: 2022-02-25

## 2022-02-22 RX ADMIN — SODIUM CHLORIDE 1000 ML: 9 INJECTION, SOLUTION INTRAVENOUS at 09:45

## 2022-02-22 RX ADMIN — ONDANSETRON 4 MG: 2 INJECTION INTRAMUSCULAR; INTRAVENOUS at 09:42

## 2022-02-22 RX ADMIN — CEFTRIAXONE SODIUM 1 G: 1 INJECTION, SOLUTION INTRAVENOUS at 09:43

## 2022-02-22 RX ADMIN — FENTANYL CITRATE 50 MCG: 0.05 INJECTION, SOLUTION INTRAMUSCULAR; INTRAVENOUS at 10:15

## 2022-02-22 RX ADMIN — KETOROLAC TROMETHAMINE 30 MG: 30 INJECTION, SOLUTION INTRAMUSCULAR; INTRAVENOUS at 09:41

## 2022-02-22 RX ADMIN — HYDROCODONE BITARTRATE AND ACETAMINOPHEN 1 TABLET: 5; 325 TABLET ORAL at 11:15

## 2022-02-22 ASSESSMENT — ENCOUNTER SYMPTOMS
AGITATION: 0
ARTHRALGIAS: 0
NAUSEA: 1
CHEST TIGHTNESS: 0
SHORTNESS OF BREATH: 0
FLANK PAIN: 1
LIGHT-HEADEDNESS: 0
FEVER: 1
CHILLS: 1
ABDOMINAL PAIN: 0
VOMITING: 1

## 2022-02-22 NOTE — ED NOTES
"Pt sitting in chair rocking back and forth with pain. States fentanyl made her \"dizzy and nauseated\". Did \"not change her pain\". Will inform Dr Reilly.  "

## 2022-02-22 NOTE — ED NOTES
Per patient the fentanyl just made her dizzy and nauseated.  It did not change her pain level at all.  Provider notified.    Shanae Ram RN on 2/22/2022 at 10:17 AM

## 2022-02-22 NOTE — ED PROVIDER NOTES
History     Chief Complaint   Patient presents with     Flank Pain     HPI  Paula Hagan is a 27 year old female who comes in for flank pain nausea vomiting.  She was seen here yesterday, diagnosed with pyelonephritis, and that note is reviewed by myself.  She states that this morning she took Zofran to try to help with her nausea so that she could take her antibiotic.  Unfortunately Zofran did not work and she threw that up right away after taking it.  She states she has not been able to keep anything down since she left yesterday.  She is still febrile.  Very nauseous.  She calls the pain is 6 out of 10, starts in the right kidney and radiates into her groin.    Allergies:  Allergies   Allergen Reactions     Bee Venom Anaphylaxis     Morphine Itching and Hives     Tolerates HYDROmorphone (DILUDID)  Tolerates HYDROmorphone (DILUDID)     Buspirone Other (See Comments)     Headache, migraine  migraines     Sertraline Muscle Pain (Myalgia)       Problem List:    Patient Active Problem List    Diagnosis Date Noted     Anemia during pregnancy in third trimester 2021     Priority: Medium     Anemia due to blood loss, acute 2021     Priority: Medium     History of  2021     Priority: Medium     Added automatically from request for surgery [id number]       Encounter for triage in pregnant patient 10/25/2020     Priority: Medium        Past Medical History:    Past Medical History:   Diagnosis Date     History of diabetes insipidus      History of diet controlled gestational diabetes mellitus (GDM)      History of pre-eclampsia        Past Surgical History:    Past Surgical History:   Procedure Laterality Date      SECTION        SECTION N/A 2021    Procedure:  SECTION;  Surgeon: Seth Morfin MD;  Location:  OR       Family History:    No family history on file.    Social History:  Marital Status:   [2]  Social History     Tobacco Use     Smoking status:  "Never Smoker     Smokeless tobacco: Never Used   Substance Use Topics     Alcohol use: Not Currently     Drug use: Never        Medications:    albuterol (PROVENTIL HFA) 108 (90 Base) MCG/ACT inhaler  ciprofloxacin (CIPRO) 500 MG tablet  EPINEPHrine (ANY BX GENERIC EQUIV) 0.3 MG/0.3ML injection 2-pack  HYDROcodone-acetaminophen (NORCO) 5-325 MG tablet  hydrOXYzine (ATARAX) 25 MG tablet  norethindrone-ethinyl estradiol (JUNEL FE 1/20) 1-20 MG-MCG tablet  ondansetron (ZOFRAN ODT) 4 MG ODT tab  ondansetron (ZOFRAN-ODT) 4 MG ODT tab          Review of Systems   Constitutional: Positive for chills and fever.   HENT: Negative for congestion.    Eyes: Negative for visual disturbance.   Respiratory: Negative for chest tightness and shortness of breath.    Cardiovascular: Negative for chest pain.   Gastrointestinal: Positive for nausea and vomiting. Negative for abdominal pain.   Genitourinary: Positive for flank pain.   Musculoskeletal: Negative for arthralgias.   Skin: Negative for rash.   Neurological: Negative for light-headedness.   Psychiatric/Behavioral: Negative for agitation.       Physical Exam   BP: (!) 134/100  Pulse: 120  Temp: 100.3  F (37.9  C)  Resp: 20  Height: 162.6 cm (5' 4\")  Weight: 68 kg (150 lb)  SpO2: 98 %      Physical Exam  Vitals and nursing note reviewed.   Constitutional:       Appearance: Normal appearance.   HENT:      Head: Normocephalic and atraumatic.      Mouth/Throat:      Mouth: Mucous membranes are moist.   Eyes:      Conjunctiva/sclera: Conjunctivae normal.   Cardiovascular:      Rate and Rhythm: Normal rate and regular rhythm.      Heart sounds: Normal heart sounds.   Pulmonary:      Effort: Pulmonary effort is normal.      Breath sounds: Normal breath sounds.   Abdominal:      General: Abdomen is flat. Bowel sounds are normal. There is no distension.      Tenderness: There is right CVA tenderness.   Skin:     General: Skin is warm and dry.   Neurological:      Mental Status: She is " alert and oriented to person, place, and time.   Psychiatric:         Mood and Affect: Mood normal.         Behavior: Behavior normal.         ED Course              ED Course as of 02/22/22 1210   Tue Feb 22, 2022   0935 Given her ongoing symptoms, and pain radiating from her right CVA to the groin, and she also states that this was the having children, I am going to do CT scan to further evaluate for possible stone.     Procedures                Results for orders placed or performed during the hospital encounter of 02/22/22 (from the past 24 hour(s))   Extra Tube *Canceled*    Narrative    The following orders were created for panel order Extra Tube.  Procedure                               Abnormality         Status                     ---------                               -----------         ------                     Extra Purple Top Tube[[id number]]                                                         Please view results for these tests on the individual orders.   CBC with platelets differential    Narrative    The following orders were created for panel order CBC with platelets differential.  Procedure                               Abnormality         Status                     ---------                               -----------         ------                     CBC with platelets and d...[[id number]]                      Final result                 Please view results for these tests on the individual orders.   Basic metabolic panel   Result Value Ref Range    Sodium 134 134 - 144 mmol/L    Potassium 3.6 3.5 - 5.1 mmol/L    Chloride 100 98 - 107 mmol/L    Carbon Dioxide (CO2) 25 21 - 31 mmol/L    Anion Gap 9 3 - 14 mmol/L    Urea Nitrogen 11 7 - 25 mg/dL    Creatinine 1.08 0.60 - 1.20 mg/dL    Calcium 9.4 8.6 - 10.3 mg/dL    Glucose 86 70 - 105 mg/dL    GFR Estimate 72 >60 mL/min/1.73m2   Extra Tube    Narrative    The following orders were created for panel order Extra Tube.  Procedure                                Abnormality         Status                     ---------                               -----------         ------                     Extra Serum Separator Tu...[[id number]]                      Final result                 Please view results for these tests on the individual orders.   Extra Serum Separator Tube (SST)   Result Value Ref Range    Hold Specimen JIC    Extra Tube    Narrative    The following orders were created for panel order Extra Tube.  Procedure                               Abnormality         Status                     ---------                               -----------         ------                     Extra Blue Top Tube[[id number]]                              Final result                 Please view results for these tests on the individual orders.   Extra Blue Top Tube   Result Value Ref Range    Hold Specimen JIC    CBC with platelets and differential   Result Value Ref Range    WBC Count 6.4 4.0 - 11.0 10e3/uL    RBC Count 4.10 3.80 - 5.20 10e6/uL    Hemoglobin 12.8 11.7 - 15.7 g/dL    Hematocrit 39.2 35.0 - 47.0 %    MCV 96 78 - 100 fL    MCH 31.2 26.5 - 33.0 pg    MCHC 32.7 31.5 - 36.5 g/dL    RDW 13.0 10.0 - 15.0 %    Platelet Count 162 150 - 450 10e3/uL    % Neutrophils 78 %    % Lymphocytes 15 %    % Monocytes 7 %    % Eosinophils 0 %    % Basophils 0 %    % Immature Granulocytes 0 %    NRBCs per 100 WBC 0 <1 /100    Absolute Neutrophils 4.9 1.6 - 8.3 10e3/uL    Absolute Lymphocytes 1.0 0.8 - 5.3 10e3/uL    Absolute Monocytes 0.4 0.0 - 1.3 10e3/uL    Absolute Eosinophils 0.0 0.0 - 0.7 10e3/uL    Absolute Basophils 0.0 0.0 - 0.2 10e3/uL    Absolute Immature Granulocytes 0.0 <=0.4 10e3/uL    Absolute NRBCs 0.0 10e3/uL   CT Abdomen Pelvis w/o Contrast    Narrative    Exam: CT ABDOMEN PELVIS W/O CONTRAST    Exam reason: Flank pain, kidney stone suspected    Technique: Using helical CT technique, axial images of the abdomen and  pelvis  were acquired without administration  of IV contrast. Coronal  and sagittal reformats were performed.    Comparison: Abdominal radiograph on 2/21/2022.    FINDINGS:    NOTE: Noncontrast images are insensitive for detection of solid organ  abnormalities and some other types of pathology.    ABDOMEN:     Liver:  There are couple of subcentimeter hypodensities which are too  small to further characterize but are likely benign cysts or  hemangiomas.    Gallbladder:  There is layering dense material in the gallbladder,  compatible with sludge.  Bile Ducts:  No significantly dilated ducts.  Spleen:  Normal size without focal abnormality.  Kidneys:  There is a small hyperdense cyst in the lower right kidney,  likely a small hemorrhagic cyst. There is a low-density cyst in the  lower pole of the left kidney. No hydronephrosis or renal calculi.  Adrenals:  No nodules.  Pancreas:  No mass or peripancreatic fat stranding.   Lymph Nodes:   No significant adenopathy.   Vascular:  No aortic aneurysm.   Abdominal wall:   Small fat-containing umbilical hernia.    Pelvis: No mass or adenopathy.    Bowel/Mesentery/Peritoneum:   -No evidence of bowel obstruction.   -No acute inflammatory findings.   -No ascites.    Visualized portion of the Chest: No infiltrates and no nodules.     Musculoskeletal: No acute findings.        Impression    IMPRESSION:    No hydronephrosis or obstructing calculi. No other acute findings in  the abdomen and pelvis.    JOCELYN CARTER MD         SYSTEM ID:  [id number]       Medications   0.9% sodium chloride BOLUS (1,000 mLs Intravenous New Bag 2/22/22 0945)     Followed by   sodium chloride 0.9% infusion (has no administration in time range)   ondansetron (ZOFRAN) injection 4 mg (4 mg Intravenous Given 2/22/22 0942)   cefTRIAXone in d5w (ROCEPHIN) intermittent infusion 1 g (0 g Intravenous Stopped 2/22/22 1015)   ketorolac (TORADOL) injection 30 mg (30 mg Intravenous Given 2/22/22 0941)   fentaNYL (PF) (SUBLIMAZE) injection 50 mcg (50 mcg  Intravenous Given 2/22/22 1015)   HYDROcodone-acetaminophen (NORCO) 5-325 MG per tablet 1 tablet (1 tablet Oral Given 2/22/22 1115)       Assessments & Plan (with Medical Decision Making)     I have reviewed the nursing notes.    I have reviewed the findings, diagnosis, plan and need for follow up with the patient.  Due to her worsening symptoms, CT scan was obtained to make absolutely certain there was no ureteral lithiasis.  This was reassuring, scan essentially normal.  Labs also reassuring.  She is feeling better with above interventions.  We will discharge her to home with prescription for some Zofran ODT, hopefully she can get this in mind it will work better.  Also a few hydrocodone for pain.  If she is unable to get her antibiotics and and not keeping anything down, return.    New Prescriptions    HYDROCODONE-ACETAMINOPHEN (NORCO) 5-325 MG TABLET    Take 1 tablet by mouth every 6 hours as needed for pain    ONDANSETRON (ZOFRAN ODT) 4 MG ODT TAB    Take 1 tablet (4 mg) by mouth every 8 hours as needed for nausea       Final diagnoses:   Pyelonephritis, acute       2/22/2022   [facility]     Tin Reilly MD  02/22/22 1211

## 2022-02-22 NOTE — DISCHARGE INSTRUCTIONS
Please complete ciprofloxacin 500 mg (2 tabs) twice daily (once in morning and once in evening) for 7 days. Use zofran to help with any nausea. Your symptoms including fever should improve after 48-72 hours of antibiotics. Follow up with your PCP this Thursday or Friday (via phone or in-person). Please review attached instructions including reasons to return to the emergency department/call 911/seek medical care.

## 2022-02-22 NOTE — ED TRIAGE NOTES
Arrives to ED with C/O right flank pain. Diagnosed with kidney infection yesterday. Vomiting since last evening. Unable to take antibiotic

## 2022-02-22 NOTE — PROGRESS NOTES
"Patient states that she is feeling better at this time.  She was able to eat a cracker.  Still have pain but states that it is \"tolerable\".  Patient states that she is ready to discharge.  Provider notified.   Shanae Ram RN on 2/22/2022 at 12:12 PM    "

## 2022-02-23 ENCOUNTER — TELEPHONE (OUTPATIENT)
Dept: EMERGENCY MEDICINE | Facility: OTHER | Age: 27
End: 2022-02-23
Payer: COMMERCIAL

## 2022-02-23 LAB — BACTERIA UR CULT: ABNORMAL

## 2022-02-23 RX ORDER — CEFPODOXIME PROXETIL 200 MG/1
200 TABLET, FILM COATED ORAL 2 TIMES DAILY
Qty: 18 TABLET | Refills: 0 | Status: SHIPPED | OUTPATIENT
Start: 2022-02-23 | End: 2022-03-04

## 2022-02-23 NOTE — TELEPHONE ENCOUNTER
[facility] Sunday () Emergency Department Lab result notification [Adult-Female]    [place] ED lab result protocol used  Urine Culture    Reason for call  Notify of lab results, assess symptoms,  review ED providers recommendations/discharge instructions (if necessary) and advise per ED lab result f/u protocol    Lab Result (including Rx patient on, if applicable)  Final Urine Culture Report on 22  [facility] Emergency Dept discharge antibiotic prescribed: Ciprofloxacin (Cipro) 500 mg tablet, 1 tablet (500 mg) by mouth 2 times daily for 10 days.  #1. Bacteria, 10,000-50,000 CFU/mL Escherichia coli,  is [INTERMEDIATE SUSCEPTIBLE] to antibiotic.   Recommendations in treatment per [facility] ED lab result Urine Culture protocol.     Information table from Emergency Dept Provider visit on 22  Symptoms reported at ED visit (Chief complaint, HPI) Chief Complaint   Patient presents with     Flank Pain     Fever         Paula Watson is a 27 year old year old female presenting with right flank pain.  Yesterday patient developed a low-grade fever which has escalated to T-max 103 Fahrenheit today.  Today she developed right flank pain with radiation into her groin along suprapubic discomfort and terminal dysuria.  She tried taking NSAIDs which she vomited up.  Denies antibiotic allergies or recent use of antibiotics. Denies other pain, dyspnea, diarrhea, vaginal discharge.  Denies abdominal surgical history beyond  1 year ago. Declines medication for discomfort or nausea.     Significant Medical hx, if applicable (i.e. CKD, diabetes) Anemia   Allergies Allergies   Allergen Reactions     Bee Venom Anaphylaxis     Morphine Itching and Hives     Tolerates HYDROmorphone (DILUDID)  Tolerates HYDROmorphone (DILUDID)     Buspirone Other (See Comments)     Headache, migraine  migraines     Sertraline Muscle Pain (Myalgia)      Weight, if applicable Wt Readings from Last 2 Encounters:    02/22/22 68 kg (150 lb)   02/21/22 68 kg (150 lb)      Coumadin/Warfarin [Yes /No] NO   Creatinine Level (mg/dl) Creatinine   Date Value Ref Range Status   02/22/2022 1.08 0.60 - 1.20 mg/dL Final   02/18/2021 0.65 0.60 - 1.20 mg/dL Final      Creatinine clearance (ml/min), if applicable Serum creatinine: 1.08 mg/dL 02/22/22 0946  Estimated creatinine clearance: 74.8 mL/min   Pregnant (Yes/No/NA) NEGATIVE PER HCG QUAL 2/21/22   Breastfeeding (Yes/No/NA) NO   ED providers Impression and Plan (applicable information) 27 year old female evaluated for fever, right flank pain, nausea/vomiting, and terminal dysuria. Borderline fever 100.3 Fahrenheit here. Exam with nontoxic but mildly appearing female with right CVA and suprapubic tenderness. Evaluation remarkable for moderate leukocyte esterase and trace blood. No obvious stones on abdominal x-ray. Presentation most consistent with right pyelonephritis. She was given IV antibiotic dose here and was able to tolerate p.o. fluid intake. Patient desires to discharge home with 1-year-old child. Without significantly high fever here and able to tolerate fluids, outpatient trial seems appropriate. Ciprofloxacin 500 mg twice daily x7 days and Zofran Instymeds prescriptions provided. Attached instructions on diagnosis including ED return precautions provided. Recommend PCP follow-up later this week. Plan reviewed with patient who acknowledges understanding and all concerns/questions answered. Discharged home in stable improved condition.     I have reviewed the findings, diagnosis, plan and need for follow up with the patient.     Patient instructions:  Please complete ciprofloxacin 500 mg (2 tabs) twice daily (once in morning and once in evening) for 7 days. Use zofran to help with any nausea. Your symptoms including fever should improve after 48-72 hours of antibiotics. Follow up with your PCP this Thursday or Friday (via phone or in-person). Please review attached  "instructions including reasons to return to the emergency department/call 911/seek medical care.   ED diagnosis Acute pyelonephritis   ED provider  Larry Luo MD      RN Assessment (Patient s current Symptoms), include time called.    DX PYELONEPHRITIS  Breastfeeding: NO  Able to keep fluids/food down - patient continues with Fever today 101 F with tylenol and ibuprofen  Genitourinary symptoms:  Dysuria, frequency, flank pain - continues but taking pain medication 6/10 - \"same as when I left\"      RN Recommendations/Instructions per [place] ED lab result protocol  Patient notified of lab result and treatment recommendations.  Advised to discontinue Cipro and dispose of antibiotic at pharmacy - Rx for Cefpodoxime (Vantin) 200 MG tablet, 1 tablet (200 mg) by mouth 2 times daily for 9 days sent to [Pharmacy - [place], [place]].  RN reviewed information about probiotic & UTI prevention.    Patient Education on preventing future UTI's.  1. Practice good personal hygiene. Wipe yourself from front to back after using the toilet. This helps keep bacteria from getting into the urethra. Keep the genital area clean and dry.  2. Drink plenty of fluids, such as water, juice, or other caffeine-free drinks.  Drink at least 6-8 glasses a day (1 1/2 to 2 1/2 liters), unless you must restrict fluids for other medical reasons. This will force the medicine (antibiotic) into your urinary system and flush the bacteria out of your body. Cranberry juice has been shown to help clear out the bacteria.  3. Empty your bladder. Always empty your bladder when you feel the urge to urinate. And always urinate before going to sleep. Urine that stays in your bladder can lead to infection. Try to urinate before and after sex as well.  4. Wear cotton underwear and cotton-lined panty hose; avoid tight-fitting pants.  5. Follow up with your health care provider as directed. He or she may test to make sure the infection has cleared. If " necessary, additional treatment may be started.         Please Contact your PCP clinic or return to the Emergency department if your:    Symptoms return.    Symptoms do not improve after 3 days on antibiotic.    Symptoms do not resolve after completing antibiotic.    Symptoms worsen or other concerning symptom's.    PCP follow-up Questions asked: YES       Paul Cooper RN  [facility] Sleep Number [place]  Emergency Dept Lab Result RN  # [phone]     Copy of Lab result   Urine Culture  Order: [id number] - Reflex for Order [id number]   Status: Final result     Visible to patient: Yes (seen)    Specimen Information: Urine, Clean Catch         1 Result Note    Culture 10,000-50,000 CFU/mL Escherichia coli Abnormal             Resulting Agency: [facility]       Susceptibility      Escherichia coli (1)    Antibiotic Interpretation Sensitivity Method Status    Amoxicillin/Clavulanate Susceptible <=8 OREN Final    Ampicillin Susceptible <=8 OREN Final    Ampicillin/ Sulbactam Susceptible <=8 OREN Final    Aztreonam Susceptible <=4 OREN Final    Cefazolin Susceptible <=2 OREN Final    Cefepime Susceptible <=2 OREN Final    Ceftazidime Susceptible <=1 OREN Final    Ceftriaxone Susceptible <=1 OREN Final    Cefoxitin Susceptible <=8 OREN Final    Ciprofloxacin   OREN Final     Ciprofloxacin not resistant.  Due to test limitations, lab cannot provide OREN to determine susceptibility.       Ertapenem Susceptible <=0.5 OREN Final    Gentamicin Susceptible <=4 OREN Final    Imipenem Susceptible <=1 OREN Final    Levofloxacin   OREN Final     Levofloxacin not resistant.  Due to test limitations, lab cannot provide OREN to determine susceptibility.       Nitrofurantoin Susceptible <=32 OREN Final    Piperacillin/Tazobactam Susceptible <=16 OREN Final    Tetracycline Susceptible <=4 OREN Final    Tobramycin Susceptible <=4 OREN Final    Trimethoprim/Sulfamethoxazole Susceptible <=2/38 OREN Final    Cefpodoxime Susceptible <=2 OREN Final     Cefuroxime Susceptible <=4 OREN Final    Trimethoprim Susceptible <=8 OREN Final          Specimen Collected: 02/21/22  5:01 PM Last Resulted: 02/23/22  1:00 PM

## 2022-09-18 ENCOUNTER — HEALTH MAINTENANCE LETTER (OUTPATIENT)
Age: 27
End: 2022-09-18

## 2022-12-15 ENCOUNTER — VIRTUAL VISIT (OUTPATIENT)
Dept: OBGYN | Facility: OTHER | Age: 27
End: 2022-12-15
Attending: OBSTETRICS & GYNECOLOGY
Payer: COMMERCIAL

## 2022-12-15 VITALS — WEIGHT: 143 LBS | HEIGHT: 64 IN | BODY MASS INDEX: 24.41 KG/M2

## 2022-12-15 DIAGNOSIS — O36.80X0 ENCOUNTER TO DETERMINE FETAL VIABILITY OF PREGNANCY, SINGLE OR UNSPECIFIED FETUS: Primary | ICD-10-CM

## 2022-12-15 PROCEDURE — 99207 PR OB VISIT-NO CHARGE - GICH ONLY: CPT

## 2022-12-15 RX ORDER — PRENATAL VIT/IRON FUM/FOLIC AC 27MG-0.8MG
1 TABLET ORAL DAILY
COMMUNITY

## 2022-12-15 ASSESSMENT — PATIENT HEALTH QUESTIONNAIRE - PHQ9: SUM OF ALL RESPONSES TO PHQ QUESTIONS 1-9: 3

## 2022-12-15 NOTE — PROGRESS NOTES
Verbal consent obtained for telephone visit. Total length of call: 18 min    HPI:    This is a 27 year old female patient,  who was called today for OB Intake visit. Patient reports positive pregnancy test at home.     Obstetrical history and OB Demographics updated to the best of this nurse's ability based on patient report. PHQ-9 depression screening and routine Domestic Abuse screening completed. All immediate questions and concerns answered.    FOOD SECURITY SCREENING QUESTIONS:    The next two questions are to help us understand your food security.  If you are feeling you need any assistance in this area, we have resources available to support you today.    Hunger Vital Signs:  Within the past 12 months we worried whether our food would run out before we got money to buy more. Never  Within the past 12 months the food we bought just didn't last and we didn't have money to get more. Never    Last menstrual period is reported as Patient's last menstrual period was 2022 (approximate). ISSAC based on LMP is Estimated Date of Delivery: Aug 23, 2023.  Her cycles are irregular.  Her last menstrual period was delayed by 2 weeks.   Since her LMP, she has experienced  nausea and abdominal pain.       OBSTETRIC HISTORY:    OB History    Para Term  AB Living   4 2 2 0 1 2   SAB IAB Ectopic Multiple Live Births   0 0 0 0 2      # Outcome Date GA Lbr Femi/2nd Weight Sex Delivery Anes PTL Lv   4 Current            3 Term 21 37w1d  3.189 kg (7 lb 0.5 oz) M CS-LTranv   LES      Complications: Preeclampsia/Hypertension      Name: THERESA ARGUETA      Apgar1: 8  Apgar5: 8   2 AB 19 18w1d   M          Birth Comments: Fetal Hydrops   1 Term 18   3.201 kg (7 lb 0.9 oz) M CS-LVertical Gen  LES      Complications: Fetal Intolerance, Failure to Progress in First Stage      Name: ERIC ARGUETA      Apgar1: 7  Apgar5: 8       Age of first pregnancy: 23  Previous OB Provider: Nirmal and   Dr. Navjot Brand  Previous Delivering Clinic: [facility]  Release of Records: n/a    Current delivery plan: [facility]  Preferred OB Provider: Seth Morfin MD  Current Primary Care Provider: Dr. Rivera  Pediatrician: Dr. Rivera    Additional History:   Pregnancy risk factors include:  Hx of preeclampsia  Gestational DI first pregnancy  Hx of hydrops and demise with pregnancy    Have you travelled during the pregnancy?No  Have your sexual partner(s) travelled during the pregnancy?No      HISTORY:   Planned Pregnancy: Yes  Marital Status:   Occupation: stay at home mother  Living in Household: Spouse and Children    Father of the baby is involved.   Family and father of baby is supportive of current pregnancy.  Past Medical History of Father of Baby: A-Fib with RVR    Past History:  Her past medical history   Past Medical History:   Diagnosis Date     History of diabetes insipidus     in first pregnancy     History of diet controlled gestational diabetes mellitus (GDM)     first pregnancy     History of pre-eclampsia     first pregnancy   .      Her past surgical history:   Past Surgical History:   Procedure Laterality Date      SECTION  2018      SECTION N/A 2021    Procedure:  SECTION;  Surgeon: Seth Morfin MD;  Location:  OR       She has a history of  preeclampsia and gestational diabetes, controlled by oral medications    Since her last LMP she denies use of alcohol, tobacco and street drugs.    Pap smear history:   Last 3 Pap Results:   PAP (no units)   Date Value   2021 NIL       STD/STI history: No STD history    STD/STI symptoms: no noticeable symptoms     Past medical, surgical, social and family history were reviewed and updated in EPIC.    Medications reviewed by this nurse. Current medication list:  Current Outpatient Medications   Medication Sig Dispense Refill     Prenatal Vit-Fe Fumarate-FA (PRENATAL MULTIVITAMIN W/IRON) 27-0.8 MG tablet Take 1 tablet by  mouth daily       albuterol (PROVENTIL HFA) 108 (90 Base) MCG/ACT inhaler Inhale 1-2 puffs into the lungs every 4 hours as needed        EPINEPHrine (ANY BX GENERIC EQUIV) 0.3 MG/0.3ML injection 2-pack Inject 0.3 mg into the muscle       hydrOXYzine (ATARAX) 25 MG tablet Take 1 tablet by mouth every 6 hours as needed  1     The following medications were recommended to be discontinued due to Pregnancy Category D status: ibuprofen   Patient informed to contact her primary care provider as soon as possible to discuss a safer alternative.    Risk factors:  Moderate and moderately severe risks (consult with OB/Gyn)  Previous fetal or  demise: Yes- 18 weeks gestation  History of  delivery: No  History of heart disease Class I: No  Severe anemia, unresponsive to iron therapy: No  Pelvic mass or neoplasm: No  Previous : Yes- x2  Hyper/hypothyroidism: No  History of postpartum hemorrhage requiring transfusion:No  History of Placenta Accreta: No    High Risk (Pregnancy managed by OB/Gyn)  Multiple pregnancy: No  Pre-gestational diabetes: No  Chronic Hypertension: No  Renal Failure: No  Heart disease, class II or greater: No  Rh Isoimmunization: No  Chronic active hepatitis: No  Convulsive disorder, poorly controlled: No  Isoimmune thrombocytopenia: No  Pre-term premature rupture of membranes: No  Lupus or other autoimmune disorder: No  Human Immunodeficiency Virus: No      ASSESSMENT/PLAN:     No diagnosis found.    27 year old , 4w1d of pregnancy with ISSAC of 2023, Alternate ISSAC Entry    Per standing orders and scope of practice of this nurse, patient will have the following orders placed and completed prior to initial OB visit with the appropriate provider:    --early ultrasound for dating and viability ordered for 6+ weeks gestation based on LMP    --Quantitative Beta HCG and progesterone monitoring if indicated    Counseling given:     - Recommended weight gain for pregnancy: 25-35  lbs.   BMI < 18.5  28-40 lbs   18.5 - 24.9 25-35   25 - 29.9 15-25   > 30  < 15       PLAN/PATIENT INSTRUCTIONS:    Normal exercise.  Normal sexual activity.  Prenatal vitamins.  Anticipated weight gain.    follow-up appointment with Dr. Morfin for pre-tevin care and take multivitamin or pre-tevin vitamins    Lacey Shah RN.................................................. 12/15/2022 9:38 AM

## 2023-01-23 ENCOUNTER — PRENATAL OFFICE VISIT (OUTPATIENT)
Dept: OBGYN | Facility: OTHER | Age: 28
End: 2023-01-23
Attending: OBSTETRICS & GYNECOLOGY
Payer: COMMERCIAL

## 2023-01-23 VITALS
HEART RATE: 82 BPM | SYSTOLIC BLOOD PRESSURE: 126 MMHG | DIASTOLIC BLOOD PRESSURE: 74 MMHG | WEIGHT: 144 LBS | BODY MASS INDEX: 24.72 KG/M2

## 2023-01-23 DIAGNOSIS — O21.0 HYPEREMESIS GRAVIDARUM: ICD-10-CM

## 2023-01-23 DIAGNOSIS — Z34.81 ENCOUNTER FOR SUPERVISION OF OTHER NORMAL PREGNANCY IN FIRST TRIMESTER: Primary | ICD-10-CM

## 2023-01-23 LAB
ALBUMIN UR-MCNC: NEGATIVE MG/DL
APPEARANCE UR: CLEAR
BACTERIA #/AREA URNS HPF: ABNORMAL /HPF
BILIRUB UR QL STRIP: NEGATIVE
C TRACH DNA SPEC QL PROBE+SIG AMP: NEGATIVE
COLOR UR AUTO: ABNORMAL
GLUCOSE UR STRIP-MCNC: NEGATIVE MG/DL
HGB UR QL STRIP: NEGATIVE
KETONES UR STRIP-MCNC: NEGATIVE MG/DL
LEUKOCYTE ESTERASE UR QL STRIP: NEGATIVE
MUCOUS THREADS #/AREA URNS LPF: PRESENT /LPF
N GONORRHOEA DNA SPEC QL NAA+PROBE: NEGATIVE
NITRATE UR QL: NEGATIVE
PH UR STRIP: 6.5 [PH] (ref 5–9)
RBC URINE: 0 /HPF
SP GR UR STRIP: 1.02 (ref 1–1.03)
SQUAMOUS EPITHELIAL: 2 /HPF
UROBILINOGEN UR STRIP-MCNC: NORMAL MG/DL
WBC URINE: 3 /HPF

## 2023-01-23 PROCEDURE — 81001 URINALYSIS AUTO W/SCOPE: CPT | Mod: ZL | Performed by: OBSTETRICS & GYNECOLOGY

## 2023-01-23 PROCEDURE — 87086 URINE CULTURE/COLONY COUNT: CPT | Mod: ZL | Performed by: OBSTETRICS & GYNECOLOGY

## 2023-01-23 PROCEDURE — 99207 PR OB VISIT-NO CHARGE - GICH ONLY: CPT | Performed by: OBSTETRICS & GYNECOLOGY

## 2023-01-23 PROCEDURE — 76801 OB US < 14 WKS SINGLE FETUS: CPT | Performed by: OBSTETRICS & GYNECOLOGY

## 2023-01-23 PROCEDURE — 87491 CHLMYD TRACH DNA AMP PROBE: CPT | Mod: ZL | Performed by: OBSTETRICS & GYNECOLOGY

## 2023-01-23 RX ORDER — METOCLOPRAMIDE 10 MG/1
10 TABLET ORAL 4 TIMES DAILY PRN
Qty: 30 TABLET | Refills: 1 | Status: ON HOLD | OUTPATIENT
Start: 2023-01-23 | End: 2023-04-21

## 2023-01-23 ASSESSMENT — PATIENT HEALTH QUESTIONNAIRE - PHQ9: 5. POOR APPETITE OR OVEREATING: NOT AT ALL

## 2023-01-23 ASSESSMENT — ANXIETY QUESTIONNAIRES
2. NOT BEING ABLE TO STOP OR CONTROL WORRYING: NEARLY EVERY DAY
GAD7 TOTAL SCORE: 11
GAD7 TOTAL SCORE: 11
1. FEELING NERVOUS, ANXIOUS, OR ON EDGE: SEVERAL DAYS
3. WORRYING TOO MUCH ABOUT DIFFERENT THINGS: NEARLY EVERY DAY
7. FEELING AFRAID AS IF SOMETHING AWFUL MIGHT HAPPEN: NEARLY EVERY DAY
IF YOU CHECKED OFF ANY PROBLEMS ON THIS QUESTIONNAIRE, HOW DIFFICULT HAVE THESE PROBLEMS MADE IT FOR YOU TO DO YOUR WORK, TAKE CARE OF THINGS AT HOME, OR GET ALONG WITH OTHER PEOPLE: NOT DIFFICULT AT ALL
6. BECOMING EASILY ANNOYED OR IRRITABLE: SEVERAL DAYS
5. BEING SO RESTLESS THAT IT IS HARD TO SIT STILL: NOT AT ALL

## 2023-01-23 ASSESSMENT — PAIN SCALES - GENERAL: PAINLEVEL: NO PAIN (0)

## 2023-01-23 NOTE — PROGRESS NOTES
CC: New OB visit  HPI:  Paula Hagan is  at 9w5d based on Patient's last menstrual period was 2022 (approximate)./first trimester US.  She notes issues of nausea. No excessive thirst. She has history of GDI (diabetes insipidus)    OB History    Para Term  AB Living   4 2 2 0 1 2   SAB IAB Ectopic Multiple Live Births   0 0 0 0 2      # Outcome Date GA Lbr Femi/2nd Weight Sex Delivery Anes PTL Lv   4 Current            3 Term 21 37w1d  3.189 kg (7 lb 0.5 oz) M CS-LTranv   LES      Complications: Preeclampsia/Hypertension      Name: THERESA ARGUETA      Apgar1: 8  Apgar5: 8   2 AB 19 18w1d   M          Birth Comments: Fetal Hydrops   1 Term 18   3.201 kg (7 lb 0.9 oz) M CS-LVertical Gen  LES      Complications: Fetal Intolerance, Failure to Progress in First Stage      Name: ERIC ARGUETA      Apgar1: 7  Apgar5: 8     STI: (denies HSV, Hep C, Hep B, HIV, Syphilis, Chlamydia, Gonorrhea)  Last pap smear:   Lab Results   Component Value Date    PAP NIL 2021     Past Medical History:   Diagnosis Date     Gestational diabetes      History of diabetes insipidus     in first pregnancy     History of diet controlled gestational diabetes mellitus (GDM)     first pregnancy     History of pre-eclampsia     first pregnancy     PID (pelvic inflammatory disease)      Uncomplicated asthma      Varicella       has a past surgical history that includes  section (2018) and  section (N/A, 2021).    Social History     Tobacco Use     Smoking status: Never     Smokeless tobacco: Never   Vaping Use     Vaping Use: Never used   Substance Use Topics     Alcohol use: Not Currently     Drug use: Never     History reviewed. No pertinent family history.      Current Outpatient Medications   Medication     EPINEPHrine (ANY BX GENERIC EQUIV) 0.3 MG/0.3ML injection 2-pack     Prenatal Vit-Fe Fumarate-FA (PRENATAL MULTIVITAMIN W/IRON) 27-0.8 MG tablet     albuterol (PROAIR  HFA/PROVENTIL HFA/VENTOLIN HFA) 108 (90 Base) MCG/ACT inhaler     hydrOXYzine (ATARAX) 25 MG tablet     No current facility-administered medications for this visit.     Allergies   Allergen Reactions     Bee Venom Anaphylaxis     Morphine Itching and Hives     Tolerates HYDROmorphone (DILUDID)  Tolerates HYDROmorphone (DILUDID)     Buspirone Other (See Comments)     Headache, migraine  migraines     Sertraline Muscle Pain (Myalgia)     Immunization History   Administered Date(s) Administered     DTAP-IPV/HIB (PENTACEL) 1996, 1996, 02/14/1997     DTaP, Unspecified 02/09/1998, 08/09/2001     HPV Quadrivalent 03/01/2016     HPV9 01/28/2016, 08/18/2016     Hep B, Peds or Adolescent 1996, 1996, 09/01/1998     HepB-Adult 11/22/2019     Hib (PRP-T) 02/09/1998     Influenza Vaccine >6 months (Alfuria,Fluzone) 10/13/2017, 10/11/2018, 11/22/2019, 11/13/2020     Influenza Vaccine, 6+MO IM (QUADRIVALENT W/PRESERVATIVES) 09/17/2015     MMR 02/09/1998, 08/09/2001, 02/20/2021     Mantoux Tuberculin Skin Test 09/17/2015, 08/18/2016     Meningococcal ACWY (Menactra ) 01/28/2016     Poliovirus, inactivated (IPV) 08/09/2001     TDAP Vaccine (Adacel) 01/28/2016     TDAP Vaccine (Boostrix) 06/25/2018     Tdap (Adacel,Boostrix) 12/03/2020       REVIEW OF SYSTEMS  Neg except as above    EXAM: /74   Pulse 82   Wt 65.3 kg (144 lb)   LMP 11/16/2022 (Approximate)   BMI 24.72 kg/m    Gen: NAD  CV: RRR with normal S1, S2, no GRM  Resp: CTA Bilaterally  Neck: supple without thyromegaly mass or noduels  Extremities: No TTP, no deformity  Neuro: CN II-XII intact grossly, moves all extremities  Psych: normal affect and mentation.    Recent Results (from the past 24 hour(s))   US OB < 14 Weeks ( OB/GYN ONLY)    Narrative    Early OB US    Indication: dates    The [employer]pin Affiniti 50 W Ultrasound machine was used with the C6-2 probe.  With use of realtime 2-D and still images a viable meyer intrauterine  gestation is identified.    CRL: 3.35 cm at 10 weeks 2 days with EDC by US of 8/19/23  Yolk Sac: 4 mm    Fetal cardiac activity: 167 bpm by use of pw-mode doppler    Right ovary: normal  Left ovary: normal with CL  Uterus: anteverted and normal shape and size    Cul-de-sac: Normal with free fluid  Cervix: Normal without evidence of funneling    I/P: Viable meyer intrauterine pregnancy at 10 weeks 2 days with US EDC of 8/19/23 which is concordant with menstrual date 8/24/23.           I/P  (Z34.81) Encounter for supervision of other normal pregnancy in first trimester  (primary encounter diagnosis)  Comment:   Plan: UA with Microscopic reflex to Culture, Urine         Culture, Treponema Abs w Reflex to RPR and         Titer, Rubella Antibody IgG, [employer]ae         Non-Invasive Prenatal Screening, ABO/Rh type         and screen, CBC with Platelets & Differential,         Chlamydia trachomatis/Neisseria gonorrhoeae by         PCR, Hepatitis B surface antigen, Hepatitis C         Screen Reflex to HCV RNA Quant and Genotype,         HIV Antigen Antibody Combo, US OB < 14 Weeks         ( OB/GYN ONLY), C US OB 1ST TRIMESTER         MAT/FETAL, SINGLE GESTATION - GICH          Discussed safety, nutrition, screening for cystic fibrosis, spina bifida, spinal muscular atrophy, quad screen, cffDNA screening as appropriate.  F/U scheduled, discussed call schedule rotation. Flu and COVID vaccinations recommended as per ACOG and [facility] guidelines.  Return visit in 1 month     Pregnancy risk factors include:  Hx of preeclampsia  Hx of gestational DI- monitor for polydipsia, polyuria  Hx of hydrops with fetal demise    Seth Morfin MD FACOG  3:40 PM 1/23/2023

## 2023-01-23 NOTE — NURSING NOTE
"Chief Complaint   Patient presents with     Prenatal Care     New OB   Patient is here for New OB. Patient is not having any bleeding but not cramping     Initial /74   Pulse 82   Wt 65.3 kg (144 lb)   LMP 11/16/2022 (Approximate)   BMI 24.72 kg/m   Estimated body mass index is 24.72 kg/m  as calculated from the following:    Height as of 12/15/22: 1.626 m (5' 4\").    Weight as of this encounter: 65.3 kg (144 lb).  Medication Reconciliation: complete           Sangeeta Nova, DALIA  "

## 2023-01-25 LAB — BACTERIA UR CULT: NORMAL

## 2023-01-30 LAB
ABO/RH(D): NORMAL
ANTIBODY SCREEN: NEGATIVE
SPECIMEN EXPIRATION DATE: NORMAL

## 2023-01-31 ENCOUNTER — LAB (OUTPATIENT)
Dept: LAB | Facility: OTHER | Age: 28
End: 2023-01-31
Attending: OBSTETRICS & GYNECOLOGY
Payer: COMMERCIAL

## 2023-01-31 DIAGNOSIS — Z34.81 ENCOUNTER FOR SUPERVISION OF OTHER NORMAL PREGNANCY IN FIRST TRIMESTER: ICD-10-CM

## 2023-01-31 LAB
BASOPHILS # BLD AUTO: 0 10E3/UL (ref 0–0.2)
BASOPHILS NFR BLD AUTO: 0 %
EOSINOPHIL # BLD AUTO: 0.1 10E3/UL (ref 0–0.7)
EOSINOPHIL NFR BLD AUTO: 2 %
ERYTHROCYTE [DISTWIDTH] IN BLOOD BY AUTOMATED COUNT: 12 % (ref 10–15)
HCT VFR BLD AUTO: 38.4 % (ref 35–47)
HGB BLD-MCNC: 13.4 G/DL (ref 11.7–15.7)
IMM GRANULOCYTES # BLD: 0 10E3/UL
IMM GRANULOCYTES NFR BLD: 0 %
LYMPHOCYTES # BLD AUTO: 2.1 10E3/UL (ref 0.8–5.3)
LYMPHOCYTES NFR BLD AUTO: 35 %
MCH RBC QN AUTO: 31.5 PG (ref 26.5–33)
MCHC RBC AUTO-ENTMCNC: 34.9 G/DL (ref 31.5–36.5)
MCV RBC AUTO: 90 FL (ref 78–100)
MONOCYTES # BLD AUTO: 0.4 10E3/UL (ref 0–1.3)
MONOCYTES NFR BLD AUTO: 7 %
NEUTROPHILS # BLD AUTO: 3.4 10E3/UL (ref 1.6–8.3)
NEUTROPHILS NFR BLD AUTO: 56 %
NRBC # BLD AUTO: 0 10E3/UL
NRBC BLD AUTO-RTO: 0 /100
PLATELET # BLD AUTO: 177 10E3/UL (ref 150–450)
RBC # BLD AUTO: 4.26 10E6/UL (ref 3.8–5.2)
WBC # BLD AUTO: 6 10E3/UL (ref 4–11)

## 2023-01-31 PROCEDURE — 86901 BLOOD TYPING SEROLOGIC RH(D): CPT | Mod: ZL

## 2023-01-31 PROCEDURE — 86780 TREPONEMA PALLIDUM: CPT | Mod: ZL

## 2023-01-31 PROCEDURE — 87340 HEPATITIS B SURFACE AG IA: CPT | Mod: ZL

## 2023-01-31 PROCEDURE — 86803 HEPATITIS C AB TEST: CPT | Mod: ZL

## 2023-01-31 PROCEDURE — 36415 COLL VENOUS BLD VENIPUNCTURE: CPT | Mod: ZL

## 2023-01-31 PROCEDURE — 85004 AUTOMATED DIFF WBC COUNT: CPT | Mod: ZL

## 2023-01-31 PROCEDURE — 86762 RUBELLA ANTIBODY: CPT | Mod: ZL

## 2023-01-31 PROCEDURE — 87389 HIV-1 AG W/HIV-1&-2 AB AG IA: CPT | Mod: ZL

## 2023-02-01 LAB
HBV SURFACE AG SERPL QL IA: NONREACTIVE
HCV AB SERPL QL IA: NONREACTIVE
HIV 1+2 AB+HIV1 P24 AG SERPL QL IA: NONREACTIVE
RUBV IGG SERPL QL IA: 13.5 INDEX
RUBV IGG SERPL QL IA: POSITIVE
T PALLIDUM AB SER QL: NONREACTIVE

## 2023-02-06 LAB — SCANNED LAB RESULT: NORMAL

## 2023-02-24 ENCOUNTER — PRENATAL OFFICE VISIT (OUTPATIENT)
Dept: OBGYN | Facility: OTHER | Age: 28
End: 2023-02-24
Attending: OBSTETRICS & GYNECOLOGY
Payer: COMMERCIAL

## 2023-02-24 VITALS
DIASTOLIC BLOOD PRESSURE: 86 MMHG | WEIGHT: 141.8 LBS | OXYGEN SATURATION: 99 % | SYSTOLIC BLOOD PRESSURE: 144 MMHG | HEART RATE: 82 BPM | RESPIRATION RATE: 16 BRPM | BODY MASS INDEX: 24.34 KG/M2

## 2023-02-24 DIAGNOSIS — Z86.39 HISTORY OF HYPOTHYROIDISM: ICD-10-CM

## 2023-02-24 DIAGNOSIS — O09.292 HISTORY OF PRE-ECLAMPSIA IN PRIOR PREGNANCY, CURRENTLY PREGNANT, SECOND TRIMESTER: Primary | ICD-10-CM

## 2023-02-24 DIAGNOSIS — Z86.39 HISTORY OF DIABETES INSIPIDUS: ICD-10-CM

## 2023-02-24 LAB — TSH SERPL DL<=0.005 MIU/L-ACNC: 2.8 UIU/ML (ref 0.3–4.2)

## 2023-02-24 PROCEDURE — 84443 ASSAY THYROID STIM HORMONE: CPT | Mod: ZL | Performed by: OBSTETRICS & GYNECOLOGY

## 2023-02-24 PROCEDURE — 99207 PR OB VISIT-NO CHARGE - GICH ONLY: CPT | Performed by: OBSTETRICS & GYNECOLOGY

## 2023-02-24 PROCEDURE — 36415 COLL VENOUS BLD VENIPUNCTURE: CPT | Mod: ZL | Performed by: OBSTETRICS & GYNECOLOGY

## 2023-02-24 ASSESSMENT — ANXIETY QUESTIONNAIRES
GAD7 TOTAL SCORE: 17
5. BEING SO RESTLESS THAT IT IS HARD TO SIT STILL: SEVERAL DAYS
3. WORRYING TOO MUCH ABOUT DIFFERENT THINGS: NEARLY EVERY DAY
6. BECOMING EASILY ANNOYED OR IRRITABLE: NEARLY EVERY DAY
1. FEELING NERVOUS, ANXIOUS, OR ON EDGE: NEARLY EVERY DAY
7. FEELING AFRAID AS IF SOMETHING AWFUL MIGHT HAPPEN: NEARLY EVERY DAY
2. NOT BEING ABLE TO STOP OR CONTROL WORRYING: NEARLY EVERY DAY
IF YOU CHECKED OFF ANY PROBLEMS ON THIS QUESTIONNAIRE, HOW DIFFICULT HAVE THESE PROBLEMS MADE IT FOR YOU TO DO YOUR WORK, TAKE CARE OF THINGS AT HOME, OR GET ALONG WITH OTHER PEOPLE: SOMEWHAT DIFFICULT
GAD7 TOTAL SCORE: 17

## 2023-02-24 ASSESSMENT — PAIN SCALES - GENERAL: PAINLEVEL: NO PAIN (0)

## 2023-02-24 ASSESSMENT — PATIENT HEALTH QUESTIONNAIRE - PHQ9: 5. POOR APPETITE OR OVEREATING: SEVERAL DAYS

## 2023-02-24 NOTE — NURSING NOTE
"Chief Complaint   Patient presents with     Prenatal Care     14w2d         Initial BP (!) 144/86   Pulse 82   Resp 16   Wt 64.3 kg (141 lb 12.8 oz)   LMP 11/16/2022 (Approximate)   SpO2 99%   Breastfeeding No   BMI 24.34 kg/m   Estimated body mass index is 24.34 kg/m  as calculated from the following:    Height as of 12/15/22: 1.626 m (5' 4\").    Weight as of this encounter: 64.3 kg (141 lb 12.8 oz).         Norma J. Gosselin, DALIA   "

## 2023-02-24 NOTE — PROGRESS NOTES
CC: Recheck OB visit at 14w2d    HPI: Felix Hagan presents for a routine OB visit now at 14w2d  Concerns: Anxiousness, nauseated  Patient notices normal fetal movement, denies contractions, vaginal bleeding or leaking of fluid.    OB History    Para Term  AB Living   4 2 2 0 1 2   SAB IAB Ectopic Multiple Live Births   0 0 0 0 2      # Outcome Date GA Lbr Femi/2nd Weight Sex Delivery Anes PTL Lv   4 Current            3 Term 21 37w1d  3.189 kg (7 lb 0.5 oz) M CS-LTranv   LES      Complications: Preeclampsia/Hypertension      Name: LOIS ARGUETA-FELIX      Apgar1: 8  Apgar5: 8   2 AB 19 18w1d   M          Birth Comments: Fetal Hydrops   1 Term 18   3.201 kg (7 lb 0.9 oz) M CS-LVertical Gen  LES      Complications: Fetal Intolerance, Failure to Progress in First Stage      Name: ERIC ARGUETA      Apgar1: 7  Apgar5: 8     Current Outpatient Medications   Medication     EPINEPHrine (ANY BX GENERIC EQUIV) 0.3 MG/0.3ML injection 2-pack     metoclopramide (REGLAN) 10 MG tablet     Prenatal Vit-Fe Fumarate-FA (PRENATAL MULTIVITAMIN W/IRON) 27-0.8 MG tablet     albuterol (PROAIR HFA/PROVENTIL HFA/VENTOLIN HFA) 108 (90 Base) MCG/ACT inhaler     hydrOXYzine (ATARAX) 25 MG tablet     No current facility-administered medications for this visit.       O: Pulse 82   Resp 16   Wt 64.3 kg (141 lb 12.8 oz)   LMP 2022 (Approximate)   SpO2 99%   Breastfeeding No   BMI 24.34 kg/m    Body mass index is 24.34 kg/m .  See OB flow sheet  EXAM:  NAD    FHT:160 bpm    No results found for any visits on 23.    A/P: 14w2d gestation    Recheck in 4 weeks  Recheck tsh today with history of hypothyroidism during prior gestation.    Problem List:     Pregnancy risk factors include:  Hx of preeclampsia  Hx of gestational DI- monitor for polydipsia, polyuria  Hx of hydrops with fetal demise in mid-trimester    Seth Morfin MD FACOG  3:53 PM 2023

## 2023-03-27 ENCOUNTER — PRENATAL OFFICE VISIT (OUTPATIENT)
Dept: OBGYN | Facility: OTHER | Age: 28
End: 2023-03-27
Attending: OBSTETRICS & GYNECOLOGY
Payer: COMMERCIAL

## 2023-03-27 VITALS
DIASTOLIC BLOOD PRESSURE: 70 MMHG | HEART RATE: 96 BPM | SYSTOLIC BLOOD PRESSURE: 120 MMHG | BODY MASS INDEX: 24.37 KG/M2 | WEIGHT: 142 LBS

## 2023-03-27 DIAGNOSIS — Z86.39 HISTORY OF DIABETES INSIPIDUS: ICD-10-CM

## 2023-03-27 DIAGNOSIS — Z34.90 NORMAL PREGNANCY, ANTEPARTUM: Primary | ICD-10-CM

## 2023-03-27 DIAGNOSIS — O09.292 HISTORY OF PRE-ECLAMPSIA IN PRIOR PREGNANCY, CURRENTLY PREGNANT, SECOND TRIMESTER: ICD-10-CM

## 2023-03-27 PROCEDURE — 99207 PR OB VISIT-NO CHARGE - GICH ONLY: CPT | Performed by: OBSTETRICS & GYNECOLOGY

## 2023-03-27 ASSESSMENT — PAIN SCALES - GENERAL: PAINLEVEL: NO PAIN (0)

## 2023-03-27 NOTE — NURSING NOTE
"Chief Complaint   Patient presents with     Prenatal Care     18 week 5 days   patient presents to clinic for 18 week 5 day check up and doing well except still having nausea and vomiting, patient is taking Reglan which does seem to help.      Initial /70   Pulse 96   Wt 64.4 kg (142 lb)   LMP 11/16/2022 (Approximate)   BMI 24.37 kg/m   Estimated body mass index is 24.37 kg/m  as calculated from the following:    Height as of 12/15/22: 1.626 m (5' 4\").    Weight as of this encounter: 64.4 kg (142 lb).  Medication Reconciliation: complete          Sangeeta Nova LPN  "

## 2023-03-27 NOTE — PROGRESS NOTES
CC: Recheck OB visit at 18w5d    HPI: Felix Hagan presents for a routine OB visit now at 18w5d  Concerns: Nauseated and some vomiting  Patient notices normal fetal movement, denies contractions, vaginal bleeding or leaking of fluid.    OB History    Para Term  AB Living   4 2 2 0 1 2   SAB IAB Ectopic Multiple Live Births   0 0 0 0 2      # Outcome Date GA Lbr Femi/2nd Weight Sex Delivery Anes PTL Lv   4 Current            3 Term 21 37w1d  3.189 kg (7 lb 0.5 oz) M CS-LTranv   LES      Complications: Preeclampsia/Hypertension      Name: LOIS ARGUETA-FELIX      Apgar1: 8  Apgar5: 8   2 AB 19 18w1d   M          Birth Comments: Fetal Hydrops   1 Term 18   3.201 kg (7 lb 0.9 oz) M CS-LVertical Gen  LES      Complications: Fetal Intolerance, Failure to Progress in First Stage      Name: ERIC ARGUETA      Apgar1: 7  Apgar5: 8     Current Outpatient Medications   Medication     aspirin (ASA) 81 MG EC tablet     EPINEPHrine (ANY BX GENERIC EQUIV) 0.3 MG/0.3ML injection 2-pack     metoclopramide (REGLAN) 10 MG tablet     Prenatal Vit-Fe Fumarate-FA (PRENATAL MULTIVITAMIN W/IRON) 27-0.8 MG tablet     albuterol (PROAIR HFA/PROVENTIL HFA/VENTOLIN HFA) 108 (90 Base) MCG/ACT inhaler     hydrOXYzine (ATARAX) 25 MG tablet     No current facility-administered medications for this visit.     O: /70   Pulse 96   Wt 64.4 kg (142 lb)   LMP 2022 (Approximate)   BMI 24.37 kg/m    Body mass index is 24.37 kg/m .  See OB flow sheet  EXAM:  NAD    FHT: 155 bpm    No results found for any visits on 23.    A/P: 18w5d gestation    Recheck in 2 weeks  MFM scan for history of hydrops with prior pregnancy  Continue nausea meds prn.  Monitor for signs of DI with excessive thirst or urination.    Problem List:   Pregnancy risk factors include:  Hx of preeclampsia  Hx of gestational DI- monitor for polydipsia, polyuria  Hx of hydrops with fetal demise in mid-trimester    Seth Morfin MD  FACOG  10:36 AM 3/27/2023

## 2023-04-05 ENCOUNTER — HOSPITAL ENCOUNTER (OUTPATIENT)
Dept: ULTRASOUND IMAGING | Facility: CLINIC | Age: 28
Discharge: HOME | End: 2023-04-05
Attending: OBSTETRICS & GYNECOLOGY
Payer: COMMERCIAL

## 2023-04-05 ENCOUNTER — HOSPITAL ENCOUNTER (OUTPATIENT)
Dept: ULTRASOUND IMAGING | Facility: OTHER | Age: 28
Discharge: HOME | End: 2023-04-05
Attending: OBSTETRICS & GYNECOLOGY | Admitting: OBSTETRICS & GYNECOLOGY
Payer: COMMERCIAL

## 2023-04-05 ENCOUNTER — OFFICE VISIT (OUTPATIENT)
Dept: MATERNAL FETAL MEDICINE | Facility: CLINIC | Age: 28
End: 2023-04-05
Attending: OBSTETRICS & GYNECOLOGY
Payer: COMMERCIAL

## 2023-04-05 DIAGNOSIS — O35.CXX0 PLEURAL EFFUSION, FETAL, AFFECTING CARE OF MOTHER, ANTEPARTUM: Primary | ICD-10-CM

## 2023-04-05 DIAGNOSIS — O09.292 PRIOR PREGNANCY WITH FETAL DEMISE AND CURRENT PREGNANCY IN SECOND TRIMESTER: ICD-10-CM

## 2023-04-05 DIAGNOSIS — Z86.39 HISTORY OF DIABETES INSIPIDUS: ICD-10-CM

## 2023-04-05 PROCEDURE — 76811 OB US DETAILED SNGL FETUS: CPT

## 2023-04-05 PROCEDURE — 76811 OB US DETAILED SNGL FETUS: CPT | Mod: 26 | Performed by: OBSTETRICS & GYNECOLOGY

## 2023-04-05 PROCEDURE — 76946 ECHO GUIDE FOR AMNIOCENTESIS: CPT | Mod: 26 | Performed by: OBSTETRICS & GYNECOLOGY

## 2023-04-05 PROCEDURE — 59000 AMNIOCENTESIS DIAGNOSTIC: CPT | Performed by: OBSTETRICS & GYNECOLOGY

## 2023-04-05 PROCEDURE — 76821 MIDDLE CEREBRAL ARTERY ECHO: CPT | Mod: 26 | Performed by: OBSTETRICS & GYNECOLOGY

## 2023-04-05 PROCEDURE — 99205 OFFICE O/P NEW HI 60 MIN: CPT | Mod: 25 | Performed by: OBSTETRICS & GYNECOLOGY

## 2023-04-05 NOTE — PROGRESS NOTES
Type of service:    Telemedicine Office Visit for fetal ultrasound and consultation    Date of service:     Date: 04/05/23     Time service began:    8:00 AM  Time service ended:    9:00 AM    Reason:      : Lack of available service in patient area    Description of basis or telemedicine appropriateness:     Consultation provided at the request of Dr. Morfin for advice regarding the diagnosis and treatment of this patient's fetal ultrasound and consultation.  The patient's condition can be safely assessed via telemedicine.    The Mode of Transmission:    Secure interactive audio and visual telecommunication system (Video Guidance)    Location of originating and distant sites:      Originating site:   [place], [place]    Distant site:    [place], [place]    Mary Llamas MD

## 2023-04-06 ENCOUNTER — PRENATAL OFFICE VISIT (OUTPATIENT)
Dept: OBGYN | Facility: OTHER | Age: 28
End: 2023-04-06
Attending: OBSTETRICS & GYNECOLOGY
Payer: COMMERCIAL

## 2023-04-06 VITALS
HEART RATE: 86 BPM | SYSTOLIC BLOOD PRESSURE: 130 MMHG | DIASTOLIC BLOOD PRESSURE: 70 MMHG | WEIGHT: 143 LBS | BODY MASS INDEX: 24.55 KG/M2

## 2023-04-06 DIAGNOSIS — O35.CXX0 PLEURAL EFFUSION OF FETUS AFFECTING MANAGEMENT OF MOTHER, ANTEPARTUM: Primary | ICD-10-CM

## 2023-04-06 LAB
ALBUMIN SERPL BCG-MCNC: 3.9 G/DL (ref 3.5–5.2)
ALP SERPL-CCNC: 57 U/L (ref 35–104)
ALT SERPL W P-5'-P-CCNC: 13 U/L (ref 10–35)
ANION GAP SERPL CALCULATED.3IONS-SCNC: 9 MMOL/L (ref 7–15)
AST SERPL W P-5'-P-CCNC: 16 U/L (ref 10–35)
BILIRUB SERPL-MCNC: 1 MG/DL
BUN SERPL-MCNC: 8 MG/DL (ref 6–20)
CALCIUM SERPL-MCNC: 9 MG/DL (ref 8.6–10)
CHLORIDE SERPL-SCNC: 104 MMOL/L (ref 98–107)
CREAT SERPL-MCNC: 0.61 MG/DL (ref 0.51–0.95)
DEPRECATED HCO3 PLAS-SCNC: 25 MMOL/L (ref 22–29)
ERYTHROCYTE [DISTWIDTH] IN BLOOD BY AUTOMATED COUNT: 14.1 % (ref 10–15)
GFR SERPL CREATININE-BSD FRML MDRD: >90 ML/MIN/1.73M2
GLUCOSE SERPL-MCNC: 86 MG/DL (ref 70–99)
HCT VFR BLD AUTO: 37.9 % (ref 35–47)
HGB BLD-MCNC: 13.1 G/DL (ref 11.7–15.7)
HOLD SPECIMEN: NORMAL
MCH RBC QN AUTO: 31.5 PG (ref 26.5–33)
MCHC RBC AUTO-ENTMCNC: 34.6 G/DL (ref 31.5–36.5)
MCV RBC AUTO: 91 FL (ref 78–100)
PLATELET # BLD AUTO: 190 10E3/UL (ref 150–450)
POTASSIUM SERPL-SCNC: 3.8 MMOL/L (ref 3.4–5.3)
PROT SERPL-MCNC: 6.8 G/DL (ref 6.4–8.3)
RBC # BLD AUTO: 4.16 10E6/UL (ref 3.8–5.2)
SODIUM SERPL-SCNC: 138 MMOL/L (ref 136–145)
TSH SERPL DL<=0.005 MIU/L-ACNC: 3.04 UIU/ML (ref 0.3–4.2)
WBC # BLD AUTO: 6.2 10E3/UL (ref 4–11)

## 2023-04-06 PROCEDURE — 99207 PR OB VISIT-NO CHARGE - GICH ONLY: CPT | Performed by: OBSTETRICS & GYNECOLOGY

## 2023-04-06 PROCEDURE — 80053 COMPREHEN METABOLIC PANEL: CPT | Mod: ZL | Performed by: OBSTETRICS & GYNECOLOGY

## 2023-04-06 PROCEDURE — 85027 COMPLETE CBC AUTOMATED: CPT | Mod: ZL | Performed by: OBSTETRICS & GYNECOLOGY

## 2023-04-06 PROCEDURE — 84443 ASSAY THYROID STIM HORMONE: CPT | Mod: ZL | Performed by: OBSTETRICS & GYNECOLOGY

## 2023-04-06 PROCEDURE — 36415 COLL VENOUS BLD VENIPUNCTURE: CPT | Mod: ZL | Performed by: OBSTETRICS & GYNECOLOGY

## 2023-04-06 ASSESSMENT — PAIN SCALES - GENERAL: PAINLEVEL: MILD PAIN (3)

## 2023-04-06 NOTE — NURSING NOTE
"Chief Complaint   Patient presents with     Prenatal Care     20 w 1 day   patient is here for 20 week 1 day and follow up ultrasound yesterday.     Initial /70   Pulse 86   Wt 64.9 kg (143 lb)   LMP 11/16/2022 (Approximate)   BMI 24.55 kg/m   Estimated body mass index is 24.55 kg/m  as calculated from the following:    Height as of 12/15/22: 1.626 m (5' 4\").    Weight as of this encounter: 64.9 kg (143 lb).  Medication Reconciliation: complete          Sangeeta Nova LPN  "

## 2023-04-06 NOTE — PROGRESS NOTES
CC: Recheck OB visit at 20w1d    HPI: Paula Hagan presents for a routine OB visit now at 20w1d  Concerns: Bilateral pleural effusions on US yesterday. [facility] scan and consult pending tomorrow.  She is due for a tsh today. She is anxious about the findings, tearful at times. A prior pregnancy resulted in loss of the fetus in mid-trimester with hydrops.    OB History    Para Term  AB Living   4 2 2 0 1 2   SAB IAB Ectopic Multiple Live Births   0 0 0 0 2      # Outcome Date GA Lbr Femi/2nd Weight Sex Delivery Anes PTL Lv   4 Current            3 Term 21 37w1d  3.189 kg (7 lb 0.5 oz) M CS-LTranv   LES      Complications: Preeclampsia/Hypertension      Name: THERESA ARGUETA      Apgar1: 8  Apgar5: 8   2 AB 19 18w1d   M          Birth Comments: Fetal Hydrops   1 Term 18   3.201 kg (7 lb 0.9 oz) M CS-LVertical Gen  LES      Complications: Fetal Intolerance, Failure to Progress in First Stage      Name: ERIC ARGUETA      Apgar1: 7  Apgar5: 8     Current Outpatient Medications   Medication     aspirin (ASA) 81 MG EC tablet     EPINEPHrine (ANY BX GENERIC EQUIV) 0.3 MG/0.3ML injection 2-pack     metoclopramide (REGLAN) 10 MG tablet     Prenatal Vit-Fe Fumarate-FA (PRENATAL MULTIVITAMIN W/IRON) 27-0.8 MG tablet     albuterol (PROAIR HFA/PROVENTIL HFA/VENTOLIN HFA) 108 (90 Base) MCG/ACT inhaler     hydrOXYzine (ATARAX) 25 MG tablet     No current facility-administered medications for this visit.     O: /70   Pulse 86   Wt 64.9 kg (143 lb)   LMP 2022 (Approximate)   BMI 24.55 kg/m    Body mass index is 24.55 kg/m .  See OB flow sheet  EXAM:  NAD, understandably anxious    No results found for any visits on 23.    A/P: 20w1d gestation  Appreciate [facility]'s compassionate care  F/u as scheduled and recommended by [facility]  TSH, CBC, CMP today    Problem List:   Pregnancy risk factors include:  Hx of preeclampsia  Hx of gestational DI- monitor for polydipsia, polyuria  Hx of hydrops with  fetal demise in mid-trimester    (O35.CXX0) Pleural effusion of fetus affecting management of mother, antepartum  (primary encounter diagnosis)  Comment:   Plan: CBC W PLT No Diff, Comprehensive Metabolic         Panel, TSH Reflex GH          MFM referral placed      Seth Morfin MD FACOG  11:44 AM 4/6/2023

## 2023-04-07 ENCOUNTER — HOSPITAL ENCOUNTER (OUTPATIENT)
Dept: CARDIOLOGY | Facility: CLINIC | Age: 28
Discharge: HOME | End: 2023-04-07
Attending: OBSTETRICS & GYNECOLOGY
Payer: COMMERCIAL

## 2023-04-07 ENCOUNTER — OFFICE VISIT (OUTPATIENT)
Dept: MATERNAL FETAL MEDICINE | Facility: CLINIC | Age: 28
End: 2023-04-07
Attending: OBSTETRICS & GYNECOLOGY
Payer: COMMERCIAL

## 2023-04-07 ENCOUNTER — HOSPITAL ENCOUNTER (OUTPATIENT)
Dept: ULTRASOUND IMAGING | Facility: CLINIC | Age: 28
Discharge: HOME | End: 2023-04-07
Attending: OBSTETRICS & GYNECOLOGY
Payer: COMMERCIAL

## 2023-04-07 ENCOUNTER — OFFICE VISIT (OUTPATIENT)
Dept: CARDIOLOGY | Facility: CLINIC | Age: 28
End: 2023-04-07
Payer: COMMERCIAL

## 2023-04-07 DIAGNOSIS — O35.BXX0 FETAL CARDIAC DISEASE AFFECTING PREGNANCY, SINGLE OR UNSPECIFIED FETUS: Primary | ICD-10-CM

## 2023-04-07 DIAGNOSIS — O35.CXX0 PLEURAL EFFUSION, FETAL, AFFECTING CARE OF MOTHER, ANTEPARTUM: ICD-10-CM

## 2023-04-07 DIAGNOSIS — O35.CXX0 PLEURAL EFFUSION, FETAL, AFFECTING CARE OF MOTHER, ANTEPARTUM: Primary | ICD-10-CM

## 2023-04-07 DIAGNOSIS — O09.292 PRIOR POOR OBSTETRICAL HISTORY IN SECOND TRIMESTER, ANTEPARTUM: ICD-10-CM

## 2023-04-07 PROCEDURE — 76825 ECHO EXAM OF FETAL HEART: CPT

## 2023-04-07 PROCEDURE — 93325 DOPPLER ECHO COLOR FLOW MAPG: CPT | Mod: XU

## 2023-04-07 PROCEDURE — 36415 COLL VENOUS BLD VENIPUNCTURE: CPT | Performed by: OBSTETRICS & GYNECOLOGY

## 2023-04-07 PROCEDURE — 99211 OFF/OP EST MAY X REQ PHY/QHP: CPT | Mod: 25 | Performed by: OBSTETRICS & GYNECOLOGY

## 2023-04-07 PROCEDURE — 81229 CYTOG ALYS CHRML ABNR SNPCGH: CPT | Performed by: OBSTETRICS & GYNECOLOGY

## 2023-04-07 PROCEDURE — 76821 MIDDLE CEREBRAL ARTERY ECHO: CPT | Mod: 26 | Performed by: OBSTETRICS & GYNECOLOGY

## 2023-04-07 PROCEDURE — 84999 UNLISTED CHEMISTRY PROCEDURE: CPT | Performed by: OBSTETRICS & GYNECOLOGY

## 2023-04-07 PROCEDURE — 81265 STR MARKERS SPECIMEN ANAL: CPT | Performed by: OBSTETRICS & GYNECOLOGY

## 2023-04-07 PROCEDURE — 88235 TISSUE CULTURE PLACENTA: CPT | Performed by: OBSTETRICS & GYNECOLOGY

## 2023-04-07 PROCEDURE — 76827 ECHO EXAM OF FETAL HEART: CPT | Mod: 26 | Performed by: PEDIATRICS

## 2023-04-07 PROCEDURE — 96040 HC GENETIC COUNSELING, EACH 30 MINUTES: CPT | Performed by: GENETIC COUNSELOR, MS

## 2023-04-07 PROCEDURE — 76811 OB US DETAILED SNGL FETUS: CPT

## 2023-04-07 PROCEDURE — 76946 ECHO GUIDE FOR AMNIOCENTESIS: CPT

## 2023-04-07 PROCEDURE — 87798 DETECT AGENT NOS DNA AMP: CPT | Performed by: OBSTETRICS & GYNECOLOGY

## 2023-04-07 PROCEDURE — 99417 PROLNG OP E/M EACH 15 MIN: CPT | Performed by: OBSTETRICS & GYNECOLOGY

## 2023-04-07 PROCEDURE — 76816 OB US FOLLOW-UP PER FETUS: CPT | Mod: 26 | Performed by: OBSTETRICS & GYNECOLOGY

## 2023-04-07 PROCEDURE — 99215 OFFICE O/P EST HI 40 MIN: CPT | Mod: 25 | Performed by: OBSTETRICS & GYNECOLOGY

## 2023-04-07 PROCEDURE — 93325 DOPPLER ECHO COLOR FLOW MAPG: CPT | Mod: 26 | Performed by: PEDIATRICS

## 2023-04-07 PROCEDURE — 81416 EXOME SEQUENCE ANALYSIS: CPT | Performed by: OBSTETRICS & GYNECOLOGY

## 2023-04-07 PROCEDURE — 99202 OFFICE O/P NEW SF 15 MIN: CPT | Mod: 25 | Performed by: PEDIATRICS

## 2023-04-07 PROCEDURE — 76825 ECHO EXAM OF FETAL HEART: CPT | Mod: 26 | Performed by: PEDIATRICS

## 2023-04-07 PROCEDURE — 76821 MIDDLE CEREBRAL ARTERY ECHO: CPT

## 2023-04-07 PROCEDURE — 81415 EXOME SEQUENCE ANALYSIS: CPT | Mod: XU | Performed by: OBSTETRICS & GYNECOLOGY

## 2023-04-07 NOTE — PROGRESS NOTES
[facility]   Heart Center Fetal Consult Note    Patient:  Paula Hagan MRN:  [id number]   YOB: 1995 Age:  28 year old   Date of Visit:  2023 PCP:  Juan Luis Rivera MD     Dear Doctor,     I had the pleasure of seeing Paula Hagan at the [facility] on 2023 in fetal cardiology consultation for fetal echocardiogram results. She presented today accompanied by her . As you know, she is a 28 year old  at 20w2d who presented for fetal echocardiogram today because of bilateral pleural effusions.    I performed and interpreted the fetal echocardiogram today, which demonstrated normal fetal cardiac anatomy. Normal fetal intracardiac connections. Normal right and left ventricular size and function. Rare ectopic beats; likely conducted premature atrial contractions. Large bilateral pleural effusions. No pericardial effusion.    I reviewed the echo findings today with Paula Hagan and her partner. The rare ectopic beats are likely not the cause of a pleural effusion with no additional evidence of tachyarrhythmia, atrioventricular valve insufficiency, or decreased systolic function. The cause of the pleural effusions is likely non-cardiac. She is aware that the study was within normal limits with no major cardiac abnormalities. She is aware of the general limitations of fetal echocardiography. No additional fetal echocardiograms are recommended. No  cardiac follow-up is required.     Thank you for allowing me to participate in Paula's care. Please do not hesitate to contact me with questions or concerns.    This visit was separate from the performance and interpretation of the ultrasound. The majority of the time (>50%) was spent in counseling and coordination of care. I spent approximately 25 minutes in face-to-face time reviewing the above considerations.    April Valenzuela M.D.  Pediatric Cardiology  [facility]  [facility]'s [address], 5th floor, [facility] [address]  Phone [phone]  Fax [phone]

## 2023-04-07 NOTE — PROGRESS NOTES
[facility]  Genetic Counseling Consult    Patient:  Paula Hagan YOB: 1995   Date of Service:  23      Paula Hagan was seen at the [facility] for genetic consultation for the indication of fetal pleural effusion and previous pregnancy with hydrops fetalis. Paula was accompanied to today's visit by her partner, Milad.        Impression/Plan:   1.  Paula had a genetic consultation today. Today she has elected to pursue genetic amniocentesis and consent was obtained. The following was ordered on the prenatal sample: chromosomal microarray, whole exome sequencing, and infections studies (CMV, parvovirus) by PCR. Results are expected within 3-7 days for the CMV testing and parvovirus testing, within 7-10 days for the microarray, and within 14-21 days for the prenatal [place]. All results will be available in [facility]. We will contact her to discuss the results, and a copy will be forwarded to the referring OB provider. Paula would prefer a vague voicemail regarding results if she cannot be reached.     2.  Paula also underwent another comprehensive ultrasound today. Please see the ultrasound report for additional details.     Pregnancy History:   /Parity:    Age at Delivery: 28 year old  ISSAC: 2023, Alternate ISSAC Entry  Gestational Age: 20w2d    No significant complications or exposures were reported in the current pregnancy.    Paula pizarro pregnancy history is significant for:  o Early SAB  o 2018 full-term delivery, male, alive and well  o 2019 SAB at 18w1d, measuring ~16w. Pregnancy complicated by a first-trimester hygroma with progression to fetal hydrops (anasarca, pleural effusion, abdominal ascites) with demise in the second trimester. Low-risk, male NIPS in this pregnancy. Chart notes state normal chromosome studies, but no record of karyotype having been performed is available for reviewed. Serum  infection studies for parvovirus and toxoplasmosis were IgG and IgM negative. CMV was IgM negative and IgG positive, likely indicating an infection prior to the pregnancy, though avidity was not performed. No specific underlying cause was ever uncovered and no additional genetic studies were performed.   o 2021 full-term delivery, male, alive and well.    Paula's 2019 and 2021 pregnancies were with the father of the current pregnancy, Milad.     Medical History:   Paula has preeclampsia in her last pregnancy and has also had diabetes insipitus. Gestational diabetes can be associated with the development of fetal hydrops. However, she did not develop diabetes in her affected pregnancy.    Family History:   A three-generation pedigree was obtained, and is scanned under the  Media  tab.   The following significant findings were reported by Paula:    Paula has one sister who is alive and well with two healthy children.       Paula's partner, Milad, is 32. He has a history of atrial fibrillation. Of note, his father had a heart attack at 44, his paternal uncle had a heart attack at 34, and his paternal aunt had a heart attack in her 40s. This history could certainly be evidence of an inherited cardiac condition.     Milad has two maternal half-siblings and two paternal half-siblings. His half-sister has had thyroid surgery that was not related to a cancer. Milad' maternal half-brother has three children, one of whom had heart concerns at birth and needed further evaluation, but did not require surgery.      Otherwise, the reported family history is negative for multiple miscarriages, stillbirths, birth defects, intellectual disability, known genetic conditions, and consanguinity.       Carrier Screening:   We discussed that in the absence of fetal diagnostic testing, the couple could consider comprehensive carrier screening as a non-invasive risk assessment. However, this would not be as comprehensive as direct  fetal testing.        Risk Assessment:   This pregnancy has been complicated by bilateral pleural effusion. At this time, the fetus does NOT have hydrops. However, much of our discussion focused on the association between this finding and the couple's previous pregnancy that was affected by hydrops without a known explanation.     We reviewed reasons why hydrops can occur:     If fetal hydrops occurs it can be caused by a cardiac anomaly. It is unknown if the previously affected pregnancy could have had a heart defect, though there were no clear cardiac anomalies present on ultrasound. Paula an echocardiogram in the current pregnancy (today) that was normal.       If fetal hydrops occurs it can be caused by aneuploidy such as monosomy X or trisomy 21. Paula has undergone cell-free DNA screening that returned low-risk for trisomies 21, 18, and 13, as well as the sex chromosome conditions. The residual risk for these conditions is less than 1 in [id number]. In general, aneuploidy may be responsible for approximately 20% of fetal hydrops cases. Chromosomal microarray may have an additional yield of 10-15% over traditional karyotyping by analyzing clinically relevant microdeletion and duplications, such as 22q11.2 microdeletion syndrome.     If fetal hydrops occurs it can be caused by single-gene genetic syndromes such as metabolic disorders and alpha thalassemia. In general, fetal lymphatic accumulation due to a metabolic syndrome has a poor prognosis. Other conditions can include cardiovascular conditions such as congenital arrhythmia and cardiomyopathy conditions, many of which are inherited in a dominant manner. Some studies show that the estimated yield for prenatal whole exome sequencing for fetal hydrops with a normal microarray is up to 25%. However, the number of cases evaluated are small and may include non-isolated cases of fetal hydrops.      If fetal hydrops occurs it can be caused by fetal anemia. This could  "be due to multiple causes:    Maternal isoimmunization such as maternal antibodies against red blood cells antigens such as Rh. A Type and antibody screen was negative which makes this less likely    Parvovirus infection. Some studies estimate that parvovirus is the underlying etiology in up to 5-10% of cases of fetal hydrops.    Genetic syndrome such as alpha thalassemia.      Secondary to fetomaternal transfusion or hemorrhage. Kleihauer- Betke stain has not been ordered. Paula has not experienced bleeding during the pregnancy      If fetal hydrops occurs it can be caused by infection. Parvovirus, cytomegalovirus (CMV), and toxoplasmosis are the most common infections. Paula had serum infection studies in her last pregnancy which were negative, though this cannot rule out the possibility of an infection in the current pregnancy.        Testing Options:     Paula has elected to pursue genetic amniocentesis today.    Genetic Amniocentesis  - This is an invasive procedure typically performed at 15 weeks or later, through which amniotic fluid is obtained for the purpose of chromosome analysis and/or other prenatal genetic analysis.  - The timing and option of amniocentesis is dependent on the fusion of the chorionic and amniotic membranes. This fusion typically occurs around 15-16 weeks gestation. In the case of aneuploidy, this fusion can be delayed.  - Amniocentesis is considered a diagnostic test for chromosome problems during pregnancy.  - The risk of pregnancy loss associated with amniocentesis is generally estimated to be 1/500 or less.  - Amniotic fluid AFP measurement can be done to screen for the possibility of open neural tube or ventral defects.        We reviewed the amniocentesis procedure consent form as well as the genetic testing consent form. Both can be found scanned in the patient's chart under the \"Media\" tab. The following was ordered on the prenatal sample: chromosomal microarray and prenatal " whole exome sequencing through [employer], CMV quantitative analysis through [employer], and parvovirus PCR through [employer].     Microarray testing involves looking for small extra (duplications) or missing (deletions) pieces of DNA within the chromosomes.  Chromosomal deletions and duplications may cause problems with an individual's health and development including learning disabilities, developmental delays, growth issues, physical differences, and psychiatric challenges. The specific symptoms would depend on the size and gene content of the specific chromosomal region involved.  Microarray testing can also identify whether there are areas within a pair of chromosomes that are too similar to each other, which could indicate that the individual's parents may be related to each other such as cousins, or could represent a phenomenon known as uniparental disomy (UPD) which is where an individual inherits more of a specific chromosome region from one parent than the other parent.  Depending on the chromosome(s) involved, this  genetic similarity  can sometimes lead to growth, developmental and/or physical problems for the individual. Sometimes a microarray can uncover additional incidental findings, such as carrier status, that may require further evaluation.     Prenatal Whole Exome Sequencing (CHINA) through [facility] allows us to analyze the exons (protein-coding regions), but not the introns (non-coding regions) of all of our genes, using the ultrasound findings as a guide. CHINA differs from a multi-gene panel as the lab reports on the variants with the best fit for the clinical indication instead of the clinician picking a group of genes they believe to be the best fit.The [facility] prenatal whole exome also includes exome-wide copy number variant analysis and is validated to identify aneuploidy, triploidy, and known microdeletions and microduplications. Benefits include finding an underlying genetic  etiology for the ultrasound findings, streamlining medical care after delivery if the pregnancy is ongoing, and helping to provide reproductive planning information for the family. Challenges include the high rate of uncertain results, receiving unexpected results for which a family is unprepared (this is likely a low chance), and the continued possibility of a genetic etiology even with negative results. We discussed that future contributions to the human genetic database can sometimes impact the interpretation of a variant. If the pregnancy continues to term, [email or web address] offers a free re-interpretation of results for three years after the initial report if there is anything else that contributes to the baby's phenotype after delivery.    Prenatal exome through [facility] can also include analysis of the ACMG secondary findings. Mutations in these genes are not expected to have overlap with the prenatal phenotype. The couple opted into the ACMG secondary findings.    [facility] does offer the option to report on any pathogenic or likely pathogenic variants detected in genes unrelated to the fetal clinical features, but that are associated with moderate to severe childhood onset disorders. Many of these disorders, especially those associated with nonsyndromic intellectual disability, neurodevelopmental disorders, and metabolic conditions may not present with ultrasound anomalies. If the family opts in to childhood onset conditions, they will be reported as secondary findings and only pathogenic or likely pathogenic variants will be reported. The couple opted into the childhood onset conditions.    We discussed that findings that are part of these groups of conditions could have an impact on the couple's other children.      Turnaround time is ~14 days and can be billed through insurance.     [email or web address] will report on variants in genes known to be associated with the phenotype and variants in genes  possibly associated with the phenotype as primary findings. We reviewed the benefits, limitations, and possible results from a microarray or whole exome sequencing which can include:    Negative: No clinically significant deletions or duplications were identified. No disease-causing variants were identified through exome sequencing. A negative result would not completely rule out a possible genetic cause for the clinical presentation in the pregnancy.  We reviewed that ES will not look at every part of the genome that can cause disease.  In addition, not all the exons that are targeted by ES will be covered or evaluated at a high enough level to accurately detect a disease-causing mutation.  There are also limits to the types of disease-causing gene mutations that ES can detect.  It is possible that a genetic cause exists for the prenatal presentation but is not detected by this test.    Positive: A deletion, duplication, or genetic similarity was identified that may be associated with a particular set of symptoms or known syndrome. A pathogenic or likely pathogenic variant or variants were identified in a gene or genes through exome sequencing. This type of result provides us with an answer and can also guide conversations about recurrence risk. We discussed recurrence risk if a recessive condition is identified, if an autosomal dominant condition is identified, and if an x-linked condition is identified.    Variant of uncertain significance (VUS): A deletion or duplication was identified, but it is not known if it explains the clinical features or it is is normal variation. A variant or variants were found in one or multiple genes through exome sequencing, but the lab does not yet have enough information to classify this variant/these variants as pathogenic or benign (not disease causing).     We discussed that samples for Paula and Milad will be included in the analysis to help determine if genetic changes that  "are found are disease-causing mutations or benign variation, and to prove phase. Only changes that are identified in the fetus will be tested for in Paula and Milad. Variants associated with childhood onset disorders will be reported by parent of origin. We reviewed that it is possible we could identify a variant in the fetus that, if detected in Paula and Milad, could have implications for their own health. We also discussed that sometimes findings warrant additional testing. For example, if the fetus is identified to be heterozygous for a recessive condition, we could coordinate carrier screening for the parent from whom the variant was not inherited.      Genetic Information Nondiscrimination Act  There is a federal law in place, The Genetic Information Nondiscrimination Act or TORI (2008), that protects against health insurance and employment discrimination.  Health insurance protections do not apply to members of the [place]  who receive care through [employer], veterans receiving care through the VA, the [employer], or federal employees who receive care through Federal Employees Health Benefits Plan. Employers may not discriminate (hiring, firing, promotions etc.), based on genetic information. This only applies to companies with 15 or more employees. It does not apply to federal employees, or , which have their own nondiscrimination protections in place. Employers may have \"voluntary\" health services such as employee wellness programs that request genetic information or family history, which is not a violation of TORI. We discussed that there are insurance implications related to these findings in terms of life, short-term disability, and long-term disability insurance.    These results will be available in [facility].  We will contact her to discuss the results, and a copy will be forwarded to the office of the referring OB provider.    We reviewed the benefits and limitations of this " testing.  Screening tests provide a risk assessment specific to the pregnancy for certain fetal chromosome abnormalities, but cannot definitively diagnose or exclude a fetal chromosome abnormality.  Follow-up genetic counseling and consideration of diagnostic testing is recommended with any abnormal screening result.     Diagnostic tests carry inherent risks- including risk of miscarriage- that require careful consideration.  These tests can detect fetal chromosome abnormalities with greater than 99% certainty.  Results can be compromised by maternal cell contamination or mosaicism, and are limited by the resolution of cytogenetic G-banding technology.  There is no screening nor diagnostic test that can detect all forms of birth defects or mental disability.     It was a pleasure to be involved with [facility]. Face-to-face time of the meeting was 45 minutes.      Francine Gavin MS, [facility]  Licensed Genetic Counselor  [facility]  Maternal Fetal Medicine  [email or web address]  [phone]

## 2023-04-07 NOTE — PATIENT INSTRUCTIONS
As part of your visit in Maternal Fetal Medicine, you elected to have a genetic amniocentesis, an invasive diagnostic procedure. The following information was discussed.:  Amniocentesis is an invasive test that can diagnose these types of structural chromosome abnormalities with greater than 99% accuracy.  In addition, amniotic fluid alpha fetoprotein levels would be measured to screen for neural tube and abdominal wall defects.    The risk of pregnancy loss association with amniocentesis is generally estimated to be 1/500  or less.  Mild cramping is very common. It usually goes away within a few hours. You may take Acetaminophen (Tylenol ) for this if needed  Avoid strenuous activities for the rest of the day after the Amniocentesis is done. This includes heavy lifting, jogging or other exercise.  You should call your regular obstetric (OB) care provider if you have:  Heavy bleeding  Clear fluid (like water) leaking from your vagina  Severe abdominal (belly) pain  Flu-like symptoms within two weeks of the test. These include chills, muscle aches or a fever over 100.4 F (38 C) (under the tongue).  The following testing was ordered on the amniotic fluid sample:  Microarray analysis taking 7-10 days  Prenatal whole exome sequencing taking 2-3 weeks  CMV and parvovirus PCR taking ~1 week  Results are not guaranteed  Results will be communicated to you, forwarded to your primary OB provider, and available in [employer].

## 2023-04-07 NOTE — PROGRESS NOTES
Permission was requested and granted from the patient to discuss the following topics:    We discussed the findings on today's ultrasound with the patient. We reviewed the limitations of ultrasound to detect all fetal structural abnormalities. Ultrasound will detect approximately 80-90% of all fetal structural abnormalities. Limitations are for those not evident on scan such as spina bifida occulta or abnormalities that may develop over time such as aortic coarctation or cerebral ventriculomegaly.    We discussed the findings from today's exam that shows a minimal increase in the size of the pleural effusion. We discussed the recommendations for management which include option for drainage of the pleural effusion if the effusion increases as this could compromise venous return with development of hydrops and impair normal fetal lung development. A thoracocentesis in the event of progression would allow assessment of the pleural/amniotic fluid to confirm the diagnosis of chylothorax, the most common cause of pleural effusion, or a genetic conditions associated with pleural effusion such as metabolic disorders or Plymouth's syndrome. It would also allow us to evaluate the rate of redevelopment of the effusion, since as many as 30-50% of effusions do not recur after drainage. If the pleural effusion recurs and is the same of worse within 3-4 days, recommend placement of a pleuro-amniotic shunt.  The benefits of shunting are to prevent compression on the lungs which can be associated with some degree of pulmonary hypoplasia, and to prevent increasing intrathoracic pressure which could compromise cardiac output and lead to worsening of effusions and development of ascites or subcutaneous edema.      The natural history of pleural effusions that develop rapidly include  birth and stillbirth.  delivery often indicated for non-reassuring status among fetuses with hydrops is associated with a poor prognosis  with over 50% loss rate. Prevention of progression of the effusion and prevention of can improve  outcomes for over 70% of cases. The placement of the shunt is associated with 2-5% risk for PROM and in 30-40% of cases shunt replacement is required for displacement or malfunction.    We discussed the availability of amniocentesis for the precise diagnosis of chromosomal abnormalities. Patient consents to proceed with meeting with our genetic counselor and have NIPT for aneuploidy risk assessment.     The amniocentesis was performed without complications. Fluid sent for CHINA and for CMV/Parvovirus PCR.    Continue  surveillance with fetal growth scans every 4 and fetal surveillance with once weekly assessment of the effusion. The growth scans will be with M and the weekly assessment of the effusion will be through telemedicine.    Discussed with PCP.    Patient is aware and understands why she has come for her ultrasound today and states that she feels that all of her questions have been answered to her satisfaction.    Thank you for the opportunity to participate in the care of this patient.  If you have questions regarding today's evaluation or if we can be of further service, please contact the Maternal-Fetal Medicine Center.    **Fetal anomalies may be present but not detected*    The patient had all her questions answered. She voiced understanding of the plan of care and her satisfaction with our care today. Thank you for the opportunity to participate in the care of Ms. Hagan. Please do not hesitate to contact us if you may have any questions or concerns.       I spent 10 minutes prior to the visit preparing to see the patient (reviewing medical records and tests).  I spent 40 minutes face-face-to-face with the patient during the visit with the majority (>50%) spent on counseling and coordination of care with the patient and/or family members.  I spent 20 minutes after the visit with the  patient documenting the visit in the electronic health record and/or communicating with other health care professionals and/or care coordination.      Total time spent on today s date of service: 70 minutes.

## 2023-04-07 NOTE — NURSING NOTE
Pt here for amniocentesis d/t bilateral pleural effusions. Saw [facility], see their dictation.  After consent signed and TimeOut completed, Dr. Santos withdrew adequate fluid x1 transabdominal pass.    Patient reports minimal pain.  Pt is RH positive.  MD reviewed blood type and screen and Rhogam not indicated. Discharge teaching completed and questions answered.  Pt discharged ambulatory and stable.   Lab alerted by calling phone number on amnio work-aid for [facility].  [facility] notified of specimen.  Specimen transported to main lab, warm hand-off completed.

## 2023-04-10 ENCOUNTER — TELEPHONE (OUTPATIENT)
Dept: MATERNAL FETAL MEDICINE | Facility: CLINIC | Age: 28
End: 2023-04-10
Payer: COMMERCIAL

## 2023-04-10 DIAGNOSIS — O35.CXX0 PLEURAL EFFUSION, FETAL, AFFECTING CARE OF MOTHER, ANTEPARTUM: Primary | ICD-10-CM

## 2023-04-10 LAB — SCANNED LAB RESULT: NORMAL

## 2023-04-10 NOTE — TELEPHONE ENCOUNTER
April 10, 2023    Left VM for Paula regarding the CMV quantitative PCR performed on amniotic fluid.     Results are NEGATIVE/NOT DETECTED, indicating that there is no active CMV infection that could be associated with the fetal pleural effusion.     Francine Gavin MS, [facility]  Licensed Genetic Counselor  [facility]  Maternal Fetal Medicine  [email or web address]  [phone]

## 2023-04-11 ENCOUNTER — TELEPHONE (OUTPATIENT)
Dept: MATERNAL FETAL MEDICINE | Facility: CLINIC | Age: 28
End: 2023-04-11
Payer: COMMERCIAL

## 2023-04-11 ENCOUNTER — HOSPITAL ENCOUNTER (OUTPATIENT)
Dept: ULTRASOUND IMAGING | Facility: OTHER | Age: 28
Discharge: HOME | End: 2023-04-11
Attending: OBSTETRICS & GYNECOLOGY | Admitting: OBSTETRICS & GYNECOLOGY
Payer: COMMERCIAL

## 2023-04-11 DIAGNOSIS — O35.CXX0 PLEURAL EFFUSION, FETAL, AFFECTING CARE OF MOTHER, ANTEPARTUM: ICD-10-CM

## 2023-04-11 LAB — B19V DNA SER QL NAA+PROBE: NOT DETECTED

## 2023-04-11 PROCEDURE — 76815 OB US LIMITED FETUS(S): CPT

## 2023-04-11 PROCEDURE — 76816 OB US FOLLOW-UP PER FETUS: CPT

## 2023-04-11 NOTE — TELEPHONE ENCOUNTER
April 11, 2023    Spoke with Paula regarding the parvovirus qualitative PCR performed on amniotic fluid.      Results are NEGATIVE/NOT DETECTED, indicating that there is no active parvovirus infection that could be associated with the fetal pleural effusion.     Francine Gavin MS, [facility]  Licensed Genetic Counselor  [facility]  Maternal Fetal Medicine  [email or web address]  [phone]

## 2023-04-12 ENCOUNTER — TELEPHONE (OUTPATIENT)
Dept: MATERNAL FETAL MEDICINE | Facility: CLINIC | Age: 28
End: 2023-04-12
Payer: COMMERCIAL

## 2023-04-12 DIAGNOSIS — O35.CXX0 PLEURAL EFFUSION, FETAL, AFFECTING CARE OF MOTHER, ANTEPARTUM: Primary | ICD-10-CM

## 2023-04-12 NOTE — TELEPHONE ENCOUNTER
Called to discuss review results of fetal US which shows slight progression with a small amount of free fluid in the fetal pelvis.     Plan is for patient to have repeat scan next week in [place], and to come to [facility] in 1 week for evaluation prior to thoracocentesis.    Abrahan Santos M.D.  Maternal Fetal Medicine

## 2023-04-17 ENCOUNTER — TELEPHONE (OUTPATIENT)
Dept: MATERNAL FETAL MEDICINE | Facility: CLINIC | Age: 28
End: 2023-04-17
Payer: COMMERCIAL

## 2023-04-17 NOTE — TELEPHONE ENCOUNTER
April 17, 2023    I called Paula to discuss the results of the chromosomal microarray performed on amniocytes.     Results were consistent with a NORMAL male array pattern. There were no clinically significant copy number changes or regions of homozygosity detected. Maternal cell contamination studies were negative; a single fetal genotype was detected.     Paula understands that a normal microarray cannot rule out all genetic conditions.     The prenatal whole exome report is currently being prepared by the lab. I am hoping to have results back before the end of the week. Paula shared that she is returning to [facility] this Thursday to consider proceeding with thoracentesis. I will plan to touch base with the couple then.     I encouraged her to reach out to me with any questions or concerns.     Francine Gavin MS, [facility]  Licensed Genetic Counselor  [facility]  Maternal Fetal Medicine  [email or web address]  [phone]

## 2023-04-18 ENCOUNTER — TELEPHONE (OUTPATIENT)
Dept: MATERNAL FETAL MEDICINE | Facility: CLINIC | Age: 28
End: 2023-04-18
Payer: COMMERCIAL

## 2023-04-18 ENCOUNTER — HOSPITAL ENCOUNTER (OUTPATIENT)
Dept: ULTRASOUND IMAGING | Facility: OTHER | Age: 28
Discharge: HOME | End: 2023-04-18
Attending: OBSTETRICS & GYNECOLOGY | Admitting: OBSTETRICS & GYNECOLOGY
Payer: COMMERCIAL

## 2023-04-18 ENCOUNTER — PREP FOR PROCEDURE (OUTPATIENT)
Dept: MATERNAL FETAL MEDICINE | Facility: CLINIC | Age: 28
End: 2023-04-18
Payer: COMMERCIAL

## 2023-04-18 DIAGNOSIS — O35.CXX0 PLEURAL EFFUSION, FETAL, AFFECTING CARE OF MOTHER, ANTEPARTUM: ICD-10-CM

## 2023-04-18 LAB
Lab: NORMAL
PERFORMING LABORATORY: NORMAL
SCANNED LAB RESULT: NORMAL
SCANNED LAB RESULT: NORMAL
SPECIMEN STATUS: NORMAL
TEST NAME: NORMAL

## 2023-04-18 PROCEDURE — 76816 OB US FOLLOW-UP PER FETUS: CPT

## 2023-04-18 NOTE — TELEPHONE ENCOUNTER
April 18, 2023    Called Paula to discuss the results of the prenatal whole exome sequencing performed on amniocytes.     Results are NEGATIVE. No clinically significant variants were identified to explain the fetal phenotype. This does not completely eliminate the possibility of a genetic etiology, but does significantly reduce the likelihood of a Mendelian disease.     There were no secondary findings identified.    Although this does not give us an explanation for what is happening in the current pregnancy and does not serve to link this pregnancy to the presentation in the pregnancy with hydrops, Paula shared that she does feel reassured as she prepares for the thoracentesis tomorrow. We discussed specifically that it is good news that no metabolic conditions were identified as lymphatic buildup or hydrops in those cases will typically return even with treatment.     I encouraged Paula to reach out with any questions or concerns going forward.     Francine Gavin MS, [facility]  Licensed Genetic Counselor  [facility]  Maternal Fetal Medicine  [email or web address]  [phone]

## 2023-04-18 NOTE — TELEPHONE ENCOUNTER
Phone call to Paula regarding procedure on 4/19 in [facility] [place]. Pt should arrive at 1230pm. Eating and drinking guidelines reviewed. Showering recommendations reviewed. Email with address and above mentioned information sent to patient.     Radha Paz RN

## 2023-04-19 ENCOUNTER — HOSPITAL ENCOUNTER (INPATIENT)
Facility: CLINIC | Age: 28
LOS: 1 days | Discharge: HOME | End: 2023-04-21
Attending: OBSTETRICS & GYNECOLOGY | Admitting: OBSTETRICS & GYNECOLOGY
Payer: COMMERCIAL

## 2023-04-19 ENCOUNTER — APPOINTMENT (OUTPATIENT)
Dept: ULTRASOUND IMAGING | Facility: CLINIC | Age: 28
End: 2023-04-19
Payer: COMMERCIAL

## 2023-04-19 DIAGNOSIS — Z98.890 STATUS POST THORACENTESIS: Primary | ICD-10-CM

## 2023-04-19 LAB
ABO/RH(D): NORMAL
ANTIBODY SCREEN: NEGATIVE
APPEARANCE FLD: ABNORMAL
APPEARANCE FLD: ABNORMAL
CELL COUNT BODY FLUID SOURCE: ABNORMAL
CELL COUNT BODY FLUID SOURCE: ABNORMAL
COLOR FLD: ABNORMAL
COLOR FLD: YELLOW
ERYTHROCYTE [DISTWIDTH] IN BLOOD BY AUTOMATED COUNT: 13.8 % (ref 10–15)
HCT VFR BLD AUTO: 37.7 % (ref 35–47)
HGB BLD-MCNC: 12.5 G/DL (ref 11.7–15.7)
MCH RBC QN AUTO: 31.3 PG (ref 26.5–33)
MCHC RBC AUTO-ENTMCNC: 33.2 G/DL (ref 31.5–36.5)
MCV RBC AUTO: 95 FL (ref 78–100)
PLATELET # BLD AUTO: 174 10E3/UL (ref 150–450)
RBC # BLD AUTO: 3.99 10E6/UL (ref 3.8–5.2)
SPECIMEN EXPIRATION DATE: NORMAL
TRIGL FLD-MCNC: <9 MG/DL
TRIGL FLD-MCNC: <9 MG/DL
TRIGLYCERIDE BODY FLUID SOURCE: NORMAL
TRIGLYCERIDE BODY FLUID SOURCE: NORMAL
WBC # BLD AUTO: 5.9 10E3/UL (ref 4–11)
WBC # FLD AUTO: 1045 /UL
WBC # FLD AUTO: 1575 /UL

## 2023-04-19 PROCEDURE — G0378 HOSPITAL OBSERVATION PER HR: HCPCS

## 2023-04-19 PROCEDURE — 89050 BODY FLUID CELL COUNT: CPT | Performed by: OBSTETRICS & GYNECOLOGY

## 2023-04-19 PROCEDURE — 59074 FETAL FLUID DRAINAGE W/US: CPT | Mod: GC | Performed by: OBSTETRICS & GYNECOLOGY

## 2023-04-19 PROCEDURE — 272N000001 HC OR GENERAL SUPPLY STERILE: Performed by: OBSTETRICS & GYNECOLOGY

## 2023-04-19 PROCEDURE — 360N000074 HC SURGERY LEVEL 1, PER MIN: Performed by: OBSTETRICS & GYNECOLOGY

## 2023-04-19 PROCEDURE — 76815 OB US LIMITED FETUS(S): CPT | Mod: 26 | Performed by: OBSTETRICS & GYNECOLOGY

## 2023-04-19 PROCEDURE — 250N000013 HC RX MED GY IP 250 OP 250 PS 637: Performed by: STUDENT IN AN ORGANIZED HEALTH CARE EDUCATION/TRAINING PROGRAM

## 2023-04-19 PROCEDURE — 10903ZB DRAINAGE OF OTHER FETAL FLUID FROM PRODUCTS OF CONCEPTION, PERCUTANEOUS APPROACH: ICD-10-PCS | Performed by: OBSTETRICS & GYNECOLOGY

## 2023-04-19 PROCEDURE — 86850 RBC ANTIBODY SCREEN: CPT | Performed by: STUDENT IN AN ORGANIZED HEALTH CARE EDUCATION/TRAINING PROGRAM

## 2023-04-19 PROCEDURE — 84478 ASSAY OF TRIGLYCERIDES: CPT | Performed by: OBSTETRICS & GYNECOLOGY

## 2023-04-19 PROCEDURE — 85027 COMPLETE CBC AUTOMATED: CPT | Performed by: STUDENT IN AN ORGANIZED HEALTH CARE EDUCATION/TRAINING PROGRAM

## 2023-04-19 PROCEDURE — 59074 FETAL FLUID DRAINAGE W/US: CPT

## 2023-04-19 RX ORDER — ASPIRIN 81 MG/1
81 TABLET ORAL DAILY
Status: DISCONTINUED | OUTPATIENT
Start: 2023-04-19 | End: 2023-04-21 | Stop reason: HOSPADM

## 2023-04-19 RX ORDER — PRENATAL VIT/IRON FUM/FOLIC AC 27MG-0.8MG
1 TABLET ORAL DAILY
Status: DISCONTINUED | OUTPATIENT
Start: 2023-04-19 | End: 2023-04-21 | Stop reason: HOSPADM

## 2023-04-19 RX ORDER — PRENATAL VIT/IRON FUM/FOLIC AC 27MG-0.8MG
1 TABLET ORAL DAILY
Status: DISCONTINUED | OUTPATIENT
Start: 2023-04-20 | End: 2023-04-20

## 2023-04-19 RX ORDER — DOCUSATE SODIUM 100 MG/1
100 CAPSULE, LIQUID FILLED ORAL 2 TIMES DAILY PRN
Status: DISCONTINUED | OUTPATIENT
Start: 2023-04-19 | End: 2023-04-21 | Stop reason: HOSPADM

## 2023-04-19 RX ORDER — VECURONIUM BROMIDE 1 MG/ML
0.04 INJECTION, POWDER, LYOPHILIZED, FOR SOLUTION INTRAVENOUS ONCE
Status: DISCONTINUED | OUTPATIENT
Start: 2023-04-19 | End: 2023-04-19

## 2023-04-19 RX ORDER — DIPHENHYDRAMINE HCL 25 MG
25 CAPSULE ORAL EVERY 6 HOURS PRN
Status: DISCONTINUED | OUTPATIENT
Start: 2023-04-19 | End: 2023-04-21 | Stop reason: HOSPADM

## 2023-04-19 RX ORDER — METOCLOPRAMIDE 10 MG/1
10 TABLET ORAL EVERY 6 HOURS PRN
Status: DISCONTINUED | OUTPATIENT
Start: 2023-04-19 | End: 2023-04-21 | Stop reason: HOSPADM

## 2023-04-19 RX ORDER — METOCLOPRAMIDE HYDROCHLORIDE 5 MG/ML
10 INJECTION INTRAMUSCULAR; INTRAVENOUS EVERY 6 HOURS PRN
Status: DISCONTINUED | OUTPATIENT
Start: 2023-04-19 | End: 2023-04-21 | Stop reason: HOSPADM

## 2023-04-19 RX ORDER — DIPHENHYDRAMINE HYDROCHLORIDE 50 MG/ML
25 INJECTION INTRAMUSCULAR; INTRAVENOUS EVERY 6 HOURS PRN
Status: DISCONTINUED | OUTPATIENT
Start: 2023-04-19 | End: 2023-04-21 | Stop reason: HOSPADM

## 2023-04-19 RX ORDER — ALBUTEROL SULFATE 90 UG/1
1-2 AEROSOL, METERED RESPIRATORY (INHALATION) EVERY 4 HOURS PRN
Status: DISCONTINUED | OUTPATIENT
Start: 2023-04-19 | End: 2023-04-21 | Stop reason: HOSPADM

## 2023-04-19 RX ORDER — PROCHLORPERAZINE 25 MG
25 SUPPOSITORY, RECTAL RECTAL EVERY 12 HOURS PRN
Status: DISCONTINUED | OUTPATIENT
Start: 2023-04-19 | End: 2023-04-21 | Stop reason: HOSPADM

## 2023-04-19 RX ORDER — ONDANSETRON 2 MG/ML
4 INJECTION INTRAMUSCULAR; INTRAVENOUS EVERY 6 HOURS PRN
Status: DISCONTINUED | OUTPATIENT
Start: 2023-04-19 | End: 2023-04-21 | Stop reason: HOSPADM

## 2023-04-19 RX ORDER — ONDANSETRON 4 MG/1
4 TABLET, ORALLY DISINTEGRATING ORAL EVERY 6 HOURS PRN
Status: DISCONTINUED | OUTPATIENT
Start: 2023-04-19 | End: 2023-04-21 | Stop reason: HOSPADM

## 2023-04-19 RX ORDER — ACETAMINOPHEN 325 MG/1
650 TABLET ORAL EVERY 4 HOURS PRN
Status: DISCONTINUED | OUTPATIENT
Start: 2023-04-19 | End: 2023-04-21 | Stop reason: HOSPADM

## 2023-04-19 RX ORDER — PROCHLORPERAZINE MALEATE 5 MG
10 TABLET ORAL EVERY 6 HOURS PRN
Status: DISCONTINUED | OUTPATIENT
Start: 2023-04-19 | End: 2023-04-21 | Stop reason: HOSPADM

## 2023-04-19 RX ADMIN — PRENATAL VITAMINS-IRON FUMARATE 27 MG IRON-FOLIC ACID 0.8 MG TABLET 1 TABLET: at 19:16

## 2023-04-19 RX ADMIN — ASPIRIN 81 MG: 81 TABLET, COATED ORAL at 19:16

## 2023-04-19 ASSESSMENT — ACTIVITIES OF DAILY LIVING (ADL)
ADLS_ACUITY_SCORE: 18
ADLS_ACUITY_SCORE: 18
DOING_ERRANDS_INDEPENDENTLY_DIFFICULTY: NO
ADLS_ACUITY_SCORE: 31
ADLS_ACUITY_SCORE: 18
CHANGE_IN_FUNCTIONAL_STATUS_SINCE_ONSET_OF_CURRENT_ILLNESS/INJURY: NO
DRESSING/BATHING_DIFFICULTY: NO
ADLS_ACUITY_SCORE: 18
WALKING_OR_CLIMBING_STAIRS_DIFFICULTY: NO
WEAR_GLASSES_OR_BLIND: NO
ADLS_ACUITY_SCORE: 31
FALL_HISTORY_WITHIN_LAST_SIX_MONTHS: NO
CONCENTRATING,_REMEMBERING_OR_MAKING_DECISIONS_DIFFICULTY: NO
DIFFICULTY_EATING/SWALLOWING: NO
TOILETING_ISSUES: NO

## 2023-04-19 NOTE — PROGRESS NOTES
Pt and spouse Milad to Antepartum for a fetal thoracentesis. Oriented to the room.  FHR per sree 145 irregular.

## 2023-04-19 NOTE — H&P
[facility]  OB History and Physical      Paula Hagan MRN# [id number]   Age: 28 year old YOB: 1995     CC:  Fetal thoracentesis    HPI:  Ms. Paula Hagan is a 28 year old  at 22w0d by LMP c/w 10 week sono who presents for fetal thoracentesis.  She denies contractions, vaginal bleeding, and loss of fluid.  Normal fetal movement.    The fetus was noted to have fetal bilateral pleural effusions on her anatomy US.  She underwent amniocentesis for chromosomal microarray, whole exome sequencing, and for CMV/Parvovirus PCR.  The above workup was negative.  Follow up US at 21w6d with increasing volume of the left pleural effusion with new thoracic skin thickening and arrhythmia.  Thus she presented today for fetal bilateral thoracentesis.  She was counseled on the risks of the procedure including PPROM,  labor, contractions, placental abruption, and infection.  Risks, benefits, and alternatives to the procedure were discussed with the patient who elected to proceed.   All questions were answered and an informed consent was obtained.    Pregnancy Complications:  1.  Fetal bilateral pleural effusions  2.  Prior  section  3.  History of cystic hygroma with 18 week fetal demise in prior pregnancy      OB History  OB History    Para Term  AB Living   4 2 2 0 1 2   SAB IAB Ectopic Multiple Live Births   0 0 0 0 2      # Outcome Date GA Lbr Femi/2nd Weight Sex Delivery Anes PTL Lv   4 Current            3 Term 21 37w1d  3.189 kg (7 lb 0.5 oz) M CS-LTranv   LES      Complications: Preeclampsia/Hypertension      Name: THERESA ARGUETA      Apgar1: 8  Apgar5: 8   2 AB 19 18w1d   M          Birth Comments: Fetal Hydrops   1 Term 18   3.201 kg (7 lb 0.9 oz) M CS-LVertical Gen  LES      Complications: Fetal Intolerance, Failure to Progress in First Stage      Name: ERIC ARGUETA      Apgar1: 7  Apgar5: 8       PMHx:   Past Medical History:    Diagnosis Date     Gestational diabetes      History of diabetes insipidus     in first pregnancy     History of diet controlled gestational diabetes mellitus (GDM)     first pregnancy     History of pre-eclampsia     first pregnancy     PID (pelvic inflammatory disease)      Uncomplicated asthma      Varicella      PSHx:   Past Surgical History:   Procedure Laterality Date      SECTION  2018      SECTION N/A 2021    Procedure:  SECTION;  Surgeon: Seth Morfin MD;  Location:  OR     Meds:   Medications Prior to Admission   Medication Sig Dispense Refill Last Dose     albuterol (PROAIR HFA/PROVENTIL HFA/VENTOLIN HFA) 108 (90 Base) MCG/ACT inhaler Inhale 1-2 puffs into the lungs every 4 hours as needed   More than a month     aspirin (ASA) 81 MG EC tablet Take 1 tablet (81 mg) by mouth daily   2023     EPINEPHrine (ANY BX GENERIC EQUIV) 0.3 MG/0.3ML injection 2-pack Inject 0.3 mg into the muscle   More than a month     hydrOXYzine (ATARAX) 25 MG tablet Take 1 tablet by mouth every 6 hours as needed  1 More than a month     metoclopramide (REGLAN) 10 MG tablet Take 1 tablet (10 mg) by mouth 4 times daily as needed (hyperemesis) 30 tablet 1 2023     Prenatal Vit-Fe Fumarate-FA (PRENATAL MULTIVITAMIN W/IRON) 27-0.8 MG tablet Take 1 tablet by mouth daily   2023     Allergies:    Allergies   Allergen Reactions     Bee Venom Anaphylaxis     Morphine Itching and Hives     Tolerates HYDROmorphone (DILUDID)  Tolerates HYDROmorphone (DILUDID)     Buspirone Other (See Comments)     Headache, migraine  migraines     Sertraline Muscle Pain (Myalgia)      FmHx: History reviewed. No pertinent family history.     SocHx:  She denies any tobacco, alcohol, or other drug use during this pregnancy.    ROS:   As per HPI, otherwise negative.    PE:  Vit:   Patient Vitals for the past 4 hrs:   BP Temp Temp src Pulse Resp   23 1458 113/72 98.4  F (36.9  C) Oral -- --   23  1228 108/75 -- -- 80 --   23 1227 -- 98.2  F (36.8  C) Oral -- 16      Gen: Alert, oriented, appears comfortable  CV: Regular rate and rhythm  Pulm: Clear to auscultation bilateral fields  Abd: Soft, gravid, non-tender  Ext: No edema    Dop tones:  140s    Assessment/Plan  Paula Hagan is a 28 year old  at 22w0d by LMP c/w 10 week sono who presents for fetal thoracentesis.      1. Bilateral fetal pleural effusions   - Fetus was noted to have fetal bilateral pleural effusions on anatomy US.  She underwent amniocentesis for chromosomal microarray, whole exome sequencing, and for CMV/Parvovirus PCR.  The above workup was negative.  Follow up US at 21w6d with increasing volume of the left pleural effusion with new thoracic skin thickening and arrhythmia.  Thus she presented today for fetal bilateral thoracentesis.  She was counseled on the risks of the procedure including PPROM,  labor, contractions, placental abruption, and infection.  Risks, benefits, and alternatives to the procedure were discussed with the patient who elected to proceed.   All questions were answered and an informed consent was obtained.  - Doptones before and after the procedure.    - Surgical consent signed.  - Admit for observation overnight for repeat US in AM; patient lives 4 hours away.    The patient was discussed with Dr. Santos who is in agreement with the treatment plan.    Loida Kerns MD   Maternal-Fetal Medicine Fellow, PGY6  2023 4:11 PM

## 2023-04-19 NOTE — DISCHARGE SUMMARY
[facility] Discharge Summary    Paula Hagan MRN# [id number]   Age: 28 year old YOB: 1995     Date of Admission:  2023  Date of Discharge:  2023    Admitting Physician:  Abrahan Santos MD  Discharge Physician:  Abrahan Santos MD     Admission Diagnosis:  Fetal bilateral pleural effusions  Fetal hydrops    Discharge Diagnosis:  Same s/p bilateral fetal thoracentesis and fetal bilateral thoracoamniotic shunt placement     Procedures:  Bilateral thoracentesis, fetal  Fetal bilateral thoracoamniotic shunt placement   Betamethasone (-)    Consultations:  none    Medications prior to admission:  Medications Prior to Admission   Medication Sig Dispense Refill Last Dose     albuterol (PROAIR HFA/PROVENTIL HFA/VENTOLIN HFA) 108 (90 Base) MCG/ACT inhaler Inhale 1-2 puffs into the lungs every 4 hours as needed   More than a month     aspirin (ASA) 81 MG EC tablet Take 1 tablet (81 mg) by mouth daily   2023     Prenatal Vit-Fe Fumarate-FA (PRENATAL MULTIVITAMIN W/IRON) 27-0.8 MG tablet Take 1 tablet by mouth daily   2023     [DISCONTINUED] EPINEPHrine (ANY BX GENERIC EQUIV) 0.3 MG/0.3ML injection 2-pack Inject 0.3 mg into the muscle   More than a month     [DISCONTINUED] hydrOXYzine (ATARAX) 25 MG tablet Take 1 tablet by mouth every 6 hours as needed  1 More than a month     [DISCONTINUED] metoclopramide (REGLAN) 10 MG tablet Take 1 tablet (10 mg) by mouth 4 times daily as needed (hyperemesis) 30 tablet 1 2023     Brief History of Presentation:  Paula Hagan is a 28 y.o.  at 22w0d x LMP c/w 10 week sono.  She was noted to have fetal bilateral pleural effusions on her anatomy US.  She underwent amniocentesis for chromosomal microarray, whole exome sequencing, and for CMV/Parvovirus PCR.  The above workup was negative.  Follow up US at 21w6d with increasing volume of the left pleural effusion with new thoracic skin thickening and arrhythmia.   Thus she presented today for fetal bilateral thoracentesis.  She was counseled on the risks of the procedure including PPROM,  labor, contractions, placental abruption, and infection.  Risks, benefits, and alternatives to the procedure were discussed with the patient who elected to proceed.   All questions were answered and an informed consent was obtained.    Hospital Course:  Paula underwent bilateral fetal thoracentesis on 23 without complication.  She was admitted overnight for observation.  Unfortunately on 23 the pleural effusions were noted to have reaccumulated.  There was fetal hydrops with trace abdominal/pelvic ascites and scalp edema.  Cell count with differential and triglyceride level was performed on the pleural fluid.  Results did not seem consistent with a chylothorax.  Flow cytometry is pending.  She was counseled on management options and desired to proceed with bilateral fetal thoracoamniotic shunt placement for therapeutic benefit for fetal lungs and also for diagnostic benefit to evaluate for resolution of hydrops after drainage of pleural effusions.  She received 2 doses of  steroids (-).  On 23 she underwent fetal bilateral thoracoamniotic shunt placement under continuous ultrasound guidance.  She tolerated the procedure well without complication.  Prior to discharge US was performed noting both stents functioning well.  Prior to discharge she was ambulating, tolerating PO, voiding, pain controlled.  She will be discharged with 25 mg of Indocin tid x 2 days.  She will follow up with Dr. Morfin early next week for repeat assessment.    Discharge Instructions:  Call or present to labor and delivery if you experience:   -Regular painful contractions concerning for labor   -Leakage of fluid concerning for ruptured membranes   -Decreased fetal movement   -Bright red vaginal bleeding    -Headache, vision changes, upper abdominal pain, significant increase in  swelling,   generalized unwell feeling    Follow up:  Follow up with Dr. Morfin early next week for repeat assessment.      Discharge Medications:  Current Discharge Medication List      START taking these medications    Details   indomethacin (INDOCIN) 25 MG capsule Take 1 capsule (25 mg) by mouth 3 times daily for 2 days  Qty: 6 capsule, Refills: 0    Associated Diagnoses: Status post thoracentesis         CONTINUE these medications which have NOT CHANGED    Details   albuterol (PROAIR HFA/PROVENTIL HFA/VENTOLIN HFA) 108 (90 Base) MCG/ACT inhaler Inhale 1-2 puffs into the lungs every 4 hours as needed      aspirin (ASA) 81 MG EC tablet Take 1 tablet (81 mg) by mouth daily    Associated Diagnoses: History of pre-eclampsia in prior pregnancy, currently pregnant, second trimester      Prenatal Vit-Fe Fumarate-FA (PRENATAL MULTIVITAMIN W/IRON) 27-0.8 MG tablet Take 1 tablet by mouth daily         STOP taking these medications       EPINEPHrine (ANY BX GENERIC EQUIV) 0.3 MG/0.3ML injection 2-pack Comments:   Reason for Stopping:         hydrOXYzine (ATARAX) 25 MG tablet Comments:   Reason for Stopping:         metoclopramide (REGLAN) 10 MG tablet Comments:   Reason for Stopping:               Gauri Brooks MD  OB/GYN, PGY-3  04/21/2023, 3:20 PM

## 2023-04-19 NOTE — OP NOTE
Fetal Thoracentesis Procedure Note     Date of service:  2023     Preoperative diagnosis:    1. Fetal bilateral pleural effusions  2. Low-risk NIPT    Postoperative diagnosis:    Same s/p bilateral fetal thoracentesis    Procedure:  Bilateral fetal thoracentesis    Surgeon:  Abrahan Santos MD  Assistant:  Loida Kerns MD    Anesthesia:  none  Specimens:  fetal bilateral pleural effusions   Complications: none    EBL:  0 mL    Indications:    Paula Hagan is a 28 y.o.  at 22w0d x LMP c/w 10 week sono.  She was noted to have fetal bilateral pleural effusions on her anatomy US.  She underwent amniocentesis for chromosomal microarray, whole exome sequencing, and for CMV/Parvovirus PCR.  The above workup was negative.  Follow up US at 21w6d with increasing volume of the left pleural effusion with new thoracic skin thickening and arrhythmia.  Thus she presented today for fetal bilateral thoracentesis.  She was counseled on the risks of the procedure including PPROM,  labor, contractions, placental abruption, and infection.  Risks, benefits, and alternatives to the procedure were discussed with the patient who elected to proceed.   All questions were answered and an informed consent was obtained.    Procedure:  The patient was brought to the operating room.  Surgical time out was performed.  Her abdomen was then prepped in the typical fashion.  Injection of vecuronium 0.04 mg into the fetal buttock was then performed without difficulty using a 22 gauge needle under continuous US guidance.  Fetal bilateral thoracentesis was then performed without difficulty using a 20 gauge needle under continuous US guidance.  A total of 10 ml of pleural effusion was removed from the bilateral fetal thoracic cavity via a single transplacental insertion.  The specimens were labeled and sent for cell count and differential and triglyceride level.  The specimens were walked over to the lab.  Fetal heart rate  post-procedure was normal.  The patient is Rh + and Rh immune globilin was not indicated.      The patient tolerated the procedure well and was taken to the recovery room in stable condition.      Dr. Santos was scrubbed and present for the entire procedure.    Loida Kerns MD   Maternal-Fetal Medicine Fellow, PGY6  4/19/2023 3:55 PM

## 2023-04-20 ENCOUNTER — ANESTHESIA EVENT (OUTPATIENT)
Dept: OBGYN | Facility: CLINIC | Age: 28
End: 2023-04-20
Payer: COMMERCIAL

## 2023-04-20 ENCOUNTER — APPOINTMENT (OUTPATIENT)
Dept: ULTRASOUND IMAGING | Facility: CLINIC | Age: 28
End: 2023-04-20
Payer: COMMERCIAL

## 2023-04-20 PROCEDURE — 120N000002 HC R&B MED SURG/OB UMMC

## 2023-04-20 PROCEDURE — G0378 HOSPITAL OBSERVATION PER HR: HCPCS

## 2023-04-20 PROCEDURE — 76821 MIDDLE CEREBRAL ARTERY ECHO: CPT

## 2023-04-20 PROCEDURE — 99233 SBSQ HOSP IP/OBS HIGH 50: CPT | Mod: 25 | Performed by: OBSTETRICS & GYNECOLOGY

## 2023-04-20 PROCEDURE — 88184 FLOWCYTOMETRY/ TC 1 MARKER: CPT | Performed by: STUDENT IN AN ORGANIZED HEALTH CARE EDUCATION/TRAINING PROGRAM

## 2023-04-20 PROCEDURE — 76816 OB US FOLLOW-UP PER FETUS: CPT | Mod: 26 | Performed by: OBSTETRICS & GYNECOLOGY

## 2023-04-20 PROCEDURE — 76821 MIDDLE CEREBRAL ARTERY ECHO: CPT | Mod: 26 | Performed by: OBSTETRICS & GYNECOLOGY

## 2023-04-20 PROCEDURE — 250N000013 HC RX MED GY IP 250 OP 250 PS 637: Performed by: STUDENT IN AN ORGANIZED HEALTH CARE EDUCATION/TRAINING PROGRAM

## 2023-04-20 PROCEDURE — 258N000003 HC RX IP 258 OP 636: Performed by: STUDENT IN AN ORGANIZED HEALTH CARE EDUCATION/TRAINING PROGRAM

## 2023-04-20 PROCEDURE — 96372 THER/PROPH/DIAG INJ SC/IM: CPT | Performed by: STUDENT IN AN ORGANIZED HEALTH CARE EDUCATION/TRAINING PROGRAM

## 2023-04-20 PROCEDURE — 96374 THER/PROPH/DIAG INJ IV PUSH: CPT

## 2023-04-20 PROCEDURE — 88189 FLOWCYTOMETRY/READ 16 & >: CPT | Performed by: STUDENT IN AN ORGANIZED HEALTH CARE EDUCATION/TRAINING PROGRAM

## 2023-04-20 PROCEDURE — 250N000011 HC RX IP 250 OP 636: Performed by: STUDENT IN AN ORGANIZED HEALTH CARE EDUCATION/TRAINING PROGRAM

## 2023-04-20 PROCEDURE — 250N000009 HC RX 250: Performed by: STUDENT IN AN ORGANIZED HEALTH CARE EDUCATION/TRAINING PROGRAM

## 2023-04-20 RX ORDER — NALOXONE HYDROCHLORIDE 0.4 MG/ML
0.2 INJECTION, SOLUTION INTRAMUSCULAR; INTRAVENOUS; SUBCUTANEOUS
Status: DISCONTINUED | OUTPATIENT
Start: 2023-04-20 | End: 2023-04-21 | Stop reason: HOSPADM

## 2023-04-20 RX ORDER — SODIUM CHLORIDE, SODIUM LACTATE, POTASSIUM CHLORIDE, CALCIUM CHLORIDE 600; 310; 30; 20 MG/100ML; MG/100ML; MG/100ML; MG/100ML
INJECTION, SOLUTION INTRAVENOUS CONTINUOUS
Status: CANCELLED | OUTPATIENT
Start: 2023-04-20

## 2023-04-20 RX ORDER — NALBUPHINE HYDROCHLORIDE 10 MG/ML
2.5-5 INJECTION, SOLUTION INTRAMUSCULAR; INTRAVENOUS; SUBCUTANEOUS EVERY 6 HOURS PRN
Status: DISCONTINUED | OUTPATIENT
Start: 2023-04-20 | End: 2023-04-21 | Stop reason: HOSPADM

## 2023-04-20 RX ORDER — HYDROMORPHONE HCL IN WATER/PF 6 MG/30 ML
0.2 PATIENT CONTROLLED ANALGESIA SYRINGE INTRAVENOUS EVERY 5 MIN PRN
Status: CANCELLED | OUTPATIENT
Start: 2023-04-20

## 2023-04-20 RX ORDER — HYDROMORPHONE HCL IN WATER/PF 6 MG/30 ML
0.4 PATIENT CONTROLLED ANALGESIA SYRINGE INTRAVENOUS EVERY 5 MIN PRN
Status: CANCELLED | OUTPATIENT
Start: 2023-04-20

## 2023-04-20 RX ORDER — NALOXONE HYDROCHLORIDE 0.4 MG/ML
0.4 INJECTION, SOLUTION INTRAMUSCULAR; INTRAVENOUS; SUBCUTANEOUS
Status: DISCONTINUED | OUTPATIENT
Start: 2023-04-20 | End: 2023-04-21 | Stop reason: HOSPADM

## 2023-04-20 RX ORDER — FENTANYL CITRATE-0.9 % NACL/PF 10 MCG/ML
100 PLASTIC BAG, INJECTION (ML) INTRAVENOUS EVERY 5 MIN PRN
Status: DISCONTINUED | OUTPATIENT
Start: 2023-04-20 | End: 2023-04-21 | Stop reason: HOSPADM

## 2023-04-20 RX ORDER — FENTANYL CITRATE 50 UG/ML
25 INJECTION, SOLUTION INTRAMUSCULAR; INTRAVENOUS EVERY 5 MIN PRN
Status: CANCELLED | OUTPATIENT
Start: 2023-04-20

## 2023-04-20 RX ORDER — FENTANYL CITRATE 50 UG/ML
50 INJECTION, SOLUTION INTRAMUSCULAR; INTRAVENOUS EVERY 5 MIN PRN
Status: CANCELLED | OUTPATIENT
Start: 2023-04-20

## 2023-04-20 RX ORDER — BETAMETHASONE SODIUM PHOSPHATE AND BETAMETHASONE ACETATE 3; 3 MG/ML; MG/ML
12 INJECTION, SUSPENSION INTRA-ARTICULAR; INTRALESIONAL; INTRAMUSCULAR; SOFT TISSUE EVERY 24 HOURS
Status: COMPLETED | OUTPATIENT
Start: 2023-04-20 | End: 2023-04-21

## 2023-04-20 RX ADMIN — ASPIRIN 81 MG: 81 TABLET, COATED ORAL at 08:07

## 2023-04-20 RX ADMIN — BETAMETHASONE SODIUM PHOSPHATE AND BETAMETHASONE ACETATE 12 MG: 3; 3 INJECTION, SUSPENSION INTRA-ARTICULAR; INTRALESIONAL; INTRAMUSCULAR; SOFT TISSUE at 11:28

## 2023-04-20 RX ADMIN — PRENATAL VITAMINS-IRON FUMARATE 27 MG IRON-FOLIC ACID 0.8 MG TABLET 1 TABLET: at 08:07

## 2023-04-20 RX ADMIN — ACETAMINOPHEN 650 MG: 325 TABLET, FILM COATED ORAL at 14:34

## 2023-04-20 RX ADMIN — VECURONIUM BROMIDE 0.04 MG: 1 INJECTION, POWDER, LYOPHILIZED, FOR SOLUTION INTRAVENOUS at 07:56

## 2023-04-20 ASSESSMENT — ACTIVITIES OF DAILY LIVING (ADL)
ADLS_ACUITY_SCORE: 18

## 2023-04-20 NOTE — PROGRESS NOTES
Patient and spouse will wait to meet with the NICU team after they know if the shunting of the lung fluid works or not.  They are tearful and very torn about the direction to take with the baby.  Confused and frustrated that there isn't a known reason at this time as to why the baby has hydrops and lung fluid. Will continue to support patient and spouse.

## 2023-04-20 NOTE — ANESTHESIA PREPROCEDURE EVALUATION
Anesthesia Pre-Procedure Evaluation    Patient: Paula Hagan   MRN: [id number] : 1995        Procedure : Procedure(s):  INSERTION, SHUNT, THORACOAMNIOTIC BILATERAL          Past Medical History:   Diagnosis Date     Gestational diabetes      History of diabetes insipidus     in first pregnancy     History of diet controlled gestational diabetes mellitus (GDM)     first pregnancy     History of pre-eclampsia     first pregnancy     PID (pelvic inflammatory disease)      Uncomplicated asthma      Varicella       Past Surgical History:   Procedure Laterality Date      SECTION  2018      SECTION N/A 2021    Procedure:  SECTION;  Surgeon: Seth Morfin MD;  Location: [facility] OR     INSERT FETAL THORACOAMNIOTIC SHUNT N/A 2023    Procedure: INSERTION, SHUNT, THORACOAMNIOTIC  Plan for thoracentesis only 23. Fluid will be sent to lab. No shunt placement per MD.;  Surgeon: Abrahan Santos MD;  Location: [facility] L+D      Allergies   Allergen Reactions     Bee Venom Anaphylaxis     Morphine Itching and Hives     Tolerates HYDROmorphone (DILUDID)  Tolerates HYDROmorphone (DILUDID)     Buspirone Other (See Comments)     Headache, migraine  migraines     Sertraline Muscle Pain (Myalgia)      Social History     Tobacco Use     Smoking status: Never     Smokeless tobacco: Never   Vaping Use     Vaping status: Never Used   Substance Use Topics     Alcohol use: Not Currently      Wt Readings from Last 1 Encounters:   23 64.9 kg (143 lb)        Anesthesia Evaluation   Pt has had prior anesthetic. Type: Regional, General and OB Labor Epidural.    No history of anesthetic complications       ROS/MED HX  ENT/Pulmonary:     (+) Intermittent, asthma     Neurologic:  - neg neurologic ROS     Cardiovascular: Comment: H/o pre-e, not this pregnancy      METS/Exercise Tolerance:     Hematologic:  - neg hematologic  ROS     Musculoskeletal:       GI/Hepatic:     (+) GERD,      Renal/Genitourinary: Comment: H/o 2 c/s. Alsy h/o fetal demise 18wk hydrops. This 22 wk fetus also w hydrops      Endo: Comment: H/o GDM during prior pregnancy  H/o hypothyroidism during prior pregnancy      Psychiatric/Substance Use:       Infectious Disease:       Malignancy:       Other:   Fetal hydrops   (+) , previous ,         Physical Exam    Airway        Mallampati: I   TM distance: > 3 FB   Neck ROM: full   Mouth opening: > 3 cm    Respiratory Devices and Support         Dental  no notable dental history     (+) Minor Abnormalities - some fillings, tiny chips      Cardiovascular   cardiovascular exam normal       Rhythm and rate: regular and normal     Pulmonary   pulmonary exam normal        breath sounds clear to auscultation           OUTSIDE LABS:  CBC:   Lab Results   Component Value Date    WBC 5.9 2023    WBC 6.2 2023    HGB 12.5 2023    HGB 13.1 2023    HCT 37.7 2023    HCT 37.9 2023     2023     2023     BMP:   Lab Results   Component Value Date     2023     2022    POTASSIUM 3.8 2023    POTASSIUM 3.6 2022    CHLORIDE 104 2023    CHLORIDE 100 2022    CO2 25 2023    CO2 25 2022    BUN 8.0 2023    BUN 11 2022    CR 0.61 2023    CR 1.08 2022    GLC 86 2023    GLC 86 2022     COAGS:   Lab Results   Component Value Date    INR 0.81 2019     POC:   Lab Results   Component Value Date    HCG Negative 2022     HEPATIC:   Lab Results   Component Value Date    ALBUMIN 3.9 2023    PROTTOTAL 6.8 2023    ALT 13 2023    AST 16 2023    ALKPHOS 57 2023    BILITOTAL 1.0 2023     OTHER:   Lab Results   Component Value Date    LYDIA 9.0 2023    TSH 3.04 2023    T4 0.69 2020       Anesthesia Plan    ASA Status:  2   NPO Status:  ELEVATED Aspiration Risk/Unknown    Anesthesia Type: Spinal.    Induction: N/a.   Maintenance: N/A.        Consents    Anesthesia Plan(s) and associated risks, benefits, and realistic alternatives discussed. Questions answered and patient/representative(s) expressed understanding.    - Discussed:     - Discussed with:  Patient      - Extended Intubation/Ventilatory Support Discussed: No.      - Patient is DNR/DNI Status: No    Use of blood products discussed: No .     Postoperative Care    Pain management: Multi-modal analgesia, Oral pain medications.   PONV prophylaxis: Ondansetron (or other 5HT-3)     Comments:    Other Comments: Discussed risks of anesthesia including nausea, vomiting, headache, itching, bleeding, infection, cardiopulmonary complications, neurologic complications, and serious complications.            Teja Martínez MD

## 2023-04-20 NOTE — PLAN OF CARE
Goal Outcome Evaluation:    VSS, Pt rested well. She denies any pre-ec symptoms, vaginal bleeding or any loss of fluid. Feeling baby movements. On Doptone monitoring for FHT, irregular rhythm noted, heard some pauses in between, FHR within normal range.

## 2023-04-20 NOTE — PLAN OF CARE
Goal Outcome Evaluation:      Plan of Care Reviewed With: patient, spouse Tearful about the babies unknown diagnosis.  Agreeable to the betamethasone and fetal lung shut placement.  Questions answered and encouraged. Support/smpathy given

## 2023-04-20 NOTE — PLAN OF CARE
VSS. Denies bleeding, LOF, cramping and contractions. Baby active per pt report. Audible arrhythmia with doppler, baseline 140s and audible fetal movements. Pt offers no complaints. Will continue to monitor overnight.

## 2023-04-20 NOTE — PROGRESS NOTES
Antepartum Progress Note    Subjective:   Felix is doing okay this morning. She knows that the US this morning showed reaccumulation of fluid in the baby's lungs and abdomen. Worried about baby and not sure about whether she would want resuscitation of the baby at this gestational age.    Objective:  Vitals:    23 1915 23 2322 23 0634 23 1100   BP: 117/70 117/64 107/64 112/75   BP Location: Right arm Left arm Left arm Left arm   Patient Position: Semi-Enriquez's Semi-Enriquez's Semi-Enriquez's Semi-Enriquez's   Cuff Size: Adult Regular Adult Regular Adult Regular Adult Large   Pulse:       Resp: 18  18    Temp: 98.8  F (37.1  C) 97.7  F (36.5  C) 98.4  F (36.9  C) 98.8  F (37.1  C)   TempSrc: Oral Oral Oral Oral     Gen: Resting comfortably in bed, NAD, tearful and anxious  CV: Well-perfused  Resp: Breathing comfortably on room air  Abd: Gravid, non-tender, non-distended  Ext: Non-tender, trace edema    Doptones: 130 bpm at 0634    Maternal Fetal US Comprehensive Sngle FU  Narrative:         Comp Follow Up  ---------------------------------------------------------------------------------------------------------  Pat. Name: FELIX DALAL       Study Date:  2023 8:15am  Pat. NO:  [id number]        Referring  MD: SIOMARA BRIAN  Site:  [facility]       Sonographer: Ashleigh Antonio RDMS   :  1995        Age:   28  ---------------------------------------------------------------------------------------------------------    INDICATION  ---------------------------------------------------------------------------------------------------------  Bilateral pleural effusion. Status post thoracocentesis 2023.    METHOD  ---------------------------------------------------------------------------------------------------------  [facility] ANTEPARTUM inpatient exam. Transabdominal ultrasound examination. View:  Sufficient    PREGNANCY  ---------------------------------------------------------------------------------------------------------  Bundy pregnancy. Number of fetuses: 1    DATING  ---------------------------------------------------------------------------------------------------------                                           Date                                Details                                                                                      Gest. age                      ISSAC  LMP                                  11/16/2022                                                                                                                        22 w + 1 d                     8/23/2023  Prior assessment               1/23/2023                         GA: 10 w + 2 d                                                                          22 w + 5 d                     8/19/2023  Assigned dating                  Dating performed on 04/5/2023, based on the LMP                                                              22 w + 1 d                     8/23/2023    GENERAL EVALUATION  ---------------------------------------------------------------------------------------------------------  Cardiac activity Arrhythmia.  bpm.  Presentation breech.  Placenta Anterior, No Previa, > 2 cm from internal os.  Umbilical cord previously studied.  Amniotic fluid normal MVP, MVP 6.4 cm.    FETAL ANATOMY  ---------------------------------------------------------------------------------------------------------  The following structures appear abnormal:  Heart / Thorax                      Right lung: pleural effusion. Left lung: pleural effusion.    Abdomen other: ascites    Gender: male.  Hydrops seen on today's exam.    FETAL DOPPLER  ---------------------------------------------------------------------------------------------------------  Mid Cerebral Artery: normal  PS                                           23.37                  cm/s  PS                                          0.83                    MoM    RECOMMENDATION  ---------------------------------------------------------------------------------------------------------  Thank-you for referring your hospitalized patient to assess fetal well-being due to nonimmune fetal hydrops of unknown etiology.    The patient was escorted back to her hospital room at the end of the ultrasound. I discussed the findings on today's ultrasound with the patient. Please see EPIC for further  documentation regarding plan of care.    If you have questions regarding today's evaluation or if we can be of further service, please contact the [facility].    **Fetal anomalies may be present but not detected**  Impression: IMPRESSION  ---------------------------------------------------------------------------------------------------------  1. Bundy intrauterine pregnancy at 22w1d gestational age s/p bilateral fetal thoracentesis on  here for a hydrops.  2. The amniotic fluid volume appeared normal.  3. There is evidence of fetal hydrops with reacculmulation of bilateral pleural effusions and trace abdominal/pelvic ascites and scalp edema.  4. The middle cerebral artery peak systolic velocity is <1.5 MoM, this is below the level associated with moderate to severe fetal anemia.    Assessment/Plan:   Paula Hagan is a 28 year old  at 22w1d by LMP c/w 10w US, now HD#2 admitted for fetal hydrops, now POD#1 s/p fetal thoracentesis. Pregnancy notable for fetal bilateral pleural effusions, history of CS x1, and hx of cystic hygroma with 18w fetal demise in prior pregnancy.    #Fetal hydrops  - She underwent amniocentesis for chromosomal microarray, whole exome sequencing, and for CMV/Parvovirus PCR.  The above workup was negative.  - S/p fetal thoracentesis    - Fluid studies: triglycerides <9, not chylothorax  - US today showed fetal hydrops with  reaccumulation of bilateral pleural effusions and trace abdominal/pelvic ascites and scalp edema  - Doptones TID, reassuring this morning  - Plan for bilateral fetal thoracoamniotic shunt placement on .   - Anesthesia to see patient, anticipate spinal for regional anesthesia   - NPO at midnight, clears possibly okay until 2 hours pre-op, defer to anesthesia  - BMZ for fetal lung maturity   - 1st dose given at 1128 today, 2nd dose to be given tomorrow  - NICU consult placed to discuss periviability    Seen and discussed with Dr. Shaggy Brooks MD  OB/GYN, PGY-3  2023, 12:06 PM     MFM Attending Attestation  I saw this patient with the resident and agree with the resident's findings and plan of care as documented in the resident's note. I personally reviewed her vital signs, medications, labs, pertinent imaging, and fetal monitoring. In summary, Ms. Hagan is a 28 year old  at 22w1d admitted for observation following fetal thoracentesis performed yesterday in the setting of bilateral pleural effusions and evolving nonimmune hydrops of unknown etiology.      Her work-up has thus far included:  -Negative antibody screen  -Normal MCA Dopplers  -Negative, CMV and Parvovirus testing  -Normal microarray and [place] (including hydrops panel and metabolic workup)  -Structurally normal fetus with normal fetal echocardiogram  -Pleural fluid not consistent with chylothorax     Since her procedure yesterday there has been reaccumulation of the fetal pleural fluid in addition to skin edema and trace ascites consistent with fetal hydrops. Dr. Santos discussed with the patient a plan for bilateral shunt placement which they have arranged for tomorrow AM.     Given that she is 22 weeks we also discussed periviability and fetal resuscitation. We reviewed that this time period is unique in that the periviable period is a gray zone in which decision making is fluid, patient driven based on the wishes of the family, and  looks different for every individual circumstance. We discussed in general the outcomes of fetuses born at 22, 23, and 24 weeks including overall survival rates and survival without severe or moderate neurologic morbidity. This decision is further complicated by the presence of fetal hydrops which significantly worsens the prognosis/outcomes of fetuses born regardless of gestational age. We reviewed options for expectant management/comfort care versus intervention if delivery were to be indicated and recommended they speak to the NICU as well. We also discussed Betamethasone in the context of a possible periviable delivery. Again we reviewed that each of these decisions are deeply personal and that we support whatever decision is right for her and her family. At this time they are undecided regarding their desire for fetal resuscitation in the event delivery was indicated.     /75 (BP Location: Left arm, Patient Position: Semi-Enriquez's, Cuff Size: Adult Large)   Pulse 80   Temp 98.8  F (37.1  C) (Oral)   Resp 18   LMP 11/16/2022 (Approximate)       +Doptones    Time Spent on this Encounter   I spent a total of 60 minutes face-to-face or coordinating care of Ms. Hagan. Over 50% of my time on the unit was spent counseling the patient and /or coordinating care regarding diagnosis, diagnostic results, prognosis and risks and benefits of treatment options.     Date of service (when I saw the patient): 4/20/2023     Joaquina Coon MD  , OB/GYN  Maternal-Fetal Medicine  [phone] (Pager)

## 2023-04-20 NOTE — PROGRESS NOTES
Patient and spouse crying after the MFM tean rounded re: baby boy with bilat plural effusions returning and hydrops. The team recommended shuts for tomorrow per Dr. Santos and a NICU consult re: resuscitation.  Patient given kleenex and paper and pen to write questions for the team.

## 2023-04-20 NOTE — UTILIZATION REVIEW
Admission Status; Secondary Review Determination    Under the authority of the Utilization Management Committee, the utilization review process indicated a secondary review on the above patient. The review outcome is based on review of the medical records, discussions with staff, and applying clinical experience noted on the date of the review.    (x) Inpatient Status Appropriate - This patient's medical care is consistent with medical management for inpatient care and reasonable inpatient medical practice.    RATIONALE FOR DETERMINATION: Complex 28-year-old -0-1-2 at 22 weeks with noted fetus having bilateral pleural effusions on anatomical ultrasound.  Patient had negative findings on amniocentesis and now has increasing volume of left pleural effusion with new thoracic skin thickening and arrhythmia.  Patient underwent bilateral fetal thoracentesis on 2023.  On hospital day 2 repeat ultrasound reveals significant reaccumulation of fluid in the baby's lungs and abdomen.  Patient thus will require ongoing hospitalization with plan for bilateral fetal thoracic or amniotic shunt placement on 2023.    At the time of admission with the information available to the attending physician more than 2 nights Hospital complex care was anticipated, based on patient risk of adverse outcome if treated as outpatient and complex care required. Inpatient admission is appropriate based on the Medicare guidelines.    This document was produced using voice recognition software    The information on this document is developed by the utilization review team in order for the business office to ensure compliance. This only denotes the appropriateness of proper admission status and does not reflect the quality of care rendered.    The definitions of Inpatient Status and Observation Status used in making the determination above are those provided in the CMS Coverage Manual, Chapter 1 and Chapter 6, section  70.4.    Sincerely,    Tree Herman MD  Utilization Review  Physician Advisor  [facility].

## 2023-04-21 ENCOUNTER — ANESTHESIA (OUTPATIENT)
Dept: OBGYN | Facility: CLINIC | Age: 28
End: 2023-04-21
Payer: COMMERCIAL

## 2023-04-21 ENCOUNTER — APPOINTMENT (OUTPATIENT)
Dept: ULTRASOUND IMAGING | Facility: CLINIC | Age: 28
End: 2023-04-21
Payer: COMMERCIAL

## 2023-04-21 VITALS
TEMPERATURE: 97.9 F | OXYGEN SATURATION: 100 % | SYSTOLIC BLOOD PRESSURE: 114 MMHG | RESPIRATION RATE: 16 BRPM | DIASTOLIC BLOOD PRESSURE: 69 MMHG | HEART RATE: 79 BPM

## 2023-04-21 LAB
BASOPHILS # BLD AUTO: 0 10E3/UL (ref 0–0.2)
BASOPHILS NFR BLD AUTO: 0 %
EOSINOPHIL # BLD AUTO: 0 10E3/UL (ref 0–0.7)
EOSINOPHIL NFR BLD AUTO: 0 %
ERYTHROCYTE [DISTWIDTH] IN BLOOD BY AUTOMATED COUNT: 13.5 % (ref 10–15)
HCT VFR BLD AUTO: 37 % (ref 35–47)
HGB BLD-MCNC: 12.1 G/DL (ref 11.7–15.7)
IMM GRANULOCYTES # BLD: 0.1 10E3/UL
IMM GRANULOCYTES NFR BLD: 1 %
LYMPHOCYTES # BLD AUTO: 1.7 10E3/UL (ref 0.8–5.3)
LYMPHOCYTES NFR BLD AUTO: 16 %
LYMPHOCYTES NFR FLD MANUAL: 5 %
LYMPHOCYTES NFR FLD MANUAL: 9 %
MCH RBC QN AUTO: 30.8 PG (ref 26.5–33)
MCHC RBC AUTO-ENTMCNC: 32.7 G/DL (ref 31.5–36.5)
MCV RBC AUTO: 94 FL (ref 78–100)
MONOCYTES # BLD AUTO: 0.6 10E3/UL (ref 0–1.3)
MONOCYTES NFR BLD AUTO: 6 %
MONOS+MACROS NFR FLD MANUAL: 85 %
MONOS+MACROS NFR FLD MANUAL: 91 %
NEUTROPHILS # BLD AUTO: 8.2 10E3/UL (ref 1.6–8.3)
NEUTROPHILS NFR BLD AUTO: 77 %
NEUTS BAND NFR FLD MANUAL: NORMAL %
NEUTS BAND NFR FLD MANUAL: NORMAL %
NRBC # BLD AUTO: 0 10E3/UL
NRBC BLD AUTO-RTO: 0 /100
OTHER CELLS FLD MANUAL: 4 %
OTHER CELLS FLD MANUAL: 6 %
PATH REPORT.COMMENTS IMP SPEC: NORMAL
PATH REPORT.FINAL DX SPEC: NORMAL
PATH REPORT.MICROSCOPIC SPEC OTHER STN: NORMAL
PATH REPORT.RELEVANT HX SPEC: NORMAL
PLATELET # BLD AUTO: 210 10E3/UL (ref 150–450)
RBC # BLD AUTO: 3.93 10E6/UL (ref 3.8–5.2)
WBC # BLD AUTO: 10.7 10E3/UL (ref 4–11)

## 2023-04-21 PROCEDURE — 710N000010 HC RECOVERY PHASE 1, LEVEL 2, PER MIN: Performed by: OBSTETRICS & GYNECOLOGY

## 2023-04-21 PROCEDURE — 250N000011 HC RX IP 250 OP 636: Performed by: STUDENT IN AN ORGANIZED HEALTH CARE EDUCATION/TRAINING PROGRAM

## 2023-04-21 PROCEDURE — 2894A VOIDCORRECT: CPT | Performed by: OBSTETRICS & GYNECOLOGY

## 2023-04-21 PROCEDURE — 258N000003 HC RX IP 258 OP 636

## 2023-04-21 PROCEDURE — 85004 AUTOMATED DIFF WBC COUNT: CPT | Performed by: STUDENT IN AN ORGANIZED HEALTH CARE EDUCATION/TRAINING PROGRAM

## 2023-04-21 PROCEDURE — 59076 FETAL SHUNT PLACEMENT W/US: CPT | Mod: GC | Performed by: OBSTETRICS & GYNECOLOGY

## 2023-04-21 PROCEDURE — 360N000074 HC SURGERY LEVEL 1, PER MIN: Performed by: OBSTETRICS & GYNECOLOGY

## 2023-04-21 PROCEDURE — 250N000011 HC RX IP 250 OP 636

## 2023-04-21 PROCEDURE — 250N000013 HC RX MED GY IP 250 OP 250 PS 637: Performed by: STUDENT IN AN ORGANIZED HEALTH CARE EDUCATION/TRAINING PROGRAM

## 2023-04-21 PROCEDURE — 99238 HOSP IP/OBS DSCHRG MGMT 30/<: CPT | Mod: 25 | Performed by: OBSTETRICS & GYNECOLOGY

## 2023-04-21 PROCEDURE — 76815 OB US LIMITED FETUS(S): CPT | Mod: 26 | Performed by: OBSTETRICS & GYNECOLOGY

## 2023-04-21 PROCEDURE — 272N000002 HC OR SUPPLY OTHER OPNP: Performed by: OBSTETRICS & GYNECOLOGY

## 2023-04-21 PROCEDURE — 272N000001 HC OR GENERAL SUPPLY STERILE: Performed by: OBSTETRICS & GYNECOLOGY

## 2023-04-21 PROCEDURE — 258N000003 HC RX IP 258 OP 636: Performed by: STUDENT IN AN ORGANIZED HEALTH CARE EDUCATION/TRAINING PROGRAM

## 2023-04-21 PROCEDURE — 370N000017 HC ANESTHESIA TECHNICAL FEE, PER MIN: Performed by: OBSTETRICS & GYNECOLOGY

## 2023-04-21 PROCEDURE — 999N000141 HC STATISTIC PRE-PROCEDURE NURSING ASSESSMENT: Performed by: OBSTETRICS & GYNECOLOGY

## 2023-04-21 PROCEDURE — 59076 FETAL SHUNT PLACEMENT W/US: CPT

## 2023-04-21 PROCEDURE — 10H00YZ INSERTION OF OTHER DEVICE INTO PRODUCTS OF CONCEPTION, OPEN APPROACH: ICD-10-PCS | Performed by: OBSTETRICS & GYNECOLOGY

## 2023-04-21 PROCEDURE — 36415 COLL VENOUS BLD VENIPUNCTURE: CPT | Performed by: STUDENT IN AN ORGANIZED HEALTH CARE EDUCATION/TRAINING PROGRAM

## 2023-04-21 RX ORDER — CEFAZOLIN SODIUM 2 G/100ML
2 INJECTION, SOLUTION INTRAVENOUS ONCE
Status: COMPLETED | OUTPATIENT
Start: 2023-04-21 | End: 2023-04-21

## 2023-04-21 RX ORDER — BUPIVACAINE HYDROCHLORIDE 7.5 MG/ML
INJECTION, SOLUTION INTRASPINAL
Status: COMPLETED | OUTPATIENT
Start: 2023-04-21 | End: 2023-04-21

## 2023-04-21 RX ORDER — INDOMETHACIN 50 MG/1
50 CAPSULE ORAL ONCE
Status: COMPLETED | OUTPATIENT
Start: 2023-04-21 | End: 2023-04-21

## 2023-04-21 RX ORDER — CITRIC ACID/SODIUM CITRATE 334-500MG
30 SOLUTION, ORAL ORAL ONCE
Status: COMPLETED | OUTPATIENT
Start: 2023-04-21 | End: 2023-04-21

## 2023-04-21 RX ORDER — ONDANSETRON 4 MG/1
4 TABLET, ORALLY DISINTEGRATING ORAL EVERY 30 MIN PRN
Status: DISCONTINUED | OUTPATIENT
Start: 2023-04-21 | End: 2023-04-21 | Stop reason: HOSPADM

## 2023-04-21 RX ORDER — ONDANSETRON 2 MG/ML
4 INJECTION INTRAMUSCULAR; INTRAVENOUS EVERY 30 MIN PRN
Status: DISCONTINUED | OUTPATIENT
Start: 2023-04-21 | End: 2023-04-21 | Stop reason: HOSPADM

## 2023-04-21 RX ORDER — INDOMETHACIN 25 MG/1
25 CAPSULE ORAL 3 TIMES DAILY
Qty: 6 CAPSULE | Refills: 0 | Status: SHIPPED | OUTPATIENT
Start: 2023-04-21 | End: 2023-05-11

## 2023-04-21 RX ORDER — ACETAMINOPHEN 325 MG/1
975 TABLET ORAL ONCE
Status: COMPLETED | OUTPATIENT
Start: 2023-04-21 | End: 2023-04-21

## 2023-04-21 RX ORDER — SODIUM CHLORIDE, SODIUM LACTATE, POTASSIUM CHLORIDE, CALCIUM CHLORIDE 600; 310; 30; 20 MG/100ML; MG/100ML; MG/100ML; MG/100ML
INJECTION, SOLUTION INTRAVENOUS CONTINUOUS
Status: DISCONTINUED | OUTPATIENT
Start: 2023-04-21 | End: 2023-04-21 | Stop reason: HOSPADM

## 2023-04-21 RX ORDER — SODIUM CHLORIDE, SODIUM LACTATE, POTASSIUM CHLORIDE, CALCIUM CHLORIDE 600; 310; 30; 20 MG/100ML; MG/100ML; MG/100ML; MG/100ML
INJECTION, SOLUTION INTRAVENOUS CONTINUOUS PRN
Status: DISCONTINUED | OUTPATIENT
Start: 2023-04-21 | End: 2023-04-21

## 2023-04-21 RX ORDER — LIDOCAINE HYDROCHLORIDE 10 MG/ML
INJECTION, SOLUTION EPIDURAL; INFILTRATION; INTRACAUDAL; PERINEURAL
Status: DISCONTINUED
Start: 2023-04-21 | End: 2023-04-21 | Stop reason: HOSPADM

## 2023-04-21 RX ORDER — LIDOCAINE 40 MG/G
CREAM TOPICAL
Status: DISCONTINUED | OUTPATIENT
Start: 2023-04-21 | End: 2023-04-21 | Stop reason: HOSPADM

## 2023-04-21 RX ORDER — VECURONIUM BROMIDE 1 MG/ML
0.04 INJECTION, POWDER, LYOPHILIZED, FOR SOLUTION INTRAVENOUS ONCE
Status: DISCONTINUED | OUTPATIENT
Start: 2023-04-21 | End: 2023-04-21

## 2023-04-21 RX ORDER — INDOMETHACIN 25 MG/1
25 CAPSULE ORAL 3 TIMES DAILY
Status: DISCONTINUED | OUTPATIENT
Start: 2023-04-21 | End: 2023-04-21 | Stop reason: HOSPADM

## 2023-04-21 RX ADMIN — BUPIVACAINE HYDROCHLORIDE IN DEXTROSE 1.4 ML: 7.5 INJECTION, SOLUTION SUBARACHNOID at 10:31

## 2023-04-21 RX ADMIN — PHENYLEPHRINE HYDROCHLORIDE 50 MCG: 10 INJECTION INTRAVENOUS at 10:33

## 2023-04-21 RX ADMIN — INDOMETHACIN 50 MG: 50 CAPSULE ORAL at 14:11

## 2023-04-21 RX ADMIN — PHENYLEPHRINE HYDROCHLORIDE 50 MCG: 10 INJECTION INTRAVENOUS at 10:32

## 2023-04-21 RX ADMIN — PHENYLEPHRINE HYDROCHLORIDE 100 MCG: 10 INJECTION INTRAVENOUS at 10:50

## 2023-04-21 RX ADMIN — SODIUM CHLORIDE, POTASSIUM CHLORIDE, SODIUM LACTATE AND CALCIUM CHLORIDE: 600; 310; 30; 20 INJECTION, SOLUTION INTRAVENOUS at 10:19

## 2023-04-21 RX ADMIN — PHENYLEPHRINE HYDROCHLORIDE 100 MCG: 10 INJECTION INTRAVENOUS at 10:35

## 2023-04-21 RX ADMIN — ASPIRIN 81 MG: 81 TABLET, COATED ORAL at 14:11

## 2023-04-21 RX ADMIN — PHENYLEPHRINE HYDROCHLORIDE 150 MCG: 10 INJECTION INTRAVENOUS at 11:02

## 2023-04-21 RX ADMIN — SODIUM CITRATE AND CITRIC ACID MONOHYDRATE 30 ML: 500; 334 SOLUTION ORAL at 10:00

## 2023-04-21 RX ADMIN — PHENYLEPHRINE HYDROCHLORIDE 100 MCG: 10 INJECTION INTRAVENOUS at 10:34

## 2023-04-21 RX ADMIN — PRENATAL VITAMINS-IRON FUMARATE 27 MG IRON-FOLIC ACID 0.8 MG TABLET 1 TABLET: at 14:11

## 2023-04-21 RX ADMIN — PHENYLEPHRINE HYDROCHLORIDE 100 MCG: 10 INJECTION INTRAVENOUS at 10:54

## 2023-04-21 RX ADMIN — Medication 2 G: at 10:26

## 2023-04-21 RX ADMIN — ACETAMINOPHEN 975 MG: 325 TABLET, FILM COATED ORAL at 09:31

## 2023-04-21 RX ADMIN — PHENYLEPHRINE HYDROCHLORIDE 150 MCG: 10 INJECTION INTRAVENOUS at 10:42

## 2023-04-21 RX ADMIN — SODIUM CHLORIDE, POTASSIUM CHLORIDE, SODIUM LACTATE AND CALCIUM CHLORIDE: 600; 310; 30; 20 INJECTION, SOLUTION INTRAVENOUS at 08:53

## 2023-04-21 RX ADMIN — BETAMETHASONE SODIUM PHOSPHATE AND BETAMETHASONE ACETATE 12 MG: 3; 3 INJECTION, SUSPENSION INTRA-ARTICULAR; INTRALESIONAL; INTRAMUSCULAR; SOFT TISSUE at 12:27

## 2023-04-21 RX ADMIN — PHENYLEPHRINE HYDROCHLORIDE 100 MCG: 10 INJECTION INTRAVENOUS at 10:58

## 2023-04-21 ASSESSMENT — ACTIVITIES OF DAILY LIVING (ADL)
ADLS_ACUITY_SCORE: 18

## 2023-04-21 NOTE — PROGRESS NOTES
Antepartum Progress Note    Subjective:   Paula is doing okay, didn't sleep well overnight, but just slept a little over the past couple hours. She has some questions for Dr. Santos about the thoracoamniotic shunt placement later today. Denies contractions, LOF, or VB. Normal FM.    Objective:  Vitals:    23 2241 23 2334 23 0515 23 0855   BP: 106/69 120/75 114/68 121/66   BP Location: Left arm Left arm Left arm    Patient Position: Semi-Enriquez's Semi-Enriquez's Sitting    Cuff Size: Adult Large Adult Regular Adult Regular    Pulse:       Resp:   16   Temp: 98.1  F (36.7  C) 97.9  F (36.6  C) 98.4  F (36.9  C) 98  F (36.7  C)   TempSrc: Oral Oral Oral Oral     Gen: Resting comfortably in bed, NAD, tearful and anxious  CV: Well-perfused  Resp: Breathing comfortably on room air  Abd: Gravid, non-tender, non-distended  Ext: Non-tender, trace edema    Doptones: 135 bpm at 0515    Assessment/Plan:   Paula Hagan is a 28 year old  at 22w2d by LMP c/w 10w US, now HD#3 admitted for fetal hydrops, now POD#2 s/p fetal thoracentesis. Pregnancy notable for fetal bilateral pleural effusions, history of CS x1, and hx of cystic hygroma with 18w fetal demise in prior pregnancy.    #Fetal hydrops  - She underwent amniocentesis for chromosomal microarray, whole exome sequencing, and for CMV/Parvovirus PCR.  The above workup was negative.  - S/p fetal thoracentesis    - Fluid studies: triglycerides <9, not chylothorax.    - Monomorphic lymphocytes, possible blasts - c/f leukemia or lymphoma  - US yesterday showed fetal hydrops with reaccumulation of bilateral pleural effusions and trace abdominal/pelvic ascites and scalp edema  - Doptones TID, reassuring this morning  - Plan for bilateral fetal thoracoamniotic shunt placement today   - Anesthesia plans spinal for regional anesthesia   - NPO   - Written informed consent signed, including consent for classical CS if indicated.  - BMZ for fetal lung  maturity   - 1st dose given at 1128 yesterday, 2nd dose to be given today  - NICU consult placed to discuss periviability. Patient does not want to see NICU until after the shunts.  - Consider SW and spiritual health consults if patient desires    Dispo: possible afternoon discharge if BSUS shows her shunts are working well and if clinically stable    Seen and discussed with Dr. Tom Brokos MD  OB/GYN, PGY-3  04/21/2023, 9:43 AM

## 2023-04-21 NOTE — ANESTHESIA PROCEDURE NOTES
"Intrathecal injection Procedure Note    Pre-Procedure   Staff -        Anesthesiologist:  Radha Dean MD       Resident/Fellow: Abrahan Jordan MD       Performed By: resident       Location: OB       Pre-Anesthestic Checklist: patient identified, IV checked, risks and benefits discussed, informed consent, monitors and equipment checked, pre-op evaluation, at physician/surgeon's request and post-op pain management  Timeout:       Correct Patient: Yes        Correct Procedure: Yes        Correct Site: Yes        Correct Position: Yes   Procedure Documentation  Procedure: intrathecal injection       Patient Position: sitting       Skin prep: Chloraprep       Insertion Site: L3-4. (midline approach).       Needle Gauge: 25.        Needle Length (Inches): 3.5        Spinal Needle Type: Pencan       Introducer used       Introducer: 20 G       # of attempts: 1 and  # of redirects:  1    Assessment/Narrative         Paresthesias: No.       Sensory Level: T6       CSF fluid: clear.    Medication(s) Administered   0.75% Hyperbaric Bupivacaine (Intrathecal) - Intrathecal   1.4 mL - 4/21/2023 10:31:00 AM    FOR [facility] ([facility] ONLY:   Pain Team Contact information: please page the Pain Team Via [employer] [facility] [employer]. Search \"Pain\". During daytime hours, please page the attending first. At night please page the resident first.      "

## 2023-04-21 NOTE — PLAN OF CARE
VSS, pt denies pain. Pt denies cramping, LOF or bleeding. Pt reports active FM. Pt able to void post-procedure x2. Pt tolerating PO fluids and food. Discharged instructions reviewed with pt including new medications and when to notify provider and follow up visits. Pt discharged ambulatory to home at 1530.

## 2023-04-21 NOTE — OP NOTE
Operative Note  Fetal Procedure: Thoracoamniotic shunt placement              Pre-Op Diagnosis:   1) Bilateral fetal pleural effusions s/p thoracentesis bilaterally with reaccumulation of fluid  2) Fetal hydrops             Post-Op Diagnosis:   1) Same s/p placement of fetal bilateral thoracoamniotic shunts          Procedure:               1) Fetal bilateral thoracoamniotic shunt placement under continuous ultrasound guidance          Surgeons:   Attending:  Abrahan Santos MD  Fellow:  Loida Kerns MD  Resident:  Gauri Brooks MD            Anesthesia:   Spinal  Fetal administration of vecuronium. (0.04 mg IM)          Estimated Blood Loss:   None          Findings:   1) US: Bundy pregnancy, cephalic presentation, anterior placenta,  MVP of 6.4 cm.     g (on measurement 23)   2) Pleural effusions:  Left measuring 6.5 mm, Right 6.5 mm.   3) Adequate placement of bilateral Gilbert shunts for drainage of pleural effusions into the amniotic cavity  4) Fetal heart rate within normal range during and after the procedure   5) Post shunt placement measurements: Left measuring 3.5 mm, Right 5 mm.           Specimens:   None          Complications:   None apparent           History:   Paula Hagan is a 28 y.o.  at 22w2d x LMP c/w 10 week sono.  She was noted to have fetal bilateral pleural effusions on her anatomy US.  She underwent amniocentesis for chromosomal microarray, whole exome sequencing, and for CMV/Parvovirus PCR.  The above workup was negative.  Follow up US at 21w6d with increasing volume of the left pleural effusion with new thoracic skin thickening and arrhythmia.  Thus she presented today for fetal bilateral thoracentesis.  Bilateral thoracentesis was performed on 23.  Unfortunately on POD#1 the pleural effusions were noted to have reaccumulated.  There was fetal hydrops with trace abdominal/pelvic ascites and scalp edema.  Cell count with differential and triglyceride level  was performed on the pleural fluid.  Results did not seem consistent with a chylothorax.  Flow cytometry is pending.  She was counseled on management options and desired to proceed with bilateral fetal thoracoamniotic shunt placement for therapeutic benefit for fetal lungs and also for diagnostic benefit to evaluate for resolution of hydrops after drainage of pleural effusions.  She received 2 doses of  steroids.  After review of risks/benefits, including but not limited to PPROM, infection, emergent  delivery due to fetal decompensation and possible need for future procedure or ineffective treatment the patient decided to proceed.  All questions were answered, and informed consent was signed.          Details of Procedure:   The patient was taken to the OR. She underwent spinal anesthesia and a time-out was performed to confirm correct patient and procedure. She was given  2 g Ancef for procedure prophylaxis.     She was placed in the dorsal supine position with a leftward tilt. She was prepped and draped in the normal sterile fashion. Ultrasound was performed to determine fetal presentation and amniotic fluid volume. The fetus was noted to be in the cephalic presentation. The placenta was anterior.  Fetal paralysis was achieved by intramuscular injection of 0.04 mg of vecuronium into the fetal left thigh using a 22 g needle under continuous ultrasound guidance.  Fetal manipulation was performed with gentle uterine pressure to achieve an adequate exposure of the left posterior fetal chest.  A small 0.25 cm incision was made using a #11 blade scalpel.  A 13 g trocar was inserted under continuous ultrasound guidance with one transamniotic pass in to the left posterior thoracic fetal cavity. The stent guide wire was introduced and the stent was advanced.  The guide wire was then removed, and the distal coil of the stent was deployed into the left pleural effusion.  The trocar was removed from the  fetal thoracic cavity allowing the proximal coil of the stent to deploy into the amniotic cavity.  Care was taken to ensure the proximal coil of the stent was not in the uterine wall.  Adequate placement was confirmed and ultrasound images obtained to document stent function.  The system was removed from the thorax and the maternal abdomen.  Fetal heart rate was observed and noted to be in the 140s.    Fetal manipulation was performed with gentle uterine pressure to achieve an adequate exposure of the right fetal chest.  Due to fetal positioning, an anterior approach was selected. A small 0.25 cm incision was made using a #11 blade scalpel.  A second 13 g trocar was inserted under continuous ultrasound guidance with one transamniotic pass in to the right anterior thoracic fetal cavity. The stent guide wire was introduced however the stent was unable to advance.  Thus the entire system was removed.  A third 13 g trocar was inserted under continuous ultrasound guidance with one transamniotic pass in to the right anterior thoracic fetal cavity. The stent guide wire was introduced and the stent was advanced successfully.  The guide wire was then removed, and the distal coil of the stent was deployed into the right pleural effusion.  The trocar was removed from the fetal thoracic cavity allowing the proximal coil of the stent to deploy into the amniotic cavity.  Care was taken to ensure the proximal coil of the stent was not in the uterine wall.  Adequate placement was confirmed and ultrasound images obtained to document stent function.  The system was removed from the thorax and the maternal abdomen.  Fetal heart rate was observed and noted to be in the 140s.  At this point, the left pleural effusion was reassessed and noted to have drained significantly.    The fetal heart rate was within normal limits throughout the procedure.  The patient tolerated the procedure well and was brought to PACU for continued  monitoring.      Start time: 10:44 (vecuronium) ;11:15 L shunt; 11:30 R shunt   End time:  11:21 L shunt; 12:04 R shunt     Gilbert Fetal Stent Set.  Lot number [id number].  Expiration date 2/2/24.     Dr. Santos was present and scrubbed for the duration of the procedure.      Loida Kerns MD   Maternal-Fetal Medicine Fellow, PGY6  4/21/2023 12:59 PM

## 2023-04-21 NOTE — PLAN OF CARE
Data: Today is hospital day 2. Maternal status stable, VSS, pt afebrile. Pt declines RUQ pain, changes in vision, headache, leaking of fluid, and bleeding. FHR assessed with doppler (see flow record for documentation).   Action: Continue with plan of care as ordered. Pt encouraged to move extremities. RN encouraged use of SCDs, pt declined use of SCDs. RN educated pt on benefits of SCDs.  Response: Patient coping well with spouse at bedside. Pt was able to rest and get some sleep during the night.    Report given to Jenny MORELAND RN at 0714.

## 2023-04-21 NOTE — PLAN OF CARE
Goal Outcome Evaluation:  Pt states is comfortable.  per doppler. Pt denies pain, feeling ctx's, leaking or bleeding. Shower with Hibiclens tonight and tomorrow.  at bedside. Saline locked flushed.

## 2023-04-21 NOTE — PROVIDER NOTIFICATION
04/21/23 1115   Open Drain Inferior;Medial Abdomen Bilateral fetal thoracoamniotic shunts, total of 2 shunts placed   Placement Date/Time: 04/21/23 1115   Inserted by: Dr. Santos  Orientation: Inferior;Medial  Location: Abdomen  Additional Comments: Bilateral fetal thoracoamniotic shunts, total of 2 shunts placed   Site Description UTV

## 2023-04-21 NOTE — DISCHARGE INSTRUCTIONS
Discharge Instruction for Undelivered Patients      You were seen for: Fetal Assessment  We Consulted: Dr. Santos  You had (Test or Medicine): Fetal bilateral thoracoamniotic shunt placement      Diet:   Drink 8 to 12 glasses of liquids (milk, juice, water) every day.  You may eat meals and snacks.     Activity:  Count fetal kicks everyday (see handout)  Call your doctor or nurse midwife if your baby is moving less than usual.     Call your provider if you notice:  Swelling in your face or increased swelling in your hands or legs.  Headaches that are not relieved by Tylenol (acetaminophen).  Changes in your vision (blurring: seeing spots or stars.)  Nausea (sick to your stomach) and vomiting (throwing up).   Weight gain of 5 pounds or more per week.  Heartburn that doesn't go away.  Signs of bladder infection: pain when you urinate (use the toilet), need to go more often and more urgently.  The bag of marrero (rupture of membranes) breaks, or you notice leaking in your underwear.  Bright red blood in your underwear.  Abdominal (lower belly) or stomach pain.  For first baby: Contractions (tightening) less than 5 minutes apart for one hour or more.  Second (plus) baby: Contractions (tightening) less than 10 minutes apart and getting stronger.  *If less than 34 weeks: Contractions (tightening) more than 6 times in one hour.  Increase or change in vaginal discharge (note the color and amount)    Follow-up:  Make an appointment to be seen on Friday April 28th with Dr. Santos

## 2023-04-21 NOTE — ANESTHESIA CARE TRANSFER NOTE
Patient: Paula Hagan    Procedure: Procedure(s):  INSERTION, SHUNT, THORACOAMNIOTIC BILATERAL       Diagnosis: Fetal hydrops [P83.2]  Diagnosis Additional Information: No value filed.    Anesthesia Type:   Spinal     Note:    Oropharynx: oropharynx clear of all foreign objects and spontaneously breathing  Level of Consciousness: awake  Oxygen Supplementation: room air    Independent Airway: airway patency satisfactory and stable  Dentition: dentition unchanged  Vital Signs Stable: post-procedure vital signs reviewed and stable  Report to RN Given: handoff report given  Patient transferred to: PACU    Handoff Report: Identifed the Patient, Identified the Reponsible Provider, Reviewed the pertinent medical history, Discussed the surgical course, Reviewed Intra-OP anesthesia mangement and issues during anesthesia, Set expectations for post-procedure period and Allowed opportunity for questions and acknowledgement of understanding      Vitals:  Vitals Value Taken Time   BP     Temp     Pulse     Resp     SpO2         Electronically Signed By: Abrahan Jordan MD  April 21, 2023  12:22 PM

## 2023-04-24 ENCOUNTER — PRENATAL OFFICE VISIT (OUTPATIENT)
Dept: OBGYN | Facility: OTHER | Age: 28
End: 2023-04-24
Attending: OBSTETRICS & GYNECOLOGY
Payer: COMMERCIAL

## 2023-04-24 ENCOUNTER — HOSPITAL ENCOUNTER (OUTPATIENT)
Dept: ULTRASOUND IMAGING | Facility: OTHER | Age: 28
Discharge: HOME | End: 2023-04-24
Attending: OBSTETRICS & GYNECOLOGY
Payer: COMMERCIAL

## 2023-04-24 VITALS
SYSTOLIC BLOOD PRESSURE: 124 MMHG | RESPIRATION RATE: 16 BRPM | BODY MASS INDEX: 25.2 KG/M2 | TEMPERATURE: 97.7 F | HEART RATE: 76 BPM | WEIGHT: 146.8 LBS | DIASTOLIC BLOOD PRESSURE: 86 MMHG | OXYGEN SATURATION: 100 %

## 2023-04-24 DIAGNOSIS — O35.CXX0 PLEURAL EFFUSION, FETAL, AFFECTING CARE OF MOTHER, ANTEPARTUM: ICD-10-CM

## 2023-04-24 DIAGNOSIS — O35.CXX0 PLEURAL EFFUSION, FETAL, AFFECTING CARE OF MOTHER, ANTEPARTUM: Primary | ICD-10-CM

## 2023-04-24 DIAGNOSIS — O09.93 HIGH-RISK PREGNANCY IN THIRD TRIMESTER: ICD-10-CM

## 2023-04-24 PROCEDURE — 99207 PR OB VISIT-NO CHARGE - GICH ONLY: CPT | Performed by: OBSTETRICS & GYNECOLOGY

## 2023-04-24 PROCEDURE — 76816 OB US FOLLOW-UP PER FETUS: CPT

## 2023-04-24 ASSESSMENT — PAIN SCALES - GENERAL: PAINLEVEL: NO PAIN (0)

## 2023-04-24 NOTE — PROGRESS NOTES
CC: Recheck OB visit at 22w5d    HPI: Felix Hagan presents for a routine OB visit now at 22w5d  Concerns: Recently placed fetal pleuro-amniotic shunts. Repeat US today shows adequate placement of the shunts.    Patient notices normal fetal movement, denies contractions, vaginal bleeding or leaking of fluid.    OB History    Para Term  AB Living   4 2 2 0 1 2   SAB IAB Ectopic Multiple Live Births   0 0 0 0 2      # Outcome Date GA Lbr Femi/2nd Weight Sex Delivery Anes PTL Lv   4 Current            3 Term 21 37w1d  3.189 kg (7 lb 0.5 oz) M CS-LTranv   LES      Complications: Preeclampsia/Hypertension      Name: LOIS ARGUETA-FELIX      Apgar1: 8  Apgar5: 8   2 AB 19 18w1d   M          Birth Comments: Fetal Hydrops   1 Term 18   3.201 kg (7 lb 0.9 oz) M CS-LVertical Gen  LES      Complications: Fetal Intolerance, Failure to Progress in First Stage      Name: ERIC ARGUETA      Apgar1: 7  Apgar5: 8     Current Outpatient Medications   Medication     albuterol (PROAIR HFA/PROVENTIL HFA/VENTOLIN HFA) 108 (90 Base) MCG/ACT inhaler     aspirin (ASA) 81 MG EC tablet     Prenatal Vit-Fe Fumarate-FA (PRENATAL MULTIVITAMIN W/IRON) 27-0.8 MG tablet     indomethacin (INDOCIN) 25 MG capsule     No current facility-administered medications for this visit.         O: /86   Pulse 76   Temp 97.7  F (36.5  C) (Temporal)   Resp 16   Wt 66.6 kg (146 lb 12.8 oz)   LMP 2022 (Approximate)   SpO2 100%   Breastfeeding No   BMI 25.20 kg/m    Body mass index is 25.2 kg/m .  See OB flow sheet  EXAM:  NAD  US read pending    No results found for any visits on 23.    A/P: 22w5d  Patient has f/u US and MFM visit in [place] on Friday.  Spoke with Dr. Santos about US results today. He is reassured that there is less fetal edema than last week. The stents appear to be in and working.  Will f/u alternating with MFM. Plan for delivery in [place] when they recommend it. Start NST's at 24  weeks. Continue weekly US for monitoring. Will perform here very other week, and the off week in [place]    Recheck in 2 weeks    Problem List:   Pregnancy risk factors include:  Hx of preeclampsia  Hx of gestational DI- monitor for polydipsia, polyuria  Hx of hydrops with fetal demise in mid-trimester  S/p fetal pleuro-amnionic shunts. Plan [place] delivery    Seth Morfin MD FACOG  10:08 AM 4/24/2023

## 2023-04-24 NOTE — NURSING NOTE
"Chief Complaint   Patient presents with     Prenatal Care     22.5 weeks       Initial /86   Pulse 76   Temp 97.7  F (36.5  C) (Temporal)   Resp 16   Wt 66.6 kg (146 lb 12.8 oz)   LMP 11/16/2022 (Approximate)   SpO2 100%   Breastfeeding No   BMI 25.20 kg/m   Estimated body mass index is 25.2 kg/m  as calculated from the following:    Height as of 12/15/22: 1.626 m (5' 4\").    Weight as of this encounter: 66.6 kg (146 lb 12.8 oz).  Medication Reconciliation: complete    FOOD SECURITY SCREENING QUESTIONS  Hunger Vital Signs:  Within the past 12 months we worried whether our food would run out before we got money to buy more. Never  Within the past 12 months the food we bought just didn't last and we didn't have money to get more. Never        Advance care directive on file? no  Advance care directive provided to patient? declined     Laurence Das LPN  "

## 2023-04-28 ENCOUNTER — OFFICE VISIT (OUTPATIENT)
Dept: MATERNAL FETAL MEDICINE | Facility: CLINIC | Age: 28
End: 2023-04-28
Attending: OBSTETRICS & GYNECOLOGY
Payer: COMMERCIAL

## 2023-04-28 ENCOUNTER — HOSPITAL ENCOUNTER (OUTPATIENT)
Dept: ULTRASOUND IMAGING | Facility: CLINIC | Age: 28
Discharge: HOME | End: 2023-04-28
Attending: OBSTETRICS & GYNECOLOGY
Payer: COMMERCIAL

## 2023-04-28 DIAGNOSIS — O35.CXX0 PLEURAL EFFUSION, FETAL, AFFECTING CARE OF MOTHER, ANTEPARTUM: ICD-10-CM

## 2023-04-28 PROCEDURE — 76816 OB US FOLLOW-UP PER FETUS: CPT

## 2023-04-28 PROCEDURE — 76816 OB US FOLLOW-UP PER FETUS: CPT | Mod: 26 | Performed by: STUDENT IN AN ORGANIZED HEALTH CARE EDUCATION/TRAINING PROGRAM

## 2023-04-28 NOTE — PROGRESS NOTES
Please see the full imaging report from the [employer] program under the imaging tab.    Candis Dahl MD  Maternal Fetal Medicine

## 2023-05-05 ENCOUNTER — OFFICE VISIT (OUTPATIENT)
Dept: MATERNAL FETAL MEDICINE | Facility: CLINIC | Age: 28
End: 2023-05-05
Attending: OBSTETRICS & GYNECOLOGY
Payer: COMMERCIAL

## 2023-05-05 ENCOUNTER — HOSPITAL ENCOUNTER (OUTPATIENT)
Dept: ULTRASOUND IMAGING | Facility: CLINIC | Age: 28
Discharge: HOME | End: 2023-05-05
Attending: OBSTETRICS & GYNECOLOGY
Payer: COMMERCIAL

## 2023-05-05 DIAGNOSIS — O35.CXX0 PLEURAL EFFUSION, FETAL, AFFECTING CARE OF MOTHER, ANTEPARTUM: ICD-10-CM

## 2023-05-05 PROCEDURE — 76816 OB US FOLLOW-UP PER FETUS: CPT | Mod: 26 | Performed by: OBSTETRICS & GYNECOLOGY

## 2023-05-05 PROCEDURE — 76816 OB US FOLLOW-UP PER FETUS: CPT

## 2023-05-05 NOTE — PROGRESS NOTES
Please refer to ultrasound report under 'Imaging' Studies of 'Chart Review' tabs.    Abrahan Santos M.D.

## 2023-05-11 ENCOUNTER — HOSPITAL ENCOUNTER (OUTPATIENT)
Dept: ULTRASOUND IMAGING | Facility: OTHER | Age: 28
Discharge: HOME | End: 2023-05-11
Attending: OBSTETRICS & GYNECOLOGY
Payer: COMMERCIAL

## 2023-05-11 ENCOUNTER — PRENATAL OFFICE VISIT (OUTPATIENT)
Dept: OBGYN | Facility: OTHER | Age: 28
End: 2023-05-11
Attending: OBSTETRICS & GYNECOLOGY
Payer: COMMERCIAL

## 2023-05-11 VITALS
WEIGHT: 145 LBS | DIASTOLIC BLOOD PRESSURE: 68 MMHG | HEART RATE: 88 BPM | BODY MASS INDEX: 24.89 KG/M2 | SYSTOLIC BLOOD PRESSURE: 122 MMHG

## 2023-05-11 DIAGNOSIS — Z34.82 NORMAL PREGNANCY IN MULTIGRAVIDA IN SECOND TRIMESTER: Primary | ICD-10-CM

## 2023-05-11 DIAGNOSIS — O35.CXX0 PLEURAL EFFUSION, FETAL, AFFECTING CARE OF MOTHER, ANTEPARTUM: ICD-10-CM

## 2023-05-11 DIAGNOSIS — E03.8 OTHER SPECIFIED HYPOTHYROIDISM: ICD-10-CM

## 2023-05-11 LAB
ALBUMIN SERPL BCG-MCNC: 4.1 G/DL (ref 3.5–5.2)
ALP SERPL-CCNC: 69 U/L (ref 35–104)
ALT SERPL W P-5'-P-CCNC: 11 U/L (ref 10–35)
ANION GAP SERPL CALCULATED.3IONS-SCNC: 11 MMOL/L (ref 7–15)
AST SERPL W P-5'-P-CCNC: 18 U/L (ref 10–35)
BASOPHILS # BLD AUTO: 0 10E3/UL (ref 0–0.2)
BASOPHILS NFR BLD AUTO: 0 %
BILIRUB SERPL-MCNC: 1.1 MG/DL
BUN SERPL-MCNC: 5.3 MG/DL (ref 6–20)
CALCIUM SERPL-MCNC: 9 MG/DL (ref 8.6–10)
CHLORIDE SERPL-SCNC: 105 MMOL/L (ref 98–107)
CREAT SERPL-MCNC: 0.63 MG/DL (ref 0.51–0.95)
DEPRECATED HCO3 PLAS-SCNC: 23 MMOL/L (ref 22–29)
EOSINOPHIL # BLD AUTO: 0.1 10E3/UL (ref 0–0.7)
EOSINOPHIL NFR BLD AUTO: 1 %
ERYTHROCYTE [DISTWIDTH] IN BLOOD BY AUTOMATED COUNT: 14.4 % (ref 10–15)
GFR SERPL CREATININE-BSD FRML MDRD: >90 ML/MIN/1.73M2
GLUCOSE 1H P 50 G GLC PO SERPL-MCNC: 135 MG/DL (ref 70–129)
GLUCOSE SERPL-MCNC: 133 MG/DL (ref 70–99)
HCT VFR BLD AUTO: 38.6 % (ref 35–47)
HGB BLD-MCNC: 12.8 G/DL (ref 11.7–15.7)
IMM GRANULOCYTES # BLD: 0 10E3/UL
IMM GRANULOCYTES NFR BLD: 0 %
LYMPHOCYTES # BLD AUTO: 1.5 10E3/UL (ref 0.8–5.3)
LYMPHOCYTES NFR BLD AUTO: 26 %
MCH RBC QN AUTO: 31.5 PG (ref 26.5–33)
MCHC RBC AUTO-ENTMCNC: 33.2 G/DL (ref 31.5–36.5)
MCV RBC AUTO: 95 FL (ref 78–100)
MONOCYTES # BLD AUTO: 0.3 10E3/UL (ref 0–1.3)
MONOCYTES NFR BLD AUTO: 5 %
NEUTROPHILS # BLD AUTO: 4.1 10E3/UL (ref 1.6–8.3)
NEUTROPHILS NFR BLD AUTO: 68 %
NRBC # BLD AUTO: 0 10E3/UL
NRBC BLD AUTO-RTO: 0 /100
PLATELET # BLD AUTO: 168 10E3/UL (ref 150–450)
POTASSIUM SERPL-SCNC: 3.7 MMOL/L (ref 3.4–5.3)
PROT SERPL-MCNC: 6.8 G/DL (ref 6.4–8.3)
RBC # BLD AUTO: 4.06 10E6/UL (ref 3.8–5.2)
SODIUM SERPL-SCNC: 139 MMOL/L (ref 136–145)
TSH SERPL DL<=0.005 MIU/L-ACNC: 2.38 UIU/ML (ref 0.3–4.2)
WBC # BLD AUTO: 6 10E3/UL (ref 4–11)

## 2023-05-11 PROCEDURE — 80053 COMPREHEN METABOLIC PANEL: CPT | Mod: ZL | Performed by: OBSTETRICS & GYNECOLOGY

## 2023-05-11 PROCEDURE — 84443 ASSAY THYROID STIM HORMONE: CPT | Mod: ZL | Performed by: OBSTETRICS & GYNECOLOGY

## 2023-05-11 PROCEDURE — 99207 PR OB VISIT-NO CHARGE - GICH ONLY: CPT | Performed by: OBSTETRICS & GYNECOLOGY

## 2023-05-11 PROCEDURE — 82950 GLUCOSE TEST: CPT | Mod: ZL | Performed by: OBSTETRICS & GYNECOLOGY

## 2023-05-11 PROCEDURE — 85025 COMPLETE CBC W/AUTO DIFF WBC: CPT | Mod: ZL | Performed by: OBSTETRICS & GYNECOLOGY

## 2023-05-11 PROCEDURE — 76816 OB US FOLLOW-UP PER FETUS: CPT

## 2023-05-11 PROCEDURE — 86780 TREPONEMA PALLIDUM: CPT | Mod: ZL | Performed by: OBSTETRICS & GYNECOLOGY

## 2023-05-11 PROCEDURE — 36415 COLL VENOUS BLD VENIPUNCTURE: CPT | Mod: ZL | Performed by: OBSTETRICS & GYNECOLOGY

## 2023-05-11 ASSESSMENT — PAIN SCALES - GENERAL: PAINLEVEL: NO PAIN (0)

## 2023-05-11 NOTE — NURSING NOTE
Chief Complaint   Patient presents with     Prenatal Care     25w1d       Medication Reconciliation: complete   Offers no concerns and states baby is nice active.     Melva Colindres LPN........................5/11/2023  9:35 AM

## 2023-05-11 NOTE — PROGRESS NOTES
CC: Recheck OB visit at 25w1d    HPI: Felix Hagan presents for a routine OB visit now at 25w1d  Concerns: Notices a little more thirst than normal.  Patient notices normal fetal movement, denies contractions, vaginal bleeding or leaking of fluid.    OB History    Para Term  AB Living   4 2 2 0 1 2   SAB IAB Ectopic Multiple Live Births   0 0 0 0 2      # Outcome Date GA Lbr Femi/2nd Weight Sex Delivery Anes PTL Lv   4 Current            3 Term 21 37w1d  3.189 kg (7 lb 0.5 oz) M CS-LTranv   LES      Complications: Preeclampsia/Hypertension      Name: LOIS ARGUETA-FELIX      Apgar1: 8  Apgar5: 8   2 AB 19 18w1d   M          Birth Comments: Fetal Hydrops   1 Term 18   3.201 kg (7 lb 0.9 oz) M CS-LVertical Gen  LES      Complications: Fetal Intolerance, Failure to Progress in First Stage      Name: ERIC ARGUETA      Apgar1: 7  Apgar5: 8     Current Outpatient Medications   Medication     albuterol (PROAIR HFA/PROVENTIL HFA/VENTOLIN HFA) 108 (90 Base) MCG/ACT inhaler     aspirin (ASA) 81 MG EC tablet     Prenatal Vit-Fe Fumarate-FA (PRENATAL MULTIVITAMIN W/IRON) 27-0.8 MG tablet     No current facility-administered medications for this visit.         O: /68 (BP Location: Right arm, Patient Position: Sitting, Cuff Size: Adult Regular)   Pulse 88   Wt 65.8 kg (145 lb)   LMP 2022 (Approximate)   BMI 24.89 kg/m    Body mass index is 24.89 kg/m .  See OB flow sheet  EXAM:  NAD  US images reviewed with her and Dr. Santos.    Results for orders placed or performed during the hospital encounter of 23   US OB >14 Weeks Follow Up     Status: None    Narrative    OB ULTRASOUND - Transabdominal     Clinical: Pleural effusion, fetal, affecting care of mother,  antepartum.   Gestation: 1  Comparison: 2023  Presentation: Cephalic  Cardiac Activity: Regular at 150 bpm  Movement: Yes  Placenta: Anterior ndGndrndanddndend:nd nd2nd Previa: No Previa  Cervix: 5.0 cm in length  Amniotic Fluid: Normal  MARCO: 15 cm    The fetus is left chest down. No right thoracic shunt is seen. No  definite left thoracic shunt is seen. Shunt material is seen in the  lower portion of the gestational sac, unclear if one or 2 shunts.  Bilateral pleural effusions are small, significantly improved when  compared to priors.      Impression    IMPRESSION: Single viable intrauterine pregnancy.     Pleural shunt catheters are not visualized in the chest, although  visualization of the left side is limited by fetal positioning.  Catheter material is seen in the low gestational sac. Pleural  effusions remain significantly improved when compared to priors such  as 4/18/2023. Recommend continued short interval follow-up.    DALLAS GAMEZ MD         SYSTEM ID:  [id number]   Results for orders placed or performed in visit on 05/11/23   Glucose tolerance, gest screen, 1 hour     Status: Abnormal   Result Value Ref Range    Glu Gest Screen 1hr 50g 135 (H) 70 - 129 mg/dL    Narrative    This is a screening test for Gestational Diabetes Mellitus.   If results are 130 mg/dL or greater, a Standard 100 gram Gestational  3 hour Glucose Tolerance should be performed.   TSH Reflex [facility]     Status: Normal   Result Value Ref Range    TSH 2.38 0.30 - 4.20 uIU/mL   Comprehensive Metabolic Panel     Status: Abnormal   Result Value Ref Range    Sodium 139 136 - 145 mmol/L    Potassium 3.7 3.4 - 5.3 mmol/L    Chloride 105 98 - 107 mmol/L    Carbon Dioxide (CO2) 23 22 - 29 mmol/L    Anion Gap 11 7 - 15 mmol/L    Urea Nitrogen 5.3 (L) 6.0 - 20.0 mg/dL    Creatinine 0.63 0.51 - 0.95 mg/dL    Calcium 9.0 8.6 - 10.0 mg/dL    Glucose 133 (H) 70 - 99 mg/dL    Alkaline Phosphatase 69 35 - 104 U/L    AST 18 10 - 35 U/L    ALT 11 10 - 35 U/L    Protein Total 6.8 6.4 - 8.3 g/dL    Albumin 4.1 3.5 - 5.2 g/dL    Bilirubin Total 1.1 <=1.2 mg/dL    GFR Estimate >90 >60 mL/min/1.73m2   CBC with platelets and differential     Status: None   Result Value Ref Range    WBC Count  6.0 4.0 - 11.0 10e3/uL    RBC Count 4.06 3.80 - 5.20 10e6/uL    Hemoglobin 12.8 11.7 - 15.7 g/dL    Hematocrit 38.6 35.0 - 47.0 %    MCV 95 78 - 100 fL    MCH 31.5 26.5 - 33.0 pg    MCHC 33.2 31.5 - 36.5 g/dL    RDW 14.4 10.0 - 15.0 %    Platelet Count 168 150 - 450 10e3/uL    % Neutrophils 68 %    % Lymphocytes 26 %    % Monocytes 5 %    % Eosinophils 1 %    % Basophils 0 %    % Immature Granulocytes 0 %    NRBCs per 100 WBC 0 <1 /100    Absolute Neutrophils 4.1 1.6 - 8.3 10e3/uL    Absolute Lymphocytes 1.5 0.8 - 5.3 10e3/uL    Absolute Monocytes 0.3 0.0 - 1.3 10e3/uL    Absolute Eosinophils 0.1 0.0 - 0.7 10e3/uL    Absolute Basophils 0.0 0.0 - 0.2 10e3/uL    Absolute Immature Granulocytes 0.0 <=0.4 10e3/uL    Absolute NRBCs 0.0 10e3/uL   CBC and Differential     Status: None    Narrative    The following orders were created for panel order CBC and Differential.  Procedure                               Abnormality         Status                     ---------                               -----------         ------                     CBC with platelets and d...[[id number]]                      Final result                 Please view results for these tests on the individual orders.         No results found for any visits on 05/11/23.    A/P: (Z34.82) Normal pregnancy in multigravida in second trimester  (primary encounter diagnosis)  Comment:   Plan: Glucose tolerance, gest screen, 1 hour,         Treponema Ab w Reflex to RPR and Titer, CBC and        Differential, Comprehensive Metabolic Panel            (E03.8) Other specified hypothyroidism  Comment:   Plan: TSH Reflex [facility]              Recheck in 1 week  F/u US requested here on Tuesday and [facility] on Friday next week.    Problem List:   Pregnancy risk factors include:  Hx of preeclampsia  Hx of gestational DI- monitor for polydipsia, polyuria  Hx of hydrops with fetal demise in mid-trimester  S/p fetal pleuro-amnionic shunts. Plan [place]  delivery    Seth Morfin MD FACOG  9:45 AM 5/11/2023

## 2023-05-12 LAB — T PALLIDUM AB SER QL: NONREACTIVE

## 2023-05-16 ENCOUNTER — ANESTHESIA EVENT (OUTPATIENT)
Dept: SURGERY | Facility: CLINIC | Age: 28
End: 2023-05-16
Payer: COMMERCIAL

## 2023-05-16 ENCOUNTER — HOSPITAL ENCOUNTER (OUTPATIENT)
Dept: ULTRASOUND IMAGING | Facility: OTHER | Age: 28
Discharge: HOME | End: 2023-05-16
Attending: OBSTETRICS & GYNECOLOGY
Payer: COMMERCIAL

## 2023-05-16 ENCOUNTER — ALLIED HEALTH/NURSE VISIT (OUTPATIENT)
Dept: OBGYN | Facility: OTHER | Age: 28
End: 2023-05-16
Attending: OBSTETRICS & GYNECOLOGY
Payer: COMMERCIAL

## 2023-05-16 DIAGNOSIS — O35.9XX0 FETAL ABNORMALITY AFFECTING MANAGEMENT OF MOTHER: Primary | ICD-10-CM

## 2023-05-16 DIAGNOSIS — O35.CXX0 PLEURAL EFFUSION, FETAL, AFFECTING CARE OF MOTHER, ANTEPARTUM: ICD-10-CM

## 2023-05-16 PROCEDURE — 96372 THER/PROPH/DIAG INJ SC/IM: CPT | Performed by: OBSTETRICS & GYNECOLOGY

## 2023-05-16 PROCEDURE — 250N000011 HC RX IP 250 OP 636: Performed by: OBSTETRICS & GYNECOLOGY

## 2023-05-16 PROCEDURE — 76816 OB US FOLLOW-UP PER FETUS: CPT

## 2023-05-16 RX ORDER — BETAMETHASONE SODIUM PHOSPHATE AND BETAMETHASONE ACETATE 3; 3 MG/ML; MG/ML
12 INJECTION, SUSPENSION INTRA-ARTICULAR; INTRALESIONAL; INTRAMUSCULAR; SOFT TISSUE EVERY 24 HOURS
Qty: 4 ML | Refills: 0 | Status: ON HOLD
Start: 2023-05-16 | End: 2023-06-07

## 2023-05-16 RX ORDER — BETAMETHASONE SODIUM PHOSPHATE AND BETAMETHASONE ACETATE 3; 3 MG/ML; MG/ML
12 INJECTION, SUSPENSION INTRA-ARTICULAR; INTRALESIONAL; INTRAMUSCULAR; SOFT TISSUE ONCE
Status: COMPLETED | OUTPATIENT
Start: 2023-05-17 | End: 2023-05-17

## 2023-05-16 RX ORDER — BETAMETHASONE SODIUM PHOSPHATE AND BETAMETHASONE ACETATE 3; 3 MG/ML; MG/ML
12 INJECTION, SUSPENSION INTRA-ARTICULAR; INTRALESIONAL; INTRAMUSCULAR; SOFT TISSUE ONCE
Status: COMPLETED | OUTPATIENT
Start: 2023-05-16 | End: 2023-05-16

## 2023-05-16 RX ADMIN — BETAMETHASONE SODIUM PHOSPHATE AND BETAMETHASONE ACETATE 12 MG: 3; 3 INJECTION, SUSPENSION INTRA-ARTICULAR; INTRALESIONAL; INTRAMUSCULAR at 13:51

## 2023-05-16 NOTE — PROGRESS NOTES
Patient presents for betamethasone injection. Patient tolerated well. Advised to wait 15 minutes after injection to monitor for any reaction.  Ana Dillon RN on 5/16/2023 at 2:14 PM

## 2023-05-16 NOTE — PROGRESS NOTES
[facility] called with concern for worsening ascites and pleural effusions on U/S today. Dr. Santos looked at U/S. Pt had U/S apt moved to 5/18 at 11:00 am. Pt will be NPO. Pt will have [facility] 12:30 [facility] on 5/18.  Pt aware of plan of care. [facility] aware of plan for Betamethasone today and tomorrow. Radha Cotto RN

## 2023-05-17 ENCOUNTER — ALLIED HEALTH/NURSE VISIT (OUTPATIENT)
Dept: OBGYN | Facility: OTHER | Age: 28
End: 2023-05-17
Attending: OBSTETRICS & GYNECOLOGY
Payer: COMMERCIAL

## 2023-05-17 DIAGNOSIS — O35.9XX0 FETAL ABNORMALITY AFFECTING MANAGEMENT OF MOTHER, SINGLE OR UNSPECIFIED FETUS: Primary | ICD-10-CM

## 2023-05-17 PROCEDURE — 250N000011 HC RX IP 250 OP 636: Performed by: OBSTETRICS & GYNECOLOGY

## 2023-05-17 PROCEDURE — 96372 THER/PROPH/DIAG INJ SC/IM: CPT | Performed by: OBSTETRICS & GYNECOLOGY

## 2023-05-17 RX ORDER — ONDANSETRON 4 MG/1
4 TABLET, ORALLY DISINTEGRATING ORAL EVERY 30 MIN PRN
Status: CANCELLED | OUTPATIENT
Start: 2023-05-17

## 2023-05-17 RX ORDER — LABETALOL HYDROCHLORIDE 5 MG/ML
10 INJECTION, SOLUTION INTRAVENOUS
Status: CANCELLED | OUTPATIENT
Start: 2023-05-17

## 2023-05-17 RX ORDER — ONDANSETRON 2 MG/ML
4 INJECTION INTRAMUSCULAR; INTRAVENOUS EVERY 30 MIN PRN
Status: CANCELLED | OUTPATIENT
Start: 2023-05-17

## 2023-05-17 RX ORDER — FENTANYL CITRATE-0.9 % NACL/PF 10 MCG/ML
100 PLASTIC BAG, INJECTION (ML) INTRAVENOUS EVERY 5 MIN PRN
Status: CANCELLED | OUTPATIENT
Start: 2023-05-17

## 2023-05-17 RX ORDER — SODIUM CHLORIDE, SODIUM LACTATE, POTASSIUM CHLORIDE, CALCIUM CHLORIDE 600; 310; 30; 20 MG/100ML; MG/100ML; MG/100ML; MG/100ML
INJECTION, SOLUTION INTRAVENOUS CONTINUOUS
Status: CANCELLED | OUTPATIENT
Start: 2023-05-17

## 2023-05-17 RX ORDER — NALBUPHINE HYDROCHLORIDE 10 MG/ML
2.5-5 INJECTION, SOLUTION INTRAMUSCULAR; INTRAVENOUS; SUBCUTANEOUS EVERY 6 HOURS PRN
Status: CANCELLED | OUTPATIENT
Start: 2023-05-17

## 2023-05-17 RX ORDER — BUPIVACAINE HYDROCHLORIDE 7.5 MG/ML
1.6 INJECTION, SOLUTION EPIDURAL; RETROBULBAR ONCE
Status: CANCELLED | OUTPATIENT
Start: 2023-05-17 | End: 2023-05-17

## 2023-05-17 RX ORDER — HYDROMORPHONE HCL IN WATER/PF 6 MG/30 ML
0.4 PATIENT CONTROLLED ANALGESIA SYRINGE INTRAVENOUS EVERY 5 MIN PRN
Status: CANCELLED | OUTPATIENT
Start: 2023-05-17

## 2023-05-17 RX ORDER — HYDROMORPHONE HCL IN WATER/PF 6 MG/30 ML
0.2 PATIENT CONTROLLED ANALGESIA SYRINGE INTRAVENOUS EVERY 5 MIN PRN
Status: CANCELLED | OUTPATIENT
Start: 2023-05-17

## 2023-05-17 RX ORDER — ACETAMINOPHEN 325 MG/1
975 TABLET ORAL ONCE
Status: CANCELLED | OUTPATIENT
Start: 2023-05-17 | End: 2023-05-17

## 2023-05-17 RX ORDER — DIMENHYDRINATE 50 MG/ML
25 INJECTION, SOLUTION INTRAMUSCULAR; INTRAVENOUS
Status: CANCELLED | OUTPATIENT
Start: 2023-05-17

## 2023-05-17 RX ORDER — FENTANYL CITRATE 50 UG/ML
25 INJECTION, SOLUTION INTRAMUSCULAR; INTRAVENOUS EVERY 5 MIN PRN
Status: CANCELLED | OUTPATIENT
Start: 2023-05-17

## 2023-05-17 RX ORDER — FENTANYL CITRATE 50 UG/ML
50 INJECTION, SOLUTION INTRAMUSCULAR; INTRAVENOUS EVERY 5 MIN PRN
Status: CANCELLED | OUTPATIENT
Start: 2023-05-17

## 2023-05-17 RX ADMIN — BETAMETHASONE SODIUM PHOSPHATE AND BETAMETHASONE ACETATE 12 MG: 3; 3 INJECTION, SUSPENSION INTRA-ARTICULAR; INTRALESIONAL; INTRAMUSCULAR at 13:39

## 2023-05-17 NOTE — ANESTHESIA PREPROCEDURE EVALUATION
Anesthesia Pre-Procedure Evaluation    Patient: Paula Hagan   MRN: [id number] : 1995        Procedure : Procedure(s):  INSERTION, SHUNT, THORACOAMNIOTIC-Fetal left side shunt          Past Medical History:   Diagnosis Date     Gestational diabetes      History of diabetes insipidus     in first pregnancy     History of diet controlled gestational diabetes mellitus (GDM)     first pregnancy     History of pre-eclampsia     first pregnancy     PID (pelvic inflammatory disease)      Uncomplicated asthma      Varicella       Past Surgical History:   Procedure Laterality Date      SECTION  2018      SECTION N/A 2021    Procedure:  SECTION;  Surgeon: Seth Morfin MD;  Location: [facility] OR     INSERT FETAL THORACOAMNIOTIC SHUNT N/A 2023    Procedure: INSERTION, SHUNT, THORACOAMNIOTIC  Plan for thoracentesis only 23. Fluid will be sent to lab. No shunt placement per MD.;  Surgeon: Abrahan Santos MD;  Location: [facility] L[facility]D     INSERT FETAL THORACOAMNIOTIC SHUNT Bilateral 2023    Procedure: INSERTION, SHUNT, THORACOAMNIOTIC BILATERAL;  Surgeon: Abrahan Santos MD;  Location: [facility]      Allergies   Allergen Reactions     Bee Venom Anaphylaxis     Morphine Itching and Hives     Tolerates HYDROmorphone (DILUDID)  Tolerates HYDROmorphone (DILUDID)     Buspirone Other (See Comments)     Headache, migraine  migraines     Sertraline Muscle Pain (Myalgia)      Social History     Tobacco Use     Smoking status: Never     Smokeless tobacco: Never   Vaping Use     Vaping status: Never Used   Substance Use Topics     Alcohol use: Not Currently      Wt Readings from Last 1 Encounters:   23 65.8 kg (145 lb)        Anesthesia Evaluation   Pt has had prior anesthetic. Type: Regional.    No history of anesthetic complications       ROS/MED HX  ENT/Pulmonary:     (+) Intermittent, asthma     Neurologic:  - neg neurologic ROS     Cardiovascular:     (+) --PIH ---     METS/Exercise Tolerance:     Hematologic:  - neg hematologic  ROS     Musculoskeletal:       GI/Hepatic:     (+) GERD,     Renal/Genitourinary:       Endo: Comment: Patient with history of DI in addition to diet controlled GDM in prior pregnancies.    (+) gestational diabetes and Diet- controlled,    Psychiatric/Substance Use:  - neg psychiatric ROS     Infectious Disease:       Malignancy:       Other:   H/o fetal demise at 18 weeks in prior pregnancy, fetus with fetal hydrops.  Current pregnancy, fetus also with hydrops   (+) , previous ,            OUTSIDE LABS:  CBC:   Lab Results   Component Value Date    WBC 6.0 2023    WBC 10.7 2023    HGB 12.8 2023    HGB 12.1 2023    HCT 38.6 2023    HCT 37.0 2023     2023     2023     BMP:   Lab Results   Component Value Date     2023     2023    POTASSIUM 3.7 2023    POTASSIUM 3.8 2023    CHLORIDE 105 2023    CHLORIDE 104 2023    CO2 23 2023    CO2 25 2023    BUN 5.3 (L) 2023    BUN 8.0 2023    CR 0.63 2023    CR 0.61 2023     (H) 2023    GLC 86 2023     COAGS:   Lab Results   Component Value Date    INR 0.81 2019     POC:   Lab Results   Component Value Date    HCG Negative 2022     HEPATIC:   Lab Results   Component Value Date    ALBUMIN 4.1 2023    PROTTOTAL 6.8 2023    ALT 11 2023    AST 18 2023    ALKPHOS 69 2023    BILITOTAL 1.1 2023     OTHER:   Lab Results   Component Value Date    LYDIA 9.0 2023    TSH 2.38 2023    T4 0.69 2020       Anesthesia Plan    ASA Status:  2                    Consents            Postoperative Care            Comments:                Andrés Major MD   Unknown

## 2023-05-18 ENCOUNTER — ANESTHESIA (OUTPATIENT)
Dept: SURGERY | Facility: CLINIC | Age: 28
End: 2023-05-18
Payer: COMMERCIAL

## 2023-05-18 ENCOUNTER — HOSPITAL ENCOUNTER (OUTPATIENT)
Dept: ULTRASOUND IMAGING | Facility: CLINIC | Age: 28
Discharge: HOME | End: 2023-05-18
Attending: STUDENT IN AN ORGANIZED HEALTH CARE EDUCATION/TRAINING PROGRAM
Payer: COMMERCIAL

## 2023-05-18 ENCOUNTER — OFFICE VISIT (OUTPATIENT)
Dept: MATERNAL FETAL MEDICINE | Facility: CLINIC | Age: 28
End: 2023-05-18
Attending: STUDENT IN AN ORGANIZED HEALTH CARE EDUCATION/TRAINING PROGRAM
Payer: COMMERCIAL

## 2023-05-18 DIAGNOSIS — O35.CXX0 PLEURAL EFFUSION, FETAL, AFFECTING CARE OF MOTHER, ANTEPARTUM: Primary | ICD-10-CM

## 2023-05-18 DIAGNOSIS — O36.22X0 HYDROPS FETALIS IN SECOND TRIMESTER, SINGLE OR UNSPECIFIED FETUS: ICD-10-CM

## 2023-05-18 DIAGNOSIS — O09.292 PRIOR POOR OBSTETRICAL HISTORY IN SECOND TRIMESTER, ANTEPARTUM: ICD-10-CM

## 2023-05-18 PROCEDURE — 76816 OB US FOLLOW-UP PER FETUS: CPT

## 2023-05-18 PROCEDURE — 76816 OB US FOLLOW-UP PER FETUS: CPT | Mod: 26 | Performed by: OBSTETRICS & GYNECOLOGY

## 2023-05-25 ENCOUNTER — HOSPITAL ENCOUNTER (OUTPATIENT)
Dept: ULTRASOUND IMAGING | Facility: OTHER | Age: 28
Discharge: HOME | End: 2023-05-25
Attending: OBSTETRICS & GYNECOLOGY
Payer: COMMERCIAL

## 2023-05-25 ENCOUNTER — PRENATAL OFFICE VISIT (OUTPATIENT)
Dept: OBGYN | Facility: OTHER | Age: 28
End: 2023-05-25
Attending: OBSTETRICS & GYNECOLOGY
Payer: COMMERCIAL

## 2023-05-25 VITALS
BODY MASS INDEX: 25.97 KG/M2 | WEIGHT: 151.3 LBS | DIASTOLIC BLOOD PRESSURE: 82 MMHG | SYSTOLIC BLOOD PRESSURE: 128 MMHG | HEART RATE: 96 BPM | OXYGEN SATURATION: 99 %

## 2023-05-25 DIAGNOSIS — O35.CXX0 PLEURAL EFFUSION, FETAL, AFFECTING CARE OF MOTHER, ANTEPARTUM: ICD-10-CM

## 2023-05-25 DIAGNOSIS — Z34.82 NORMAL PREGNANCY IN MULTIGRAVIDA IN SECOND TRIMESTER: Primary | ICD-10-CM

## 2023-05-25 PROCEDURE — 76816 OB US FOLLOW-UP PER FETUS: CPT

## 2023-05-25 PROCEDURE — 99207 PR OB VISIT-NO CHARGE - GICH ONLY: CPT | Performed by: OBSTETRICS & GYNECOLOGY

## 2023-05-25 PROCEDURE — 59025 FETAL NON-STRESS TEST: CPT | Performed by: OBSTETRICS & GYNECOLOGY

## 2023-05-25 ASSESSMENT — PAIN SCALES - GENERAL: PAINLEVEL: NO PAIN (0)

## 2023-05-25 NOTE — NURSING NOTE
Patient presents to clinic for OB visit, patient is 27 weeks 1 day  No concerns  /82   Pulse 96   Wt 68.6 kg (151 lb 4.8 oz)   LMP 11/16/2022 (Approximate)   SpO2 99%   BMI 25.97 kg/m    Valarie De Jesus LPN on 5/25/2023 at 3:44 PM

## 2023-05-25 NOTE — PROGRESS NOTES
CC: Recheck OB visit at 27w1d    HPI: Felix Hagan presents for a routine OB visit now at 27w1d  Concerns: Denies  Patient notices normal fetal movement, denies contractions, vaginal bleeding or leaking of fluid.    OB History    Para Term  AB Living   4 2 2 0 1 2   SAB IAB Ectopic Multiple Live Births   0 0 0 0 2      # Outcome Date GA Lbr Femi/2nd Weight Sex Delivery Anes PTL Lv   4 Current            3 Term 21 37w1d  3.189 kg (7 lb 0.5 oz) M CS-LTranv   LES      Complications: Preeclampsia/Hypertension      Name: LOIS ARGUETA-FELIX      Apgar1: 8  Apgar5: 8   2 AB 19 18w1d   M          Birth Comments: Fetal Hydrops   1 Term 18   3.201 kg (7 lb 0.9 oz) M CS-LVertical Gen  LES      Complications: Fetal Intolerance, Failure to Progress in First Stage      Name: ERIC ARGUETA      Apgar1: 7  Apgar5: 8     Current Outpatient Medications   Medication     albuterol (PROAIR HFA/PROVENTIL HFA/VENTOLIN HFA) 108 (90 Base) MCG/ACT inhaler     aspirin (ASA) 81 MG EC tablet     betamethasone acet & sod phos (CELESTONE) 6 (3-3) MG/ML SUSP injection     Prenatal Vit-Fe Fumarate-FA (PRENATAL MULTIVITAMIN W/IRON) 27-0.8 MG tablet     No current facility-administered medications for this visit.     O: /82   Pulse 96   Wt 68.6 kg (151 lb 4.8 oz)   LMP 2022 (Approximate)   SpO2 99%   BMI 25.97 kg/m    Body mass index is 25.97 kg/m .  See OB flow sheet  EXAM:  NAD  NST doucmentation:    Indication: (Z34.82) Normal pregnancy in multigravida in second trimester  (primary encounter diagnosis)  Comment:   Plan: FETAL NON-STRESS TEST, TDAP VACCINE (Adacel,         Boostrix)  [[id number]]          Duration: 30 min     27w1d    FHR baseline: 150 with moderate variability  Accelerations: y  Decelerations: n  Contractions: n    Category 1    Results for orders placed or performed during the hospital encounter of 23   US OB >14 Weeks Follow Up     Status: None    Narrative    OB ULTRASOUND -  Transabdominal     Clinical: Pleural effusion, fetal, affecting care of mother,  antepartum.   Gestation: 1  Comparison: 5/18/2023  Presentation: Cephalic  Cardiac Activity: Regular at 146 bpm  Movement: Yes  Placenta: Anterior ndGndrndanddndend:nd nd2nd Previa: No Previa  Cervix: 4 cm in length  Amniotic Fluid: Normal MARCO: 17 cm    The previously described pleural effusions are again improved,  although not completely resolved, when compared to 5/18/2023 and  5/16/2023. Mild ascites is again seen. A small left scrotal hydrocele  is noted. Shunt material is again seen within the gestational sac.      Impression    IMPRESSION: Single viable intrauterine pregnancy demonstrating  improved pleural effusions when compared to recent priors.    DALLAS GAMEZ MD         SYSTEM ID:  [id number]       A/P: (Z34.82) Normal pregnancy in multigravida in second trimester  (primary encounter diagnosis)  Comment:   Plan:     Recheck in 1 week at [facility], with me in two weeks.    Problem List:   Pregnancy risk factors include:  Hx of preeclampsia  Hx of gestational DI- monitor for polydipsia, polyuria  Hx of hydrops with fetal demise in mid-trimester  S/p fetal pleuro-amnionic shunts. Plan [place] delivery     Seth Morfin MD FACOG  3:36 PM 5/25/2023

## 2023-06-01 ENCOUNTER — OFFICE VISIT (OUTPATIENT)
Dept: MATERNAL FETAL MEDICINE | Facility: CLINIC | Age: 28
End: 2023-06-01
Attending: OBSTETRICS & GYNECOLOGY
Payer: COMMERCIAL

## 2023-06-01 ENCOUNTER — HOSPITAL ENCOUNTER (OUTPATIENT)
Dept: ULTRASOUND IMAGING | Facility: CLINIC | Age: 28
Discharge: HOME | End: 2023-06-01
Attending: STUDENT IN AN ORGANIZED HEALTH CARE EDUCATION/TRAINING PROGRAM
Payer: COMMERCIAL

## 2023-06-01 VITALS
RESPIRATION RATE: 18 BRPM | HEART RATE: 71 BPM | SYSTOLIC BLOOD PRESSURE: 127 MMHG | BODY MASS INDEX: 25.97 KG/M2 | DIASTOLIC BLOOD PRESSURE: 87 MMHG | WEIGHT: 151.3 LBS | OXYGEN SATURATION: 100 %

## 2023-06-01 DIAGNOSIS — O35.CXX0 PLEURAL EFFUSION, FETAL, AFFECTING CARE OF MOTHER, ANTEPARTUM: Primary | ICD-10-CM

## 2023-06-01 PROCEDURE — 99215 OFFICE O/P EST HI 40 MIN: CPT | Performed by: STUDENT IN AN ORGANIZED HEALTH CARE EDUCATION/TRAINING PROGRAM

## 2023-06-01 PROCEDURE — 76816 OB US FOLLOW-UP PER FETUS: CPT | Mod: 26 | Performed by: STUDENT IN AN ORGANIZED HEALTH CARE EDUCATION/TRAINING PROGRAM

## 2023-06-01 PROCEDURE — 99213 OFFICE O/P EST LOW 20 MIN: CPT | Mod: 25 | Performed by: STUDENT IN AN ORGANIZED HEALTH CARE EDUCATION/TRAINING PROGRAM

## 2023-06-01 PROCEDURE — 76816 OB US FOLLOW-UP PER FETUS: CPT

## 2023-06-01 ASSESSMENT — PAIN SCALES - GENERAL: PAINLEVEL: NO PAIN (0)

## 2023-06-01 NOTE — PROGRESS NOTES
Maternal-Fetal Medicine   First OB Visit    Felix Hagan  : 1995  MRN: [id number]    Felix Hagan is a shared care visit from [facility] by Dr. Morfin.     HPI:  Felix Hagan is a 28 year old  at 28w1d by LMP c/w 10 week sono here for shared care visit.    She is here with her mom.    Felix has been overall doing well.  She denies any concerns including vaginal bleeding, LOF, painful contractions.  Denies fever, chills, headache, chest pain, shortness of breath, leg swelling.  Active fetal movement.  No additional concerns.    Pregnancy complicated by:  1.  Fetal bilateral pleural effusions s/p bilateral thoracentesis 23 and thoracoamniotic shunt bilaterally on 23.  Has had amniocentesis and evaluation of thoracic fluid for chylothorax (negative).  2.  Prior  section x 2  3.  History of cystic hygroma with 18 week fetal demise in prior pregnancy  4.  History of gestational hypertension in 2 prior pregnancies (never received magnesium, urine protein creatinine ratio <0.3, mild range Bps)  5. History of gestational diabetes insipidus in first pregnancy not in second pregnancy. She denies history of gestational diabetes.  Has not had any concerning symptoms this pregnancy.     Obstetrics History:  OB History    Para Term  AB Living   4 2 2 0 1 2   SAB IAB Ectopic Multiple Live Births   0 0 0 0 2      # Outcome Date GA Lbr Femi/2nd Weight Sex Delivery Anes PTL Lv   4 Current            3 Term 21 37w1d  3.189 kg (7 lb 0.5 oz) M CS-LTranv   LES      Complications: Preeclampsia/Hypertension      Name: LOIS ARGUETA-FELIX      Apgar1: 8  Apgar5: 8   2 AB 19 18w1d   M          Birth Comments: Fetal Hydrops   1 Term 18   3.201 kg (7 lb 0.9 oz) M CS-LVertical Gen  LES      Complications: Fetal Intolerance, Failure to Progress in First Stage      Name: ERIC ARGUETA      Apgar1: 7  Apgar5: 8       Gynecologic History:  - Menstrual history: Patient's last menstrual  period was 2022 (approximate).   - Last Pap: NILM   - Denies any history of abnormal pap smears  - Denies prior cervical surgery or procedures    Past Medical History:  Past Medical History:   Diagnosis Date     History of diabetes insipidus     in first pregnancy     History of gestational hypertension      PID (pelvic inflammatory disease)      Uncomplicated asthma      Varicella        Past Surgical History:  Past Surgical History:   Procedure Laterality Date      SECTION  2018      SECTION N/A 2021    Procedure:  SECTION;  Surgeon: Seth Morfin MD;  Location: [facility] OR     INSERT FETAL THORACOAMNIOTIC SHUNT N/A 2023    Procedure: INSERTION, SHUNT, THORACOAMNIOTIC  Plan for thoracentesis only 23. Fluid will be sent to lab. No shunt placement per MD.;  Surgeon: Abrahan Santos MD;  Location: [facility] L+D     INSERT FETAL THORACOAMNIOTIC SHUNT Bilateral 2023    Procedure: INSERTION, SHUNT, THORACOAMNIOTIC BILATERAL;  Surgeon: Abrahan Santos MD;  Location: [facility] L+D       Current Medications:  Current Outpatient Medications   Medication Instructions     albuterol (PROAIR HFA/PROVENTIL HFA/VENTOLIN HFA) 108 (90 Base) MCG/ACT inhaler 1-2 puffs, Inhalation, EVERY 4 HOURS PRN     aspirin (ASA) 81 mg, Oral, DAILY     Prenatal Vit-Fe Fumarate-FA (PRENATAL MULTIVITAMIN W/IRON) 27-0.8 MG tablet 1 tablet, Oral, DAILY       Allergies:  Bee venom, Morphine, Buspirone, and Sertraline    Social History:   Social History     Tobacco Use     Smoking status: Never     Smokeless tobacco: Never   Vaping Use     Vaping status: Never Used   Substance Use Topics     Alcohol use: Not Currently     Drug use: Never     Denies use of alcohol, drugs or smoking.    Family History:  Denies history of thromboembolic disease, bleeding disorders.    ROS:  10-point ROS negative except as in HPI     PHYSICAL EXAM:  /87 (BP Location: Left arm, Patient Position: Chair)   Pulse 71    Resp 18   Wt 68.6 kg (151 lb 4.8 oz)   LMP 2022 (Approximate)   SpO2 100%   BMI 25.97 kg/m      Gen: alert, oriented, appears well  Heart:  Regular rate   Lungs:  Non-labored  Abdomen:  Gravid, non-tender  Extremities: No edema    Current weight: 151 lb: pre-pregnancy weight: 144 lab; TW lb    Prenatal Labs:    23  AST 18, ALT 11, Creatinine 0.63  TSH 2.38  Hgb 12.8,   Treponema NR      23  HIV neg  Hepatitis C neg  Hepatitis B neg  A positive, antibody negative  NIPT low risk XY  Rubella immune  RPR neg    23  Urine culture mixed urogenital kurt  GC/CT negative    Amniocentesis for chromosomal microarray, whole exome sequencing, and for CMV/Parvovirus PCR negative.    Ultrasound:  Please see imaging tab for today's US report.    ASSESSMENT/PLAN:  Paula Hagan is a 28 year old  at 28w1d by LMP c/w 10 week sono here for shared care visit.    Pregnancy complicated by:  1.  Fetal bilateral pleural effusions s/p bilateral thoracentesis 23 and thoracoamniotic shunt bilaterally on 23.  Has had amniocentesis and evaluation of thoracic fluid for chylothorax (negative).  2.  Prior  section x 2  3.  History of cystic hygroma with 18 week fetal demise in prior pregnancy  4.  History of gestational hypertension in 2 prior pregnancies (never received magnesium, urine protein creatinine ratio <0.3, mild range BPs)  5. History of gestational diabetes insipidus in first pregnancy not in second pregnancy. She denies history of gestational diabetes.  Has not had any concerning symptoms this pregnancy.     Fetal bilateral pleural effusions s/p bilateral thoracentesis and shunt  - US stable since shunt placement, overall stable today without development of hydrops, given stable findings over the past several weeks will space out surveillance to q2 weeks, next in 2 weeks with Dr. Morfin and then in 4 weeks with US and return OB visit  - s/p BMTZ -  -  Amniocentesis for chromosomal microarray, whole exome sequencing, and for CMV/Parvovirus PCR negative.    History of gestational hypertension  - has baseline pre-eclampsia labs  - normotensive  - on ASA 81 mg  - signs/symptoms of pre-eclampsia reviewed    History of gestational diabetes inspidius  - denies history of gestational diabetes  - denies any concerns of polyuria or polydipsia  - has been off of Desmopressin since 2018 and states this was only associated with that pregnancy  - 1 hr , per patient this is below Dr. Morfin's threshold for 3 hr GTT    Prenatal care  - continued care locally with Dr. Morfin, next visit in 2 weeks  - return OB visit with MFM at 32 weeks   - plan for delivery at [facility]  - delivery timing pending fetal course   - Rh positive  - TDAP locally if desired  - hgb 5/11/23 = 12.8  -   - RPR 5/11/23 = NR    Patient seen and staffed with Dr. Hesham Kerns MD   Maternal-Fetal Medicine Fellow, PGY6  6/1/2023 1:36 PM    --------                                                                                                          MFM Physician Attestation  I saw this patient with the resident and agree with the resident/fellow's findings and plan of care as documented in the note.  I personally reviewed her vital signs, medications, labs, pertinent imaging, and fetal monitoring.     I personally spent a total of 25 minutes, including both face-to-face and non-face-to-face on the date of the encounter, addressing the above diagnosis. Activities performed in this time include chart review, obtaining / reviewing history, performing a medically necessary evaluation, documentation and counseling/care coordination/ordering tests/ordering meds.      Candis Dahl MD  Maternal Fetal Medicine   Date of Service (when I saw the patient): 6/1/2023

## 2023-06-01 NOTE — PATIENT INSTRUCTIONS
Kick Counts  It s normal to worry about your baby s health. One way you can know your baby s doing well is to record the baby s movements once a day. This is called a kick count.   Remember to take your kick count records to all your appointments with your healthcare provider.  How to count kicks    Time how long it takes you to feel 10 kicks, flutters, swishes, or rolls. Ideally, you want to feel at least 10 movements in 2 hours. You will likely feel 10 movements in less time than that.  Starting at 28 weeks, count your baby's movements daily. Follow your healthcare provider's instructions for kick counting. Here are tips for counting kicks:    Choose a time when the baby is active, such as after a meal.     Sit comfortably or lie on your side.     The first time the baby moves, write down the time.     Count each movement until the baby has moved  10 times. This can take from 20 minutes to 2 hours.     If you haven't felt 10 kicks by the end of the second hour, wait a few hours. Then try again.    Try to do it at the same time each day.  When to call your healthcare provider  Call your healthcare provider  right away if:    You do a couple sets of kick counts during the day and your baby moves fewer than 10 times in 2 hours.    Your baby moves much less often than on the days before.    You haven't felt your baby move all day.  [employer] last reviewed this educational content on 8/1/2020 2000-2022 [employer]. All rights reserved. This information is not intended as a substitute for professional medical care. Always follow your healthcare professional's instructions.        Counting Your Baby's Movements   Counting your unborn baby's movements will assure you of your baby's health.   The best time to count is when your baby is most active. Do it at the same time every day.  To keep track of your baby's movements, use the attached chart. Bring the chart with you to each clinic visit.  1. Do not  smoke for at least 2 hours before counting your baby's movements. (In fact, it's best to quit smoking if you're pregnant.)  2. Lie on your side. Put your hand on your baby.  3. Write the time you start counting movements.  4. Count the next 10 kicks, rolls, and other movements.  5. Write the time when your baby has finished 10 movements.  6. If you reach 10 movements within 2 hours, you're done for the day.  Call your care provider right away if:    You feel no movement for the first hour.    It takes more than 2 hours to feel 10 movements.    There's a change in the normal pattern of movements.  Your care provider's phone number is:   ________________________________________  The number to your hospitals center is:   ________________________________________     For informational purposes only. Not to replace the advice of your health care provider. Copyright   ,  [facility]. All rights reserved. Clinically reviewed by Delmy Valladares RN, [facility]. [employer] [id number] - REV 10/21.       Understanding  Labor  Going into labor before week 37 of pregnancy is called  labor.  labor can cause your baby to be born too soon. This can lead to health problems for your baby.     Before labor, the cervix is thick and closed.      In  labor, the cervix begins to efface (thin) and dilate (open).     Symptoms of  labor  If you think you re having  labor, get medical help right away. Contractions alone don t mean you re in  labor. What matters more are changes in your cervix. The cervix is the opening at the lower end of the uterus. Symptoms of  labor include:    4 or more contractions per hour    Strong contractions    Constant menstrual-like cramping    Low-back pain    Mucous or bloody fluid from the vagina    Bleeding or spotting in the second or third trimester  Evaluating  labor  Your healthcare provider will try to find  out if you re in  labor or just having contractions. They may watch you for a few hours. You may have these tests:    Pelvic exam. This is to see if your cervix has effaced (thinned) and dilated (opened).    Uterine activity monitoring. This is used to detect contractions.    Fetal monitoring. This is done to check the health of your baby.    Ultrasound. This test looks at your baby s size and position.    Amniocentesis. This test checks how mature your baby s lungs are.  Caring for yourself at home  If you have  contractions, but your cervix is still thick and closed, your healthcare provider may tell you to:    Drink plenty of water.    Do fewer activities.    Rest in bed on your side.    Don't have intercourse or stimulate your nipples.  When to call your healthcare provider  Call your healthcare provider if you have any of these:    4 or more contractions per hour    Bag of water breaks    Bleeding or spotting  If you need hospital care   labor often means that you need hospital care. You may need complete bed rest. You may have an IV (intravenous) line in your arm or hand. This is to give you fluids. You may be given pills or injections. These are done to help prevent contractions. You may get a medicine called a corticosteroid. This is to help your baby s lungs mature more quickly.  Are you at risk?  Any pregnant woman can have  labor. It may start for no reason. But these risk factors can increase your chances:    Past  labor or early birth    Smoking, drug, or alcohol use in pregnancy    A multiple pregnancy (twins or more)    Problems with the shape of the uterus    Bleeding during the pregnancy  The dangers of  birth  A baby born too soon may have health problems. This is because the baby didn t have enough time to grow. Some of the risks for your baby include:    Not breastfeeding or feeding well    Having immature lungs    Bleeding in the brain    Death  Reaching  term  Your goal is to get as close to term (week 37 or later) as you can before giving birth. The closer you get to term, the higher your chance of having a healthy baby. Work with your healthcare provider. Together, you can take steps that may keep you from giving birth too early.  [employer] last reviewed this educational content on 10/1/2021    1793-2713 [employer]. All rights reserved. This information is not intended as a substitute for professional medical care. Always follow your healthcare professional's instructions.          Pregnancy: Your Third Trimester Changes  As the baby grows, your body changes, too. You may also see signs that your body is getting ready for labor. Be patient. Within a few more weeks, your baby will be born.   How you are changing  Your body is preparing for the birth of your baby. Some of the most common changes are listed below. If you have any questions or concerns, ask your healthcare provider:     You ll gain more weight from fluids, extra blood, and fat deposits.    Your breasts will grow as your body gets ready to feed the baby. They may be more tender. You may also notice a slight yellow or white discharge from the nipples.    Discharge from your vagina may increase. This is normal.    You might see some skin color changes on your forehead, cheeks, or nose. Most of these will go away after you deliver.  How your baby is growing  Month 7  Your baby can open and close their eyes and weighs around 4 pounds (1.8 kg). If born prematurely (too early), your baby would likely survive with special care.     Month 8  Your baby is building up body fat and weighs around 6 pounds (2.7 kg).    Month 9  Your baby weighs nearly 7 pounds (3.2 kg) and is about 19 to 21 inches long. In other words, any day now...     [employer] last reviewed this educational content on 9/1/2021 2000-2022 [employer]. All rights reserved. This information is not intended as a  substitute for professional medical care. Always follow your healthcare professional's instructions.

## 2023-06-01 NOTE — NURSING NOTE
Paula seen in clinic today for follow up growth and hydrops check and prenatal visit at 28w1d gestation due to pregnancy c/b fetal pleural effusions (see report/notes). VSS. Pt reports + FM. Pt denies bldg/lof/change in discharge/contractions/headache/vision changes/chest pain/SOB/edema. Dr. Kerns met with pt and discussed POC. Plan: pt will received Tdap with Dr. Morfin.  Pt will space out visits. Return to [facility] in 4 weeks. Pt discharged stable and ambulatory.

## 2023-06-07 ENCOUNTER — HOSPITAL ENCOUNTER (OUTPATIENT)
Facility: OTHER | Age: 28
Discharge: HOME | End: 2023-06-07
Attending: OBSTETRICS & GYNECOLOGY | Admitting: OBSTETRICS & GYNECOLOGY
Payer: COMMERCIAL

## 2023-06-07 VITALS
OXYGEN SATURATION: 100 % | SYSTOLIC BLOOD PRESSURE: 125 MMHG | DIASTOLIC BLOOD PRESSURE: 78 MMHG | TEMPERATURE: 97 F | HEART RATE: 72 BPM

## 2023-06-07 DIAGNOSIS — N30.00 ACUTE CYSTITIS WITHOUT HEMATURIA: Primary | ICD-10-CM

## 2023-06-07 LAB
ALBUMIN UR-MCNC: NEGATIVE MG/DL
APPEARANCE UR: ABNORMAL
BACTERIA #/AREA URNS HPF: ABNORMAL /HPF
BILIRUB UR QL STRIP: NEGATIVE
COLOR UR AUTO: YELLOW
GLUCOSE UR STRIP-MCNC: NEGATIVE MG/DL
HGB UR QL STRIP: ABNORMAL
KETONES UR STRIP-MCNC: NEGATIVE MG/DL
LEUKOCYTE ESTERASE UR QL STRIP: ABNORMAL
NITRATE UR QL: NEGATIVE
PH UR STRIP: 6 [PH] (ref 5–9)
RBC URINE: 4 /HPF
SP GR UR STRIP: 1 (ref 1–1.03)
UROBILINOGEN UR STRIP-MCNC: NORMAL MG/DL
WBC CLUMPS #/AREA URNS HPF: PRESENT /HPF
WBC URINE: >182 /HPF

## 2023-06-07 PROCEDURE — 81001 URINALYSIS AUTO W/SCOPE: CPT | Performed by: OBSTETRICS & GYNECOLOGY

## 2023-06-07 PROCEDURE — 250N000013 HC RX MED GY IP 250 OP 250 PS 637: Performed by: OBSTETRICS & GYNECOLOGY

## 2023-06-07 PROCEDURE — G0463 HOSPITAL OUTPT CLINIC VISIT: HCPCS | Mod: 25

## 2023-06-07 RX ORDER — AMOXICILLIN 500 MG/1
500 CAPSULE ORAL EVERY 8 HOURS SCHEDULED
Status: DISCONTINUED | OUTPATIENT
Start: 2023-06-07 | End: 2023-06-07 | Stop reason: HOSPADM

## 2023-06-07 RX ORDER — LIDOCAINE 40 MG/G
CREAM TOPICAL
Status: DISCONTINUED | OUTPATIENT
Start: 2023-06-07 | End: 2023-06-07 | Stop reason: HOSPADM

## 2023-06-07 RX ORDER — AMOXICILLIN 500 MG/1
500 CAPSULE ORAL 3 TIMES DAILY
Qty: 21 CAPSULE | Refills: 0 | Status: SHIPPED | OUTPATIENT
Start: 2023-06-07 | End: 2023-06-14

## 2023-06-07 RX ADMIN — AMOXICILLIN 500 MG: 500 CAPSULE ORAL at 18:46

## 2023-06-07 ASSESSMENT — ACTIVITIES OF DAILY LIVING (ADL): ADLS_ACUITY_SCORE: 31

## 2023-06-07 NOTE — PROGRESS NOTES
Data: Patient presented to [facility]: 2023  5:48 PM.  Reason for maternal/fetal assessment is cramping, blood in urine and pain with urination.  Patient denies uterine contractions, leaking of vaginal fluid/rupture of membranes, vaginal bleeding, abdominal pain, pelvic pressure, nausea, vomiting, headache, visual disturbances, epigastric or RUQ pain, significant edema. Patient reports fetal movement is normal. Patient is a 29w0d .  Prenatal record reviewed. Pregnancy has been complicated by fetal hydrops with shunt placement, repeat c/s.    Vital signs wnl. Support person is not present.     Action: Verbal consent for EFM. Triage assessment completed.     Response: Patient verbalized agreement with plan. Will contact Dr. Morfin with update and further orders.

## 2023-06-15 ENCOUNTER — HOSPITAL ENCOUNTER (OUTPATIENT)
Dept: ULTRASOUND IMAGING | Facility: OTHER | Age: 28
Discharge: HOME | End: 2023-06-15
Attending: OBSTETRICS & GYNECOLOGY
Payer: COMMERCIAL

## 2023-06-15 ENCOUNTER — PRENATAL OFFICE VISIT (OUTPATIENT)
Dept: OBGYN | Facility: OTHER | Age: 28
End: 2023-06-15
Attending: OBSTETRICS & GYNECOLOGY
Payer: COMMERCIAL

## 2023-06-15 VITALS
SYSTOLIC BLOOD PRESSURE: 128 MMHG | HEART RATE: 80 BPM | OXYGEN SATURATION: 100 % | WEIGHT: 151.9 LBS | DIASTOLIC BLOOD PRESSURE: 82 MMHG | BODY MASS INDEX: 26.07 KG/M2

## 2023-06-15 DIAGNOSIS — O35.CXX0 PLEURAL EFFUSION, FETAL, AFFECTING CARE OF MOTHER, ANTEPARTUM: ICD-10-CM

## 2023-06-15 DIAGNOSIS — O09.93 HIGH-RISK PREGNANCY IN THIRD TRIMESTER: Primary | ICD-10-CM

## 2023-06-15 PROCEDURE — 76816 OB US FOLLOW-UP PER FETUS: CPT

## 2023-06-15 PROCEDURE — 59025 FETAL NON-STRESS TEST: CPT | Performed by: OBSTETRICS & GYNECOLOGY

## 2023-06-15 PROCEDURE — 99207 PR OB VISIT-NO CHARGE - GICH ONLY: CPT | Performed by: OBSTETRICS & GYNECOLOGY

## 2023-06-15 ASSESSMENT — PAIN SCALES - GENERAL: PAINLEVEL: NO PAIN (0)

## 2023-06-15 NOTE — NURSING NOTE
Patient presets to clinic for OB visit, patient is 30 week 1 day  She states no concerns  /82   Pulse 80   Wt 68.9 kg (151 lb 14.4 oz)   LMP 11/16/2022 (Approximate)   SpO2 100%   BMI 26.07 kg/m    Valarie De Jesus LPN on 6/15/2023 at 10:21 AM

## 2023-06-15 NOTE — PROGRESS NOTES
CC: Recheck OB visit at 30w1d    HPI: Paula Hagan presents for a routine OB visit now at 30w1d  Concerns:   Patient notices normal fetal movement, denies contractions, vaginal bleeding or leaking of fluid.    OB History    Para Term  AB Living   4 2 2 0 1 2   SAB IAB Ectopic Multiple Live Births   0 0 0 0 2      # Outcome Date GA Lbr Femi/2nd Weight Sex Delivery Anes PTL Lv   4 Current            3 Term 21 37w1d  3.189 kg (7 lb 0.5 oz) M CS-LTranv   LES      Complications: Preeclampsia/Hypertension      Name: THERESA ARGUETA      Apgar1: 8  Apgar5: 8   2 AB 19 18w1d   M          Birth Comments: Fetal Hydrops   1 Term 18   3.201 kg (7 lb 0.9 oz) M CS-LVertical Gen  LES      Complications: Fetal Intolerance, Failure to Progress in First Stage      Name: ERIC ARGUETA      Apgar1: 7  Apgar5: 8     Current Outpatient Medications   Medication     albuterol (PROAIR HFA/PROVENTIL HFA/VENTOLIN HFA) 108 (90 Base) MCG/ACT inhaler     aspirin (ASA) 81 MG EC tablet     Prenatal Vit-Fe Fumarate-FA (PRENATAL MULTIVITAMIN W/IRON) 27-0.8 MG tablet     No current facility-administered medications for this visit.     O: /82   Pulse 80   Wt 68.9 kg (151 lb 14.4 oz)   LMP 2022 (Approximate)   SpO2 100%   BMI 26.07 kg/m    Body mass index is 26.07 kg/m .  See OB flow sheet  EXAM:  NAD  US today shows normal AFV  NST cat 1      NST doucmentation:    Indication: (O09.93) High-risk pregnancy in third trimester  (primary encounter diagnosis)  Comment:   Plan: FETAL NON-STRESS TEST            Duration: 30 min     30w1d  FHR baseline: 140 with moderate variability  Accelerations: y  Decelerations: n  Contractions: n    Category 1    Recent Results (from the past 24 hour(s))   US OB >14 Weeks Follow Up    Narrative    OB ULTRASOUND - Transabdominal     Clinical:   Pleural effusion, fetal, affecting care of mother,  antepartum.   Gestation:  1  Comparison: 2023, 2023  Presentation:  Cephalic  Cardiac Activity:  Regular at 155 bpm  Movement:  Yes  Placenta: Anterior Grade:  1'  Amniotic Fluid: Normal MARCO: 19.9 cm    Structural Survey:    The bilateral pleural effusions are again demonstrated, measuring 6 mm  on the right and 5 mm on the left when measured in similar fashion to  the 6/1/2023 comparison. These may be minimally increased in size,  measuring 5 mm on the right and 3 mm on the left on the prior exam.    Probable trace ascites adjacent to the right kidney.    Small bilateral hydroceles, measuring 1-2 mm.    Shunt material is again visualized in the low uterus.    Stomach:  Unremarkable  Kidneys:  Unremarkable  Bladder:  Unremarkable  4CH, LVOT, RVOT:  Unremarkable      Impression    IMPRESSION:     Single living intrauterine pregnancy.     Similar to minimally increased size of the bilateral pleural effusions  measuring 6 mm on the right and 5 mm on the left, previously 5 mm on  the right and 3 mm on the left.    Small bilateral hydroceles. Probable trace ascites adjacent to the  right kidney.    JOCELYN CARTER MD         SYSTEM ID:  [id number]         A/P: (O09.93) High-risk pregnancy in third trimester  (primary encounter diagnosis)  Comment:   Plan: FETAL NON-STRESS TEST          Recheck in 1 week  BPP      Problem List:   Pregnancy risk factors include:  Hx of preeclampsia  Hx of gestational DI- monitor for polydipsia, polyuria  Hx of hydrops with fetal demise in mid-trimester  S/p fetal pleuro-amnionic shunts. Plan [facility] delivery    Seth Morfin MD FACOG  10:28 AM 6/15/2023

## 2023-06-22 ENCOUNTER — HOSPITAL ENCOUNTER (OUTPATIENT)
Dept: ULTRASOUND IMAGING | Facility: OTHER | Age: 28
Discharge: HOME | End: 2023-06-22
Attending: OBSTETRICS & GYNECOLOGY
Payer: COMMERCIAL

## 2023-06-22 ENCOUNTER — ALLIED HEALTH/NURSE VISIT (OUTPATIENT)
Dept: OBGYN | Facility: OTHER | Age: 28
End: 2023-06-22
Attending: OBSTETRICS & GYNECOLOGY
Payer: COMMERCIAL

## 2023-06-22 DIAGNOSIS — O35.CXX0 PLEURAL EFFUSION, FETAL, AFFECTING CARE OF MOTHER, ANTEPARTUM: ICD-10-CM

## 2023-06-22 DIAGNOSIS — O09.93 HIGH-RISK PREGNANCY IN THIRD TRIMESTER: Primary | ICD-10-CM

## 2023-06-22 PROCEDURE — 76819 FETAL BIOPHYS PROFIL W/O NST: CPT

## 2023-06-22 ASSESSMENT — PAIN SCALES - GENERAL: PAINLEVEL: NO PAIN (0)

## 2023-06-22 NOTE — PROGRESS NOTES
Patient presents for routine NST, reactive and reassuring. Denies leaking of fluid or bleeding. Reports some contractions, intermittent, non painful.  Patient sees MFM next week.  Ana Dillon RN on 6/22/2023 at 9:47 AM

## 2023-06-23 NOTE — PROGRESS NOTES
June 20, 2023    Reviewed with General Genetics given remaining concern for potential genetic etiology. Discussed possible whole genome sequencing. Plan for inpatient genetics consult at time of delivery.     Francine Gavin MS, [facility]  Licensed Genetic Counselor  [facility]  Maternal Fetal Medicine  [email or web address]  [phone]

## 2023-06-27 NOTE — PATIENT INSTRUCTIONS
Birth Control Methods  Birth control methods are used to help prevent pregnancy. There are many different methods to choose from. Talk with your healthcare provider about which method is right for you. Be sure to ask your provider how well each one works. Also ask about the benefits, risks, and side effects of each method.   Hormones  Some birth control methods work by releasing hormones such as progestin and estrogen. These methods include hormone implants, hormone shots, the vaginal ring, the patch, and birth control pills. They all work by stopping the release of the egg from the ovary (ovulation). All of these methods work well and can be stopped at any time.     The implant is a small device that needs to be placed in the upper arm by a trained healthcare provider. It works for up to 3 years.    Hormone injections must be repeated every 3 months.    The vaginal ring must be replaced monthly. It can be removed during the fourth week of each cycle.    The patch must be replaced weekly. It's not worn during the fourth week of each cycle.    Birth control pills must be taken every day.  Intrauterine device (IUD)  An IUD is a small, T-shaped device. It must be placed in the uterus by a trained healthcare provider. There are different types of IUDs available. They work by causing changes in the uterus that make it harder for sperm to reach the egg. Depending on the type of IUD you have, it may work for several years or longer. The IUD is a reversible birth control method. This means it can be removed at any time.   Condom  A condom is a sheath that forms a thin barrier between the penis and the vagina. It helps prevent pregnancy by keeping sperm from entering the vagina. When latex condoms are used, they have the added benefit of protecting against most STIs (sexually transmitted infections). Condoms are used each time there is sexual intercourse and should be discarded after each use. Ask your healthcare  provider about the different types of condoms available. These include both the male condom and female condom.   Spermicide  Spermicides come as foams, jellies, creams, suppositories, and tablets.  They help prevent pregnancy by killing sperm. When used alone they are not that reliable. They work best when combined with other birth control methods such as diaphragms and cervical caps.   Sponge, diaphragm, and cervical cap   All of these methods help prevent pregnancy by covering the opening of the uterus (cervix). This prevents sperm from passing through.   The sponge contains spermicide. It can be bought over the counter. The sponge must be left in place for at least 6 hours after the last time you have sex. However, it should not stay in place for more than 24 hours. Discard after use.   The diaphragm and cervical cap must be fitted and prescribed by your healthcare provider. Both are used with spermicide. The diaphragm must be left in place for at least 6 hours after sex. However, it should not stay in place for more than 24 hours. It can be washed and reused. The cervical cap must be left in place for at least 6 hours after sex. However, it should not stay in place for more than 48 hours. It can be washed and reused.   Withdrawal method  This is when the man pulls his penis out of the vagina just before ejaculation ( coming ). This lowers the amount of sperm entering the vagina. Be aware that fluids released just before ejaculation often still contain some sperm, so this method is not as reliable as certain other methods.   Rhythm method   This method is also call natural family planning or fertility awareness. It requires that you know when in your menstrual cycle you are likely to become pregnant. Then you not have sex during those days. This requires careful planning and good discipline. Your healthcare provider can explain more about how this works.   Tubal ligation and vasectomy  These are surgical methods  to prevent pregnancy. Tubal ligation is an option for women. The fallopian tubes are blocked or cut (ligated). This keeps the egg from passing into the uterus or sperm from reaching the egg. Vasectomy is an option for men. The tubes that normally carry sperm to the penis are either closed or blocked. Both tubal ligation and vasectomy are permanent birth control methods. This means reversal is either not possible or unlikely to work. They are good choices for women and men who know that they don't want to have children in the future.   [employer] last reviewed this educational content on 2020-2022 [employer]. All rights reserved. This information is not intended as a substitute for professional medical care. Always follow your healthcare professional's instructions.          Understanding  Labor  Going into labor before week 37 of pregnancy is called  labor.  labor can cause your baby to be born too soon. This can lead to health problems for your baby.     Before labor, the cervix is thick and closed.      In  labor, the cervix begins to efface (thin) and dilate (open).     Symptoms of  labor  If you think you re having  labor, get medical help right away. Contractions alone don t mean you re in  labor. What matters more are changes in your cervix. The cervix is the opening at the lower end of the uterus. Symptoms of  labor include:    4 or more contractions per hour    Strong contractions    Constant menstrual-like cramping    Low-back pain    Mucous or bloody fluid from the vagina    Bleeding or spotting in the second or third trimester  Evaluating  labor  Your healthcare provider will try to find out if you re in  labor or just having contractions. They may watch you for a few hours. You may have these tests:    Pelvic exam. This is to see if your cervix has effaced (thinned) and dilated (opened).    Uterine activity  monitoring. This is used to detect contractions.    Fetal monitoring. This is done to check the health of your baby.    Ultrasound. This test looks at your baby s size and position.    Amniocentesis. This test checks how mature your baby s lungs are.  Caring for yourself at home  If you have  contractions, but your cervix is still thick and closed, your healthcare provider may tell you to:    Drink plenty of water.    Do fewer activities.    Rest in bed on your side.    Don't have intercourse or stimulate your nipples.  When to call your healthcare provider  Call your healthcare provider if you have any of these:    4 or more contractions per hour    Bag of water breaks    Bleeding or spotting  If you need hospital care   labor often means that you need hospital care. You may need complete bed rest. You may have an IV (intravenous) line in your arm or hand. This is to give you fluids. You may be given pills or injections. These are done to help prevent contractions. You may get a medicine called a corticosteroid. This is to help your baby s lungs mature more quickly.  Are you at risk?  Any pregnant woman can have  labor. It may start for no reason. But these risk factors can increase your chances:    Past  labor or early birth    Smoking, drug, or alcohol use in pregnancy    A multiple pregnancy (twins or more)    Problems with the shape of the uterus    Bleeding during the pregnancy  The dangers of  birth  A baby born too soon may have health problems. This is because the baby didn t have enough time to grow. Some of the risks for your baby include:    Not breastfeeding or feeding well    Having immature lungs    Bleeding in the brain    Death  Reaching term  Your goal is to get as close to term (week 37 or later) as you can before giving birth. The closer you get to term, the higher your chance of having a healthy baby. Work with your healthcare provider. Together, you can take  steps that may keep you from giving birth too early.  [employer] last reviewed this educational content on 10/1/2021    8487-4906 [employer]. All rights reserved. This information is not intended as a substitute for professional medical care. Always follow your healthcare professional's instructions.          Adapting to Pregnancy: Third Trimester  Although common during pregnancy, some discomforts may seem worse in the final weeks. Simple lifestyle changes can help. Take care of yourself. And ask your partner to help out with small tasks.   Limiting leg problems  Ways to combat leg issues:    Wear support hose all day.    Don't wear snug shoes or clothes that bind, such as tight pants and socks with elastic tops.    Sit with your feet and legs raised often.  Caring for your breasts  Tips to follow include:    Wash with plain water. Don't use harsh soaps or rubbing alcohol. They may cause dryness.    Wear a nursing bra for extra support. It can also hide any leaks from your nipples.  Controlling hemorrhoids  Ways to prevent hemorrhoids include:     Eat foods that are high in fiber. Also exercise and drink enough fluids. This will reduce constipation and hemorrhoids.    Sleep and nap on your side. This limits pressure on the veins of your rectum.    Try not to stand or sit for long periods.  Controlling back pain  As your body changes during pregnancy, your back must work in new ways. Back pain has many causes. Physical changes in your body can strain your back and its supporting muscles. Also hormones increase during pregnancy. This can affect how your muscles and joints work together. All of these changes can lead to pain.   Pain may be felt in the upper or lower back. Pain is also common in the pelvis. Some pregnant women have sciatica. This is pain caused by pressure on the sciatic nerve running down the back of the leg. Ice or heat may help. Your provider may advise massage therapy or a  chiropractor. Sleep on your left side with a pillow between your knees. Use a brace or support device. Ask your provider for specific tips and exercises to help control your back pain.   Tips to help you rest  Good rest and sleep will help you feel better. Here are some ideas:     Ask your partner to massage your shoulders, neck, or back.    Limit the errands you do each day.    Lie down in the afternoon or after work for a few minutes.    Take a warm bath before you go to sleep.    Drink warm milk or teas without caffeine.    Don't drink coffee, black tea, and cola.  Stopping heartburn    Don't eat spicy, greasy, fried, or acidic foods.    Eat small amounts more often. Eat slowly.   Wait 2 hours after eating before lying down.    Sleep with your upper body raised 6 inches.   Managing mood swings  Ways to manage mood swings include:     Know that mood changes are normal.    Exercise often, but get plenty of rest.    Address any concerns and limit stress. Talking to your partner, other women, or your healthcare provider may help.   Dealing with urinary frequency  Tips to deal with having to urinate often include:     Drink plenty of water all day. But if you drink a lot in the evening, you may have to get up more in the night.    Limit coffee, black tea, and cola.  How daily issues affect your health  Many things in your daily life impact your health. This can include transportation, money problems, housing, access to food, and . If you can t get to medical appointments, you may not receive the care you need. When money is tight, it may be difficult to pay for medicines. And living far from a grocery store can make it hard to buy healthy food.   If you have concerns in any of these or other areas, talk with your healthcare team. They may know of local resources to assist you. Or they may have a staff person who can help.   [employer] last reviewed this educational content on 7/1/2021 2000-2022 [employer]. All rights reserved. This information is not intended as a substitute for professional medical care. Always follow your healthcare professional's instructions.        You have been provided the Any Day Now: Early Labor at Home document.    Additional copies can be found here:  [email or web address]  You have been provided the What I'd Wish I'd Known About Giving Birth document.    Additional copies can be found here:  [email or web address]

## 2023-06-29 ENCOUNTER — HOSPITAL ENCOUNTER (OUTPATIENT)
Dept: ULTRASOUND IMAGING | Facility: CLINIC | Age: 28
Discharge: HOME | End: 2023-06-29
Attending: STUDENT IN AN ORGANIZED HEALTH CARE EDUCATION/TRAINING PROGRAM
Payer: COMMERCIAL

## 2023-06-29 ENCOUNTER — OFFICE VISIT (OUTPATIENT)
Dept: MATERNAL FETAL MEDICINE | Facility: CLINIC | Age: 28
End: 2023-06-29
Attending: STUDENT IN AN ORGANIZED HEALTH CARE EDUCATION/TRAINING PROGRAM
Payer: COMMERCIAL

## 2023-06-29 VITALS
RESPIRATION RATE: 18 BRPM | SYSTOLIC BLOOD PRESSURE: 115 MMHG | WEIGHT: 154.2 LBS | BODY MASS INDEX: 26.47 KG/M2 | OXYGEN SATURATION: 100 % | HEART RATE: 77 BPM | DIASTOLIC BLOOD PRESSURE: 81 MMHG

## 2023-06-29 DIAGNOSIS — O36.23X0 HYDROPS FETALIS IN THIRD TRIMESTER, SINGLE OR UNSPECIFIED FETUS: ICD-10-CM

## 2023-06-29 DIAGNOSIS — O35.CXX0 PLEURAL EFFUSION, FETAL, AFFECTING CARE OF MOTHER, ANTEPARTUM: ICD-10-CM

## 2023-06-29 DIAGNOSIS — O09.293 HX OF PREECLAMPSIA, PRIOR PREGNANCY, CURRENTLY PREGNANT, THIRD TRIMESTER: ICD-10-CM

## 2023-06-29 DIAGNOSIS — O34.219 HISTORY OF CESAREAN SECTION COMPLICATING PREGNANCY: Primary | ICD-10-CM

## 2023-06-29 DIAGNOSIS — O09.293 PRIOR PREGNANCY WITH FETAL DEMISE AND CURRENT PREGNANCY IN THIRD TRIMESTER: ICD-10-CM

## 2023-06-29 PROCEDURE — 76816 OB US FOLLOW-UP PER FETUS: CPT | Mod: 26 | Performed by: OBSTETRICS & GYNECOLOGY

## 2023-06-29 PROCEDURE — 76816 OB US FOLLOW-UP PER FETUS: CPT

## 2023-06-29 PROCEDURE — 76819 FETAL BIOPHYS PROFIL W/O NST: CPT | Mod: 26 | Performed by: OBSTETRICS & GYNECOLOGY

## 2023-06-29 PROCEDURE — 99215 OFFICE O/P EST HI 40 MIN: CPT | Mod: 25 | Performed by: OBSTETRICS & GYNECOLOGY

## 2023-06-29 PROCEDURE — 90715 TDAP VACCINE 7 YRS/> IM: CPT

## 2023-06-29 PROCEDURE — 90471 IMMUNIZATION ADMIN: CPT

## 2023-06-29 PROCEDURE — 250N000011 HC RX IP 250 OP 636

## 2023-06-29 RX ORDER — CALCIUM CARBONATE 500 MG/1
1 TABLET, CHEWABLE ORAL 2 TIMES DAILY
COMMUNITY
End: 2023-08-21

## 2023-06-29 ASSESSMENT — PATIENT HEALTH QUESTIONNAIRE - PHQ9: SUM OF ALL RESPONSES TO PHQ QUESTIONS 1-9: 3

## 2023-06-29 NOTE — PATIENT INSTRUCTIONS
Understanding  Labor  Going into labor before week 37 of pregnancy is called  labor.  labor can cause your baby to be born too soon. This can lead to health problems for your baby.     Before labor, the cervix is thick and closed.      In  labor, the cervix begins to efface (thin) and dilate (open).     Symptoms of  labor  If you think you re having  labor, get medical help right away. Contractions alone don t mean you re in  labor. What matters more are changes in your cervix. The cervix is the opening at the lower end of the uterus. Symptoms of  labor include:    4 or more contractions per hour    Strong contractions    Constant menstrual-like cramping    Low-back pain    Mucous or bloody fluid from the vagina    Bleeding or spotting in the second or third trimester  Evaluating  labor  Your healthcare provider will try to find out if you re in  labor or just having contractions. They may watch you for a few hours. You may have these tests:    Pelvic exam. This is to see if your cervix has effaced (thinned) and dilated (opened).    Uterine activity monitoring. This is used to detect contractions.    Fetal monitoring. This is done to check the health of your baby.    Ultrasound. This test looks at your baby s size and position.    Amniocentesis. This test checks how mature your baby s lungs are.  Caring for yourself at home  If you have  contractions, but your cervix is still thick and closed, your healthcare provider may tell you to:    Drink plenty of water.    Do fewer activities.    Rest in bed on your side.    Don't have intercourse or stimulate your nipples.  When to call your healthcare provider  Call your healthcare provider if you have any of these:    4 or more contractions per hour    Bag of water breaks    Bleeding or spotting  If you need hospital care   labor often means that you need hospital care. You may need  complete bed rest. You may have an IV (intravenous) line in your arm or hand. This is to give you fluids. You may be given pills or injections. These are done to help prevent contractions. You may get a medicine called a corticosteroid. This is to help your baby s lungs mature more quickly.  Are you at risk?  Any pregnant woman can have  labor. It may start for no reason. But these risk factors can increase your chances:    Past  labor or early birth    Smoking, drug, or alcohol use in pregnancy    A multiple pregnancy (twins or more)    Problems with the shape of the uterus    Bleeding during the pregnancy  The dangers of  birth  A baby born too soon may have health problems. This is because the baby didn t have enough time to grow. Some of the risks for your baby include:    Not breastfeeding or feeding well    Having immature lungs    Bleeding in the brain    Death  Reaching term  Your goal is to get as close to term (week 37 or later) as you can before giving birth. The closer you get to term, the higher your chance of having a healthy baby. Work with your healthcare provider. Together, you can take steps that may keep you from giving birth too early.  [employer] last reviewed this educational content on 10/1/2021    [id number] [employer]. All rights reserved. This information is not intended as a substitute for professional medical care. Always follow your healthcare professional's instructions.          Adapting to Pregnancy: Third Trimester  Although common during pregnancy, some discomforts may seem worse in the final weeks. Simple lifestyle changes can help. Take care of yourself. And ask your partner to help out with small tasks.   Limiting leg problems  Ways to combat leg issues:    Wear support hose all day.    Don't wear snug shoes or clothes that bind, such as tight pants and socks with elastic tops.    Sit with your feet and legs raised often.  Caring for your  breasts  Tips to follow include:    Wash with plain water. Don't use harsh soaps or rubbing alcohol. They may cause dryness.    Wear a nursing bra for extra support. It can also hide any leaks from your nipples.  Controlling hemorrhoids  Ways to prevent hemorrhoids include:     Eat foods that are high in fiber. Also exercise and drink enough fluids. This will reduce constipation and hemorrhoids.    Sleep and nap on your side. This limits pressure on the veins of your rectum.    Try not to stand or sit for long periods.  Controlling back pain  As your body changes during pregnancy, your back must work in new ways. Back pain has many causes. Physical changes in your body can strain your back and its supporting muscles. Also hormones increase during pregnancy. This can affect how your muscles and joints work together. All of these changes can lead to pain.   Pain may be felt in the upper or lower back. Pain is also common in the pelvis. Some pregnant women have sciatica. This is pain caused by pressure on the sciatic nerve running down the back of the leg. Ice or heat may help. Your provider may advise massage therapy or a chiropractor. Sleep on your left side with a pillow between your knees. Use a brace or support device. Ask your provider for specific tips and exercises to help control your back pain.   Tips to help you rest  Good rest and sleep will help you feel better. Here are some ideas:     Ask your partner to massage your shoulders, neck, or back.    Limit the errands you do each day.    Lie down in the afternoon or after work for a few minutes.    Take a warm bath before you go to sleep.    Drink warm milk or teas without caffeine.    Don't drink coffee, black tea, and cola.  Stopping heartburn    Don't eat spicy, greasy, fried, or acidic foods.    Eat small amounts more often. Eat slowly.   Wait 2 hours after eating before lying down.    Sleep with your upper body raised 6 inches.   Managing mood  swings  Ways to manage mood swings include:     Know that mood changes are normal.    Exercise often, but get plenty of rest.    Address any concerns and limit stress. Talking to your partner, other women, or your healthcare provider may help.   Dealing with urinary frequency  Tips to deal with having to urinate often include:     Drink plenty of water all day. But if you drink a lot in the evening, you may have to get up more in the night.    Limit coffee, black tea, and cola.  How daily issues affect your health  Many things in your daily life impact your health. This can include transportation, money problems, housing, access to food, and . If you can t get to medical appointments, you may not receive the care you need. When money is tight, it may be difficult to pay for medicines. And living far from a grocery store can make it hard to buy healthy food.   If you have concerns in any of these or other areas, talk with your healthcare team. They may know of local resources to assist you. Or they may have a staff person who can help.   [employer] last reviewed this educational content on 7/1/2021 2000-2022 [employer]. All rights reserved. This information is not intended as a substitute for professional medical care. Always follow your healthcare professional's instructions.

## 2023-06-29 NOTE — PROGRESS NOTES
Maternal-Fetal Medicine OB Follow-up     Paula Hagan  : 1995  MRN: [id number]    REFERRAL:  Paula Hagan is a 28 year old shared care visit from [facility] by Dr. Morfin.    HPI:  Paula Hagan is a 28 year old  at 32w1d by LMP consistent with 10w2d US here for shared care visit.    She is here alone.      Paula has been overall doing well. She is mildly concerned about her BP, which she shares has seemed elevated recently, at its highest it was 134 SBP. DBP is never greater than 89. She checks her BP a couple times weekly, most home BPs are in the 120s/80s-90. She would like to leave today with a delivery plan and surveillance plan for up until delivery. She denies any concerns including vaginal bleeding, LOF, painful contractions.  Denies fever, chills, headache, chest pain, shortness of breath, leg swelling.  Active fetal movement.  No additional concerns.    Obstetrics History:  OB History    Para Term  AB Living   4 2 2 0 1 2   SAB IAB Ectopic Multiple Live Births   0 0 0 0 2      # Outcome Date GA Lbr Femi/2nd Weight Sex Delivery Anes PTL Lv   4 Current            3 Term 21 37w1d  3.189 kg (7 lb 0.5 oz) M CS-LTranv   LES      Complications: Preeclampsia/Hypertension      Name: THERESA ARGUETA      Apgar1: 8  Apgar5: 8   2 AB 19 18w1d   M          Birth Comments: Fetal Hydrops   1 Term 18   3.201 kg (7 lb 0.9 oz) M CS-LVertical Gen  LES      Complications: Fetal Intolerance, Failure to Progress in First Stage      Name: ELLIEERIC      Apgar1: 7  Apgar5: 8     PHYSICAL EXAM:  /81 (BP Location: Left arm, Patient Position: Sitting, Cuff Size: Adult Regular)   Pulse 77   Resp 18   Wt 69.9 kg (154 lb 3.2 oz)   LMP 2022 (Approximate)   SpO2 100%   BMI 26.47 kg/m    Gen: alert, oriented, appears well  Heart:  Regular rate   Lungs:  Non-labored  Abdomen:  Gravid, non-tender  Extremities: No edema    Current weight: 154 lbs 3.2 oz Prepregnancy weight:  "144 lbs; TWG: 10lbs    Prenatal Labs:    23  AST 18, ALT 11, Creatinine 0.63  TSH 2.38  Hgb 12.8,   Treponema NR       23  HIV neg  Hepatitis C neg  Hepatitis B neg  A positive, antibody negative  NIPT low risk XY  Rubella immune  RPR neg     23  Urine culture mixed urogenital kurt  GC/CT negative     Amniocentesis for chromosomal microarray, whole exome sequencing, and for CMV/Parvovirus PCR negative.    Other Imaging:   See today's US under Chart Review, \"Imaging\" tab,\" US dated 23    ASSESSMENT/PLAN:  Paula Hagan is a 28 year old  at 32w1d by LMP consistent with 10w2d US here for shared care visit:    Pregnancy complicated by:  1.  Fetal bilateral pleural effusions s/p bilateral thoracentesis 23 and thoracoamniotic shunt bilaterally on 23.  Has had amniocentesis and evaluation of thoracic fluid for chylothorax (negative).  2.  Prior  section x 2  3.  History of cystic hygroma with 18 week fetal demise in prior pregnancy  4.  History of gestational hypertension in 2 prior pregnancies (never received magnesium, urine protein creatinine ratio <0.3, mild range BPs)  5. History of gestational diabetes insipidus in first pregnancy not in second pregnancy. She denies history of gestational diabetes.  Has not had any concerning symptoms this pregnancy.      Fetal bilateral pleural effusions s/p bilateral thoracentesis and shunt, now with increasing left pleural effusion  - See Plan outlined in today's US dated 23, in Chart Review under \"Imaging\" tab  - Weekly assessment of the pleural effusions, hydrops assessment and BPP (sent in-box message to Dr. Morfin to determine if can be done locally or through telemedicine; next ultrasound scheduled in their office on 2023)  - s/p BMTZ -  - Amniocentesis for chromosomal microarray, whole exome sequencing, and for CMV/Parvovirus PCR negative.  - Considering possible whole genome sequencing, considering " inpatient genetics consult at time of delivery     History of gestational hypertension  - has baseline pre-eclampsia labs  - normotensive  - on ASA 81 mg  - signs/symptoms of pre-eclampsia reviewed     History of gestational diabetes inspidius  - denies history of gestational diabetes  - denies any concerns of polyuria or polydipsia  - has been off of Desmopressin since 2018 and states this was only associated with that pregnancy  - 1 hr , per patient this is below Dr. Morfin's threshold for 3 hr GTT     Prenatal care  - continued care locally with Dr. Morfin, next visit in 1 week  - return OB visit with MFM at 36w6d for repeat assessment of fetal growth, hydrops and BPP, as well as plan for preoperative labs  - RCS scheduled today at [facility] for 37w0d, 8/2/23 at 0830 with Dr. Reed  - Rh positive  - TDAP given today  - hgb 5/11/23 = 12.8  -   - RPR 5/11/23 = NR    The patient was seen and evaluated with Dr. Vines    Thank you for allowing us to participate in the care of your patient. Please do not hesitate to contact us if you have further questions regarding the management of your patient.     Molly Tamez MD  Obstetrics & Gynecology, PGY-2  06/29/2023 10:19 AM    Physician Attestation   I saw this patient with the fellow and agree with the resident/fellow's findings and plan of care as documented in the note.      40 MINUTES SPENT BY ME on the date of service doing chart review, history, exam, documentation & further activities per the note.    Mariposa Vines MD  Date of Service (when I saw the patient): 06/29/23

## 2023-06-29 NOTE — NURSING NOTE
Paula seen in clinic today for OB visit at 32w1d gestation. VSS. Pt reports normal fetal movement, see flowsheet. Pt evaluated for  labor, preeclampsia, infection, fetal wellbeing without concerns. Pt denies bleeding/lof/change in vaginal discharge/contractions/headache/vision changes/chest pain/SOB/edema. Evaluated mood, patient said she is feeling well overall and managing her anxiety on her own. Is experiencing some heartburn but it is managed with Tums. Patient is hoping to make delivery plan today. Tdap and PHQ-9 done. Pt notified to review new pt education in AVS, verbally reviewed highlights. Dr. Tamez and Dr. Vines met with pt and discussed POC. Plan for return on  for RL2 with BPP and OBV to prepare for scheduled RCS on  at 0830. Future visits scheduled at . Pt discharged stable and ambulatory.

## 2023-07-03 DIAGNOSIS — O35.CXX0 PLEURAL EFFUSION, FETAL, AFFECTING CARE OF MOTHER, ANTEPARTUM: Primary | ICD-10-CM

## 2023-07-03 DIAGNOSIS — O09.93 HIGH-RISK PREGNANCY IN THIRD TRIMESTER: ICD-10-CM

## 2023-07-06 ENCOUNTER — PRENATAL OFFICE VISIT (OUTPATIENT)
Dept: OBGYN | Facility: OTHER | Age: 28
End: 2023-07-06
Attending: OBSTETRICS & GYNECOLOGY
Payer: COMMERCIAL

## 2023-07-06 ENCOUNTER — HOSPITAL ENCOUNTER (OUTPATIENT)
Dept: ULTRASOUND IMAGING | Facility: OTHER | Age: 28
Discharge: HOME | End: 2023-07-06
Attending: OBSTETRICS & GYNECOLOGY
Payer: COMMERCIAL

## 2023-07-06 VITALS
BODY MASS INDEX: 26.61 KG/M2 | SYSTOLIC BLOOD PRESSURE: 138 MMHG | HEART RATE: 99 BPM | WEIGHT: 155 LBS | DIASTOLIC BLOOD PRESSURE: 82 MMHG

## 2023-07-06 DIAGNOSIS — O35.CXX0 PLEURAL EFFUSION, FETAL, AFFECTING CARE OF MOTHER, ANTEPARTUM: ICD-10-CM

## 2023-07-06 DIAGNOSIS — O35.CXX0 PLEURAL EFFUSION, FETAL, AFFECTING CARE OF MOTHER, ANTEPARTUM: Primary | ICD-10-CM

## 2023-07-06 LAB
ALBUMIN MFR UR ELPH: <4 MG/DL
CREAT UR-MCNC: 23.9 MG/DL
PROT/CREAT 24H UR: NORMAL MG/G{CREAT}

## 2023-07-06 PROCEDURE — 59025 FETAL NON-STRESS TEST: CPT | Performed by: OBSTETRICS & GYNECOLOGY

## 2023-07-06 PROCEDURE — 99207 PR OB VISIT-NO CHARGE - GICH ONLY: CPT | Performed by: OBSTETRICS & GYNECOLOGY

## 2023-07-06 PROCEDURE — 84156 ASSAY OF PROTEIN URINE: CPT | Mod: ZL | Performed by: OBSTETRICS & GYNECOLOGY

## 2023-07-06 PROCEDURE — 76819 FETAL BIOPHYS PROFIL W/O NST: CPT

## 2023-07-06 ASSESSMENT — PAIN SCALES - GENERAL: PAINLEVEL: NO PAIN (0)

## 2023-07-06 NOTE — PROGRESS NOTES
Problem List:   Pregnancy risk factors include:  Hx of preeclampsia  Hx of gestational DI- monitor for polydipsia, polyuria  Hx of hydrops with fetal demise in mid-trimester  S/p fetal pleuro-amnionic shunts. Plan [place] delivery    CC: Recheck OB visit at 33w1d    HPI: Paula Hagan presents for a routine OB visit now at 33w1d  Concerns: some swelling and high normal BP today.  Patient notices normal fetal movement, denies contractions, vaginal bleeding or leaking of fluid.    OB History    Para Term  AB Living   4 2 2 0 1 2   SAB IAB Ectopic Multiple Live Births   0 0 0 0 2      # Outcome Date GA Lbr Femi/2nd Weight Sex Delivery Anes PTL Lv   4 Current            3 Term 21 37w1d  3.189 kg (7 lb 0.5 oz) M CS-LTranv   LES      Complications: Preeclampsia/Hypertension      Name: THERESA ARGUETA      Apgar1: 8  Apgar5: 8   2 AB 19 18w1d   M          Birth Comments: Fetal Hydrops   1 Term 18   3.201 kg (7 lb 0.9 oz) M CS-LVertical Gen  LES      Complications: Fetal Intolerance, Failure to Progress in First Stage      Name: ERIC ARGUETA      Apgar1: 7  Apgar5: 8     Current Outpatient Medications   Medication     albuterol (PROAIR HFA/PROVENTIL HFA/VENTOLIN HFA) 108 (90 Base) MCG/ACT inhaler     aspirin (ASA) 81 MG EC tablet     calcium carbonate (TUMS) 500 MG chewable tablet     Prenatal Vit-Fe Fumarate-FA (PRENATAL MULTIVITAMIN W/IRON) 27-0.8 MG tablet     No current facility-administered medications for this visit.         O: /82   Pulse 99   Wt 70.3 kg (155 lb)   LMP 2022 (Approximate)   BMI 26.61 kg/m    Body mass index is 26.61 kg/m .  See OB flow sheet  EXAM:  NAD  BPP done and pending the read. No significant change noted on my viewing.  Normal AFV    No results found for any visits on 23.    A/P: (O35.CXX0) Pleural effusion, fetal, affecting care of mother, antepartum  (primary encounter diagnosis)  Comment:   Plan: FETAL NON-STRESS TEST          Recheck  in 1 week(s)    Seth Morfin MD FACOG  9:58 AM 7/6/2023

## 2023-07-06 NOTE — NURSING NOTE
"No chief complaint on file.  Pt presents to clinic today for prenatal care 33w 1d. Pt denies any bleeding, or leakage of fluid at this time. States baby is moving good. Patient denies contractions.     Initial /82   Pulse 99   Wt 70.3 kg (155 lb)   LMP 11/16/2022 (Approximate)   BMI 26.61 kg/m   Estimated body mass index is 26.61 kg/m  as calculated from the following:    Height as of 12/15/22: 1.626 m (5' 4\").    Weight as of this encounter: 70.3 kg (155 lb).  Medication Reconciliation: complete        Sangeeta Nova LPN    "

## 2023-07-12 ENCOUNTER — PRENATAL OFFICE VISIT (OUTPATIENT)
Dept: OBGYN | Facility: OTHER | Age: 28
End: 2023-07-12
Payer: COMMERCIAL

## 2023-07-12 ENCOUNTER — HOSPITAL ENCOUNTER (OUTPATIENT)
Dept: ULTRASOUND IMAGING | Facility: OTHER | Age: 28
Discharge: HOME | End: 2023-07-12
Attending: OBSTETRICS & GYNECOLOGY
Payer: COMMERCIAL

## 2023-07-12 VITALS
WEIGHT: 158.4 LBS | DIASTOLIC BLOOD PRESSURE: 74 MMHG | HEART RATE: 102 BPM | BODY MASS INDEX: 27.19 KG/M2 | SYSTOLIC BLOOD PRESSURE: 112 MMHG

## 2023-07-12 DIAGNOSIS — N89.8 VAGINAL DISCHARGE: Primary | ICD-10-CM

## 2023-07-12 DIAGNOSIS — O35.CXX0 PLEURAL EFFUSION, FETAL, AFFECTING CARE OF MOTHER, ANTEPARTUM: ICD-10-CM

## 2023-07-12 DIAGNOSIS — O09.93 HIGH-RISK PREGNANCY IN THIRD TRIMESTER: ICD-10-CM

## 2023-07-12 LAB
BACTERIAL VAGINOSIS VAG-IMP: NEGATIVE
CANDIDA DNA VAG QL NAA+PROBE: NOT DETECTED
CANDIDA GLABRATA / CANDIDA KRUSEI DNA: NOT DETECTED
CRYSTALS AMN MICRO: NORMAL
RUPTURE OF FETAL MEMBRANES BY ROM PLUS: NEGATIVE
T VAGINALIS DNA VAG QL NAA+PROBE: NOT DETECTED

## 2023-07-12 PROCEDURE — 84112 EVAL AMNIOTIC FLUID PROTEIN: CPT | Mod: ZL

## 2023-07-12 PROCEDURE — 99207 PR OB VISIT-NO CHARGE - GICH ONLY: CPT

## 2023-07-12 PROCEDURE — 87801 DETECT AGNT MULT DNA AMPLI: CPT | Mod: ZL

## 2023-07-12 PROCEDURE — 59025 FETAL NON-STRESS TEST: CPT

## 2023-07-12 PROCEDURE — 76816 OB US FOLLOW-UP PER FETUS: CPT

## 2023-07-12 ASSESSMENT — PAIN SCALES - GENERAL: PAINLEVEL: EXTREME PAIN (8)

## 2023-07-12 NOTE — NURSING NOTE
Patient presents to clinic for OB visit, patient is 34 week 0 day  No concerns  /74   Pulse 102   Wt 71.8 kg (158 lb 6.4 oz)   LMP 11/16/2022 (Approximate)   BMI 27.19 kg/m    Valarie De Jesus LPN on 7/12/2023 at 11:29 AM

## 2023-07-12 NOTE — PROGRESS NOTES
OB Visit    S: Patient is feeling well today just tired. Denies LOF, VB, or regular Ctx. +FM.    Concerns: discharge leaking- unsure if amniotic fluid.     O: /74   Pulse 102   Wt 71.8 kg (158 lb 6.4 oz)   LMP 2022 (Approximate)   BMI 27.19 kg/m    Gen: Well-appearing, NAD  BPP 8/8  FHT: 140 BL, + accelerations, toco quiet, mod variability, one small variable.       A/P:  Paula aHgan is a 28 year old  at 34w0d by 10 weeks 2 days   C/W LMP, here for return OB visit.  Fetal Pleural effusion- continue BPPs as well as NSTs. Effusion still present today.   Increased discharge- MVP and ROM+ collected.     PNC: - Prenatal labs WNL:, Rh +, Rubella immune, , 3rd Trimester HGB 12.8, GBS TBD   Rhogam: NA  Genetics: invitae low risk   Imaging: Pleural effusion otherwise WNL anatomy   Immunizations: TDAP 23    RTC 1 week with BPP NST      Emily HICKMAN, FNP-C  2023 12:38 PM

## 2023-07-20 ENCOUNTER — PRENATAL OFFICE VISIT (OUTPATIENT)
Dept: OBGYN | Facility: OTHER | Age: 28
End: 2023-07-20
Payer: COMMERCIAL

## 2023-07-20 ENCOUNTER — HOSPITAL ENCOUNTER (OUTPATIENT)
Dept: ULTRASOUND IMAGING | Facility: OTHER | Age: 28
Discharge: HOME | End: 2023-07-20
Attending: OBSTETRICS & GYNECOLOGY
Payer: COMMERCIAL

## 2023-07-20 VITALS
WEIGHT: 159.1 LBS | BODY MASS INDEX: 27.31 KG/M2 | HEART RATE: 78 BPM | DIASTOLIC BLOOD PRESSURE: 72 MMHG | SYSTOLIC BLOOD PRESSURE: 120 MMHG

## 2023-07-20 DIAGNOSIS — O35.CXX0 PLEURAL EFFUSION, FETAL, AFFECTING CARE OF MOTHER, ANTEPARTUM: Primary | ICD-10-CM

## 2023-07-20 DIAGNOSIS — O26.893 HEADACHE IN PREGNANCY, ANTEPARTUM, THIRD TRIMESTER: ICD-10-CM

## 2023-07-20 DIAGNOSIS — O35.CXX0 PLEURAL EFFUSION, FETAL, AFFECTING CARE OF MOTHER, ANTEPARTUM: ICD-10-CM

## 2023-07-20 DIAGNOSIS — R51.9 HEADACHE IN PREGNANCY, ANTEPARTUM, THIRD TRIMESTER: ICD-10-CM

## 2023-07-20 DIAGNOSIS — O09.93 HIGH-RISK PREGNANCY IN THIRD TRIMESTER: ICD-10-CM

## 2023-07-20 LAB
ALBUMIN MFR UR ELPH: <4 MG/DL
ALBUMIN SERPL BCG-MCNC: 3.6 G/DL (ref 3.5–5.2)
ALP SERPL-CCNC: 89 U/L (ref 35–104)
ALT SERPL W P-5'-P-CCNC: 11 U/L (ref 0–50)
ANION GAP SERPL CALCULATED.3IONS-SCNC: 10 MMOL/L (ref 7–15)
AST SERPL W P-5'-P-CCNC: 18 U/L (ref 0–45)
BILIRUB SERPL-MCNC: 0.8 MG/DL
BUN SERPL-MCNC: 6.6 MG/DL (ref 6–20)
CALCIUM SERPL-MCNC: 9 MG/DL (ref 8.6–10)
CHLORIDE SERPL-SCNC: 108 MMOL/L (ref 98–107)
CREAT SERPL-MCNC: 0.66 MG/DL (ref 0.51–0.95)
CREAT UR-MCNC: 27.9 MG/DL
DEPRECATED HCO3 PLAS-SCNC: 23 MMOL/L (ref 22–29)
ERYTHROCYTE [DISTWIDTH] IN BLOOD BY AUTOMATED COUNT: 13.2 % (ref 10–15)
GFR SERPL CREATININE-BSD FRML MDRD: >90 ML/MIN/1.73M2
GLUCOSE SERPL-MCNC: 83 MG/DL (ref 70–99)
HCT VFR BLD AUTO: 35.3 % (ref 35–47)
HGB BLD-MCNC: 11.5 G/DL (ref 11.7–15.7)
MCH RBC QN AUTO: 29.4 PG (ref 26.5–33)
MCHC RBC AUTO-ENTMCNC: 32.6 G/DL (ref 31.5–36.5)
MCV RBC AUTO: 90 FL (ref 78–100)
PLATELET # BLD AUTO: 162 10E3/UL (ref 150–450)
POTASSIUM SERPL-SCNC: 4.8 MMOL/L (ref 3.4–5.3)
PROT SERPL-MCNC: 6.4 G/DL (ref 6.4–8.3)
PROT/CREAT 24H UR: NORMAL MG/G{CREAT}
RBC # BLD AUTO: 3.91 10E6/UL (ref 3.8–5.2)
SODIUM SERPL-SCNC: 141 MMOL/L (ref 136–145)
WBC # BLD AUTO: 7 10E3/UL (ref 4–11)

## 2023-07-20 PROCEDURE — 80053 COMPREHEN METABOLIC PANEL: CPT | Mod: ZL

## 2023-07-20 PROCEDURE — 84156 ASSAY OF PROTEIN URINE: CPT | Mod: ZL

## 2023-07-20 PROCEDURE — 85049 AUTOMATED PLATELET COUNT: CPT | Mod: ZL

## 2023-07-20 PROCEDURE — 36415 COLL VENOUS BLD VENIPUNCTURE: CPT | Mod: ZL

## 2023-07-20 PROCEDURE — 59025 FETAL NON-STRESS TEST: CPT

## 2023-07-20 PROCEDURE — 87653 STREP B DNA AMP PROBE: CPT | Mod: ZL

## 2023-07-20 PROCEDURE — 76816 OB US FOLLOW-UP PER FETUS: CPT

## 2023-07-20 PROCEDURE — 99207 PR OB VISIT-NO CHARGE - GICH ONLY: CPT

## 2023-07-20 ASSESSMENT — PAIN SCALES - GENERAL: PAINLEVEL: MODERATE PAIN (4)

## 2023-07-20 NOTE — PROGRESS NOTES
OB Visit    S: Patient is feeling slightly uncomfortable today. Denies LOF, VB, or regular Ctx. +FM.    Concerns: headache, right upper quadrant pain, blurred vision. Has been taking BP at home and they have been 130/80s mostly had one that was 142/87.     O: LMP 2022 (Approximate)   Gen: Well-appearing, NAD  NST: NST  reactive, moderate variability, intermittent contractions, no decels      A/P:  Paula Hagan is a 28 year old  at 35w1d by 10 weeks 2 days   C/W LMP, here for return OB visit.  Fetal Pleural effusion- continue BPPs as well as NSTs. Effusion still present today.   Headache, blurred vision, rt upper quadrant pain- plan for PreE labs today. Pt to continue taking Bps at home. Will notify if over 140/90. Return precautions given. Discussed with patient that she should present to labor and delivery early if she notes increasing contractions due to needing NICU team for delivery and she will need to be transferred for this team. She is in agreement with this plan.      PNC: - Prenatal labs WNL:, Rh +, Rubella immune, , 3rd Trimester HGB 12.8, GBS TBD   Rhogam: NA  Genetics: invitae low risk   Imaging: Pleural effusion otherwise WNL anatomy   Immunizations: TDAP 23    RTC 1 week with BPP NST      Emily HICKMAN, FNP-C  2023 2:05 PM

## 2023-07-20 NOTE — NURSING NOTE
Patient presents to clinic for OB visit, patient is 35 weeks 1 day  Concerns of right upper quadrant pain, and headache on right side that seems to affect right eye sight and is not going away with water sleep and caffeine  /72   Pulse 78   Wt 72.2 kg (159 lb 1.6 oz)   LMP 11/16/2022 (Approximate)   BMI 27.31 kg/m    Valarie De Jesus LPN on 7/20/2023 at 2:19 PM

## 2023-07-22 LAB — GP B STREP DNA SPEC QL NAA+PROBE: NEGATIVE

## 2023-07-23 ENCOUNTER — HOSPITAL ENCOUNTER (OUTPATIENT)
Facility: OTHER | Age: 28
Discharge: HOME | End: 2023-07-23
Attending: OBSTETRICS & GYNECOLOGY | Admitting: OBSTETRICS & GYNECOLOGY
Payer: COMMERCIAL

## 2023-07-23 VITALS
HEART RATE: 107 BPM | TEMPERATURE: 97 F | DIASTOLIC BLOOD PRESSURE: 71 MMHG | SYSTOLIC BLOOD PRESSURE: 106 MMHG | OXYGEN SATURATION: 100 % | RESPIRATION RATE: 20 BRPM

## 2023-07-23 DIAGNOSIS — Z36.89 ENCOUNTER FOR TRIAGE IN PREGNANT PATIENT: Primary | ICD-10-CM

## 2023-07-23 LAB
ALBUMIN MFR UR ELPH: <4 MG/DL
ALBUMIN SERPL BCG-MCNC: 3.6 G/DL (ref 3.5–5.2)
ALP SERPL-CCNC: 94 U/L (ref 35–104)
ALT SERPL W P-5'-P-CCNC: 11 U/L (ref 0–50)
ANION GAP SERPL CALCULATED.3IONS-SCNC: 11 MMOL/L (ref 7–15)
AST SERPL W P-5'-P-CCNC: 16 U/L (ref 0–45)
BILIRUB SERPL-MCNC: 0.8 MG/DL
BUN SERPL-MCNC: 7.1 MG/DL (ref 6–20)
CALCIUM SERPL-MCNC: 9 MG/DL (ref 8.6–10)
CHLORIDE SERPL-SCNC: 106 MMOL/L (ref 98–107)
CREAT SERPL-MCNC: 0.67 MG/DL (ref 0.51–0.95)
CREAT UR-MCNC: 24.4 MG/DL
DEPRECATED HCO3 PLAS-SCNC: 22 MMOL/L (ref 22–29)
ERYTHROCYTE [DISTWIDTH] IN BLOOD BY AUTOMATED COUNT: 13.2 % (ref 10–15)
GFR SERPL CREATININE-BSD FRML MDRD: >90 ML/MIN/1.73M2
GLUCOSE SERPL-MCNC: 80 MG/DL (ref 70–99)
HCT VFR BLD AUTO: 33.6 % (ref 35–47)
HGB BLD-MCNC: 10.9 G/DL (ref 11.7–15.7)
HOLD SPECIMEN: NORMAL
MCH RBC QN AUTO: 29.1 PG (ref 26.5–33)
MCHC RBC AUTO-ENTMCNC: 32.4 G/DL (ref 31.5–36.5)
MCV RBC AUTO: 90 FL (ref 78–100)
PLATELET # BLD AUTO: 147 10E3/UL (ref 150–450)
POTASSIUM SERPL-SCNC: 3.8 MMOL/L (ref 3.4–5.3)
PROT SERPL-MCNC: 6.3 G/DL (ref 6.4–8.3)
PROT/CREAT 24H UR: NORMAL MG/G{CREAT}
RBC # BLD AUTO: 3.75 10E6/UL (ref 3.8–5.2)
SODIUM SERPL-SCNC: 139 MMOL/L (ref 136–145)
WBC # BLD AUTO: 7.2 10E3/UL (ref 4–11)

## 2023-07-23 PROCEDURE — 99214 OFFICE O/P EST MOD 30 MIN: CPT | Performed by: OBSTETRICS & GYNECOLOGY

## 2023-07-23 PROCEDURE — 36415 COLL VENOUS BLD VENIPUNCTURE: CPT | Performed by: OBSTETRICS & GYNECOLOGY

## 2023-07-23 PROCEDURE — 84156 ASSAY OF PROTEIN URINE: CPT | Performed by: OBSTETRICS & GYNECOLOGY

## 2023-07-23 PROCEDURE — 85027 COMPLETE CBC AUTOMATED: CPT | Performed by: OBSTETRICS & GYNECOLOGY

## 2023-07-23 PROCEDURE — 250N000011 HC RX IP 250 OP 636: Mod: JZ | Performed by: OBSTETRICS & GYNECOLOGY

## 2023-07-23 PROCEDURE — 96374 THER/PROPH/DIAG INJ IV PUSH: CPT

## 2023-07-23 PROCEDURE — G0463 HOSPITAL OUTPT CLINIC VISIT: HCPCS | Mod: 25

## 2023-07-23 PROCEDURE — 80053 COMPREHEN METABOLIC PANEL: CPT | Performed by: OBSTETRICS & GYNECOLOGY

## 2023-07-23 RX ORDER — METOCLOPRAMIDE HYDROCHLORIDE 5 MG/ML
10 INJECTION INTRAMUSCULAR; INTRAVENOUS EVERY 6 HOURS
Status: DISCONTINUED | OUTPATIENT
Start: 2023-07-23 | End: 2023-07-23 | Stop reason: HOSPADM

## 2023-07-23 RX ORDER — METOCLOPRAMIDE 10 MG/1
10 TABLET ORAL EVERY 6 HOURS PRN
Qty: 20 TABLET | Refills: 0 | Status: ON HOLD | OUTPATIENT
Start: 2023-07-23 | End: 2023-08-04

## 2023-07-23 RX ADMIN — METOCLOPRAMIDE 10 MG: 5 INJECTION, SOLUTION INTRAMUSCULAR; INTRAVENOUS at 11:13

## 2023-07-23 ASSESSMENT — ACTIVITIES OF DAILY LIVING (ADL)
ADLS_ACUITY_SCORE: 35

## 2023-07-23 NOTE — PROGRESS NOTES
Dr. Cortez at pt bedside at 11:30 to inform patient that discharge is appropriate at this time and plan is to recheck blood pressure at scheduled appointment this week. Verbal order to discharge pt. This RN verbalizes understanding. PIV discontinued.

## 2023-07-23 NOTE — PROGRESS NOTES
Data: [facility] presented to [facility]: 2023  8:56 AM.  Reason for maternal/fetal assessment is uterine contractions. Patient reports pain with these contractions. Patient denies leaking of vaginal fluid/rupture of membranes, vaginal bleeding, abdominal pain, significant edema. Patient reports fetal movement is normal. Patient is a 35w4d . Prenatal record reviewed. Pregnancy has been complicated by fetal pleuro-amniotic shunts, hx of fetal dryops and demise, hx of pre-e, and hx of gest DI. . Support person is present.     Fetal HR baseline was 145  , variability is moderate  . Accelerations: positive.   Decelerations: absent. Membranes: intact.    Vital signs wnl. Patient reports pain and is coping.     Action: Verbal consent for EFM. Triage assessment completed. MD aware of pt arrival, will wait for further orders. Pt is stable at this time.

## 2023-07-23 NOTE — PROGRESS NOTES
Obstetrics Triage Note    HPI:  Paula Hagan is a 28 year old  female at 35w4d by LMP c/w 10w2d US, pregnancy complicated by fetus with pleural effusions s/p thoracoamniotic shunt placement and history of preeclampsia, here with complaints of contractions, headache, blurred vision, RUQ pain.      Patient states that her contractions are mild but every 20-30 minutes. She reports a headache since  that does not respond to tylenol Checking BPs at home- mostly normal with occasional mild BP. Blurry vision if she stands up too fast. RUQ pain started last night, woke her from sleep.  No chest pain or SOB. Starting to get swelling in her feet and legs.    ROS:  Negative except as mentioned in HPI.    PMH:  Past Medical History:   Diagnosis Date     History of diabetes insipidus     in first pregnancy     History of gestational hypertension      PID (pelvic inflammatory disease)      Uncomplicated asthma      Varicella        PSHx:  Past Surgical History:   Procedure Laterality Date      SECTION  2018      SECTION N/A 2021    Procedure:  SECTION;  Surgeon: Seth Morfin MD;  Location:  OR     INSERT FETAL THORACOAMNIOTIC SHUNT N/A 2023    Procedure: INSERTION, SHUNT, THORACOAMNIOTIC  Plan for thoracentesis only 23. Fluid will be sent to lab. No shunt placement per MD.;  Surgeon: Abrahan Santos MD;  Location: [facility] L[facility]     INSERT FETAL THORACOAMNIOTIC SHUNT Bilateral 2023    Procedure: INSERTION, SHUNT, THORACOAMNIOTIC BILATERAL;  Surgeon: Abrahan Santos MD;  Location: [facility] L[facility]       Medications:  No current facility-administered medications on file prior to encounter.  albuterol (PROAIR HFA/PROVENTIL HFA/VENTOLIN HFA) 108 (90 Base) MCG/ACT inhaler, Inhale 1-2 puffs into the lungs every 4 hours as needed  aspirin (ASA) 81 MG EC tablet, Take 1 tablet (81 mg) by mouth daily  calcium carbonate (TUMS) 500 MG chewable tablet, Take 1 chew tab by mouth 2 times  daily  Prenatal Vit-Fe Fumarate-FA (PRENATAL MULTIVITAMIN W/IRON) 27-0.8 MG tablet, Take 1 tablet by mouth daily         Allergies:     Allergies   Allergen Reactions     Bee Venom Anaphylaxis     Morphine Itching and Hives     Tolerates HYDROmorphone (DILUDID)  Tolerates HYDROmorphone (DILUDID)     Buspirone Other (See Comments)     Headache, migraine  migraines     Sertraline Muscle Pain (Myalgia)       Physical Exam:   Vitals:    07/23/23 0901 07/23/23 0904 07/23/23 0944   BP: 135/89 (!) 127/92 (!) 128/94   BP Location: Right arm Right arm Right arm   Patient Position: Semi-Enriquez's Semi-Enriquez's Semi-Enriquez's   Cuff Size: Adult Regular Adult Regular Adult Regular   Pulse: 107     Resp: 20     Temp: (!) 96.6  F (35.9  C)     TempSrc: Temporal     SpO2: 100%        Gen: resting comfortably, in NAD  CV: RRR, no m/r/g  Pulm: CTAB, no increased work of breathing  Abd: soft, gravid, non-distended. +tenderness in RUQ  Ext: 1+ edema, nontender. 3+ DTRs, no clonus.  Cx: closed    NST:  FHT: , mod noris, + accels, no decels  Conrad: 1 ctx every 10-30 minutes    Labs/Imaging:  Results for orders placed or performed during the hospital encounter of 07/23/23 (from the past 24 hour(s))   CBC with platelets   Result Value Ref Range    WBC Count 7.2 4.0 - 11.0 10e3/uL    RBC Count 3.75 (L) 3.80 - 5.20 10e6/uL    Hemoglobin 10.9 (L) 11.7 - 15.7 g/dL    Hematocrit 33.6 (L) 35.0 - 47.0 %    MCV 90 78 - 100 fL    MCH 29.1 26.5 - 33.0 pg    MCHC 32.4 31.5 - 36.5 g/dL    RDW 13.2 10.0 - 15.0 %    Platelet Count 147 (L) 150 - 450 10e3/uL   Comprehensive Metabolic Panel   Result Value Ref Range    Sodium 139 136 - 145 mmol/L    Potassium 3.8 3.4 - 5.3 mmol/L    Chloride 106 98 - 107 mmol/L    Carbon Dioxide (CO2) 22 22 - 29 mmol/L    Anion Gap 11 7 - 15 mmol/L    Urea Nitrogen 7.1 6.0 - 20.0 mg/dL    Creatinine 0.67 0.51 - 0.95 mg/dL    Calcium 9.0 8.6 - 10.0 mg/dL    Glucose 80 70 - 99 mg/dL    Alkaline Phosphatase 94 35 - 104 U/L     AST 16 0 - 45 U/L    ALT 11 0 - 50 U/L    Protein Total 6.3 (L) 6.4 - 8.3 g/dL    Albumin 3.6 3.5 - 5.2 g/dL    Bilirubin Total 0.8 <=1.2 mg/dL    GFR Estimate >90 >60 mL/min/1.73m2   Extra Tube    Narrative    The following orders were created for panel order Extra Tube.  Procedure                               Abnormality         Status                     ---------                               -----------         ------                     Extra Serum Separator Tu...[[id number]]                      Final result                 Please view results for these tests on the individual orders.   Extra Serum Separator Tube (SST)   Result Value Ref Range    Hold Specimen JIC    Protein  random urine   Result Value Ref Range    Total Protein Urine mg/dL <4.0   mg/dL    Total Protein UR MG/MG CR      Creatinine Urine mg/dL 24.4 mg/dL       Assessment/Plan: Paula Hagan is a 28 year old  at 35w4d by LMP c/w 10w2d US, here for PreEclampsia concerns. Cervix is closed and contractions infrequent, low suspicion for labor. BPs mostly normal with occasional mild range. She received IV reglan with improvement in headache. HELLP labs notable for platelet count of 147, but otherwise normal and UPC normal. .   - Dispo: to home with follow up in the next week. Discussed tylenol for pain as needed. Discussed labor warning signs and indication to return to care.    Alicia Cortez MD  OBGYN  2023 10:27 AM

## 2023-07-27 ENCOUNTER — PRENATAL OFFICE VISIT (OUTPATIENT)
Dept: OBGYN | Facility: OTHER | Age: 28
End: 2023-07-27
Attending: OBSTETRICS & GYNECOLOGY
Payer: COMMERCIAL

## 2023-07-27 ENCOUNTER — HOSPITAL ENCOUNTER (OUTPATIENT)
Dept: ULTRASOUND IMAGING | Facility: OTHER | Age: 28
Discharge: HOME | End: 2023-07-27
Attending: OBSTETRICS & GYNECOLOGY
Payer: COMMERCIAL

## 2023-07-27 VITALS
DIASTOLIC BLOOD PRESSURE: 72 MMHG | HEART RATE: 102 BPM | WEIGHT: 157 LBS | SYSTOLIC BLOOD PRESSURE: 120 MMHG | BODY MASS INDEX: 26.95 KG/M2

## 2023-07-27 DIAGNOSIS — O09.93 HIGH-RISK PREGNANCY IN THIRD TRIMESTER: Primary | ICD-10-CM

## 2023-07-27 DIAGNOSIS — O35.CXX0 PLEURAL EFFUSION, FETAL, AFFECTING CARE OF MOTHER, ANTEPARTUM: ICD-10-CM

## 2023-07-27 PROCEDURE — 59025 FETAL NON-STRESS TEST: CPT | Performed by: OBSTETRICS & GYNECOLOGY

## 2023-07-27 PROCEDURE — 99207 PR OB VISIT-NO CHARGE - GICH ONLY: CPT | Performed by: OBSTETRICS & GYNECOLOGY

## 2023-07-27 PROCEDURE — 76816 OB US FOLLOW-UP PER FETUS: CPT

## 2023-07-27 PROCEDURE — 59426 ANTEPARTUM CARE ONLY: CPT | Performed by: OBSTETRICS & GYNECOLOGY

## 2023-07-27 ASSESSMENT — PAIN SCALES - GENERAL: PAINLEVEL: NO PAIN (0)

## 2023-07-27 NOTE — NURSING NOTE
"Chief Complaint   Patient presents with    Prenatal Care     36 w 1 d     Pt presents to clinic today for prenatal care 36w 1d. Pt denies any bleeding, or leakage of fluid at this time. States baby is moving a little less today. NST done today. Patient c/ocontractions.    Initial /72   Pulse 102   Wt 71.2 kg (157 lb)   LMP 11/16/2022 (Approximate)   BMI 26.95 kg/m   Estimated body mass index is 26.95 kg/m  as calculated from the following:    Height as of 12/15/22: 1.626 m (5' 4\").    Weight as of this encounter: 71.2 kg (157 lb).  Medication Reconciliation: complete          Sangeeta Nova LPN    "

## 2023-07-27 NOTE — PROGRESS NOTES
Problem List:   Pregnancy risk factors include:  Hx of preeclampsia  Hx of gestational DI- monitor for polydipsia, polyuria  Hx of hydrops with fetal demise in mid-trimester  S/p fetal pleuro-amnionic shunts. Plan [place] delivery    CC: Recheck OB visit at 36w1d    HPI: Paula Hagan presents for a routine OB visit now at 36w1d  Concerns: some sporadic contractions    Patient notices normal fetal movement, denies contractions, vaginal bleeding or leaking of fluid.    OB History    Para Term  AB Living   4 2 2 0 1 2   SAB IAB Ectopic Multiple Live Births   0 0 0 0 2      # Outcome Date GA Lbr Femi/2nd Weight Sex Delivery Anes PTL Lv   4 Current            3 Term 21 37w1d  3.189 kg (7 lb 0.5 oz) M CS-LTranv   LES      Complications: Preeclampsia/Hypertension      Name: THERESA ARGUETA      Apgar1: 8  Apgar5: 8   2 AB 19 18w1d   M          Birth Comments: Fetal Hydrops   1 Term 18   3.201 kg (7 lb 0.9 oz) M CS-LVertical Gen  LES      Complications: Fetal Intolerance, Failure to Progress in First Stage      Name: ERIC ARGUETA      Apgar1: 7  Apgar5: 8     Current Outpatient Medications   Medication    albuterol (PROAIR HFA/PROVENTIL HFA/VENTOLIN HFA) 108 (90 Base) MCG/ACT inhaler    aspirin (ASA) 81 MG EC tablet    calcium carbonate (TUMS) 500 MG chewable tablet    metoclopramide (REGLAN) 10 MG tablet    Prenatal Vit-Fe Fumarate-FA (PRENATAL MULTIVITAMIN W/IRON) 27-0.8 MG tablet     No current facility-administered medications for this visit.     O: /72   Pulse 102   Wt 71.2 kg (157 lb)   LMP 2022 (Approximate)   BMI 26.95 kg/m    Body mass index is 26.95 kg/m .  See OB flow sheet  EXAM:  NAD    NST doucmentation:    Indication: (O09.93) High-risk pregnancy in third trimester  (primary encounter diagnosis)  Comment:   Plan: FETAL NON-STRESS TEST            Duration: 30 min     36w1d  FHR baseline: 130 with moderate variability  Accelerations: y  Decelerations:  n  Contractions: n    Category 1    No results found for any visits on 23.    A/P: (O09.93) High-risk pregnancy in third trimester  (primary encounter diagnosis)  Comment:   Plan: FETAL NON-STRESS TEST          Patient scheduled for repeat  in [facility] for likely need for NICU cares for baby with persistent pleural effusions.    Seth Morfin MD FACOG  2:03 PM 2023

## 2023-08-01 ENCOUNTER — OFFICE VISIT (OUTPATIENT)
Dept: MATERNAL FETAL MEDICINE | Facility: CLINIC | Age: 28
End: 2023-08-01
Attending: OBSTETRICS & GYNECOLOGY
Payer: COMMERCIAL

## 2023-08-01 ENCOUNTER — ANESTHESIA EVENT (OUTPATIENT)
Dept: OBGYN | Facility: CLINIC | Age: 28
End: 2023-08-01
Payer: COMMERCIAL

## 2023-08-01 ENCOUNTER — HOSPITAL ENCOUNTER (OUTPATIENT)
Dept: ULTRASOUND IMAGING | Facility: CLINIC | Age: 28
Discharge: HOME | End: 2023-08-01
Attending: OBSTETRICS & GYNECOLOGY
Payer: COMMERCIAL

## 2023-08-01 VITALS
WEIGHT: 160 LBS | SYSTOLIC BLOOD PRESSURE: 122 MMHG | OXYGEN SATURATION: 100 % | RESPIRATION RATE: 16 BRPM | HEART RATE: 96 BPM | BODY MASS INDEX: 27.46 KG/M2 | DIASTOLIC BLOOD PRESSURE: 81 MMHG

## 2023-08-01 DIAGNOSIS — O36.23X0 HYDROPS FETALIS IN THIRD TRIMESTER, SINGLE OR UNSPECIFIED FETUS: ICD-10-CM

## 2023-08-01 DIAGNOSIS — O09.293 HX OF PREECLAMPSIA, PRIOR PREGNANCY, CURRENTLY PREGNANT, THIRD TRIMESTER: ICD-10-CM

## 2023-08-01 DIAGNOSIS — O09.293 PRIOR PREGNANCY WITH FETAL DEMISE AND CURRENT PREGNANCY IN THIRD TRIMESTER: ICD-10-CM

## 2023-08-01 DIAGNOSIS — O34.219 HISTORY OF CESAREAN SECTION COMPLICATING PREGNANCY: ICD-10-CM

## 2023-08-01 DIAGNOSIS — O09.293 PRIOR PREGNANCY WITH FETAL DEMISE AND CURRENT PREGNANCY IN THIRD TRIMESTER: Primary | ICD-10-CM

## 2023-08-01 DIAGNOSIS — O35.CXX0 PLEURAL EFFUSION, FETAL, AFFECTING CARE OF MOTHER, ANTEPARTUM: Primary | ICD-10-CM

## 2023-08-01 PROCEDURE — 99214 OFFICE O/P EST MOD 30 MIN: CPT | Mod: 25 | Performed by: ADVANCED PRACTICE MIDWIFE

## 2023-08-01 PROCEDURE — 99213 OFFICE O/P EST LOW 20 MIN: CPT | Mod: 25 | Performed by: ADVANCED PRACTICE MIDWIFE

## 2023-08-01 PROCEDURE — 76819 FETAL BIOPHYS PROFIL W/O NST: CPT | Mod: 26 | Performed by: OBSTETRICS & GYNECOLOGY

## 2023-08-01 PROCEDURE — 76816 OB US FOLLOW-UP PER FETUS: CPT | Mod: 26 | Performed by: OBSTETRICS & GYNECOLOGY

## 2023-08-01 PROCEDURE — 76816 OB US FOLLOW-UP PER FETUS: CPT

## 2023-08-01 PROCEDURE — 86780 TREPONEMA PALLIDUM: CPT | Performed by: ADVANCED PRACTICE MIDWIFE

## 2023-08-01 PROCEDURE — 76819 FETAL BIOPHYS PROFIL W/O NST: CPT

## 2023-08-01 NOTE — ANESTHESIA PREPROCEDURE EVALUATION
Anesthesia Pre-Procedure Evaluation    Patient: Paula Hagan   MRN: [id number] : 1995        Procedure : Procedure(s):   SECTION          Past Medical History:   Diagnosis Date    History of diabetes insipidus     in first pregnancy    History of gestational hypertension     PID (pelvic inflammatory disease)     Uncomplicated asthma     Varicella       Past Surgical History:   Procedure Laterality Date     SECTION  2018     SECTION N/A 2021    Procedure:  SECTION;  Surgeon: Seth Morfin MD;  Location: [facility] OR    INSERT FETAL THORACOAMNIOTIC SHUNT N/A 2023    Procedure: INSERTION, SHUNT, THORACOAMNIOTIC  Plan for thoracentesis only 23. Fluid will be sent to lab. No shunt placement per MD.;  Surgeon: Abrahan Santos MD;  Location: [facility] L[facility]    INSERT FETAL THORACOAMNIOTIC SHUNT Bilateral 2023    Procedure: INSERTION, SHUNT, THORACOAMNIOTIC BILATERAL;  Surgeon: Abrahan Santos MD;  Location: [facility]      Allergies   Allergen Reactions    Bee Venom Anaphylaxis    Morphine Itching and Hives     Tolerates HYDROmorphone (DILUDID)  Tolerates HYDROmorphone (DILUDID)    Buspirone Other (See Comments)     Headache, migraine  migraines    Sertraline Muscle Pain (Myalgia)      Social History     Tobacco Use    Smoking status: Never    Smokeless tobacco: Never   Substance Use Topics    Alcohol use: Not Currently      Wt Readings from Last 1 Encounters:   23 72.6 kg (160 lb)        Anesthesia Evaluation   Pt has had prior anesthetic.         ROS/MED HX  ENT/Pulmonary: Comment: # asthma, remote hx    (+)                     asthma                  Neurologic:       Cardiovascular:       METS/Exercise Tolerance:     Hematologic:       Musculoskeletal:       GI/Hepatic:       Renal/Genitourinary:       Endo:       Psychiatric/Substance Use:       Infectious Disease:       Malignancy:       Other:   27 yo  at 37w0d  # fetal pleural effusions s/p  pleuro-amniotic shunts  # Hx Csx x2  # Hx PreE  # Hx gestational DI  # Hx fetal demise 2/2 hydrops   # GDM and hypothyroidism during prior pregancy          Physical Exam    Airway        Mallampati: I   TM distance: > 3 FB   Neck ROM: full   Mouth opening: > 3 cm    Respiratory Devices and Support         Dental       (+) Minor Abnormalities - some fillings, tiny chips      Cardiovascular   cardiovascular exam normal          Pulmonary   pulmonary exam normal                OUTSIDE LABS:  CBC:   Lab Results   Component Value Date    WBC 7.6 08/01/2023    WBC 7.2 07/23/2023    HGB 11.5 (L) 08/01/2023    HGB 10.9 (L) 07/23/2023    HCT 35.8 08/01/2023    HCT 33.6 (L) 07/23/2023     08/01/2023     (L) 07/23/2023     BMP:   Lab Results   Component Value Date     07/23/2023     07/20/2023    POTASSIUM 3.8 07/23/2023    POTASSIUM 4.8 07/20/2023    CHLORIDE 106 07/23/2023    CHLORIDE 108 (H) 07/20/2023    CO2 22 07/23/2023    CO2 23 07/20/2023    BUN 7.1 07/23/2023    BUN 6.6 07/20/2023    CR 0.67 07/23/2023    CR 0.66 07/20/2023    GLC 80 07/23/2023    GLC 83 07/20/2023     COAGS:   Lab Results   Component Value Date    INR 0.81 04/09/2019     POC:   Lab Results   Component Value Date    HCG Negative 02/21/2022     HEPATIC:   Lab Results   Component Value Date    ALBUMIN 3.6 07/23/2023    PROTTOTAL 6.3 (L) 07/23/2023    ALT 11 07/23/2023    AST 16 07/23/2023    ALKPHOS 94 07/23/2023    BILITOTAL 0.8 07/23/2023     OTHER:   Lab Results   Component Value Date    LYDIA 9.0 07/23/2023    TSH 2.38 05/11/2023    T4 0.69 12/03/2020       Anesthesia Plan    ASA Status:  2    NPO Status:  NPO Appropriate    Anesthesia Type: Spinal.              Consents    Anesthesia Plan(s) and associated risks, benefits, and realistic alternatives discussed. Questions answered and patient/representative(s) expressed understanding.     - Discussed: Risks, Benefits and Alternatives for the PROCEDURE were discussed     -  Discussed with:  Patient      - Extended Intubation/Ventilatory Support Discussed: No.      - Patient is DNR/DNI Status: No     Use of blood products discussed: No .     Postoperative Care    Pain management: IV analgesics, intrathecal morphine, Neuraxial analgesia, Peripheral nerve block (Single Shot), Multi-modal analgesia.   PONV prophylaxis: Ondansetron (or other 5HT-3)     Comments:                Marvin Islas MD

## 2023-08-01 NOTE — NURSING NOTE
Paula seen in clinic today for OB visit at 36w6d gestation. VSS. Pt reports pos fetal movement, see flowsheet. Pt evaluated for  labor, preeclampsia, infection, fetal wellbeing without concerns. Pt denies bleeding/lof/change in vaginal discharge/contractions/headache/vision changes/chest pain/SOB/edema. Home BPs WNL, HA resolved from last week. Pt given preop wash/drink, written instructions for prep/shower. NPO after midnight with exception of preop drink (supplied). Sheba Rush CNM met with pt and discussed POC. Plan for delivery tomorrow as scheduled. Pt discharged stable and ambulatory to lab for preop labs.

## 2023-08-01 NOTE — PROGRESS NOTES
"Maternal fetal Medicine OB Follow up visit.     Felix Hagan  : 1995  MRN: [id number]    CC: OB Follow-up    Subjective:  Felix Hagan is a 28 year old  at 36w6d presenting for routine OB follow-up. Today, she is with her , ready for CS in the AM.    Patient denies regular, painful contractions, denies loss of fluid or vaginal bleeding.  Reports fetal movement.      Patient also denies any recent fevers/chills, headaches or changes in vision, RUQ pain, nausea or vomiting, constipation, diarrhea or other systemic symptoms.      OB Hx:  OB History    Para Term  AB Living   4 2 2 0 1 2   SAB IAB Ectopic Multiple Live Births   0 0 0 0 2      # Outcome Date GA Lbr Femi/2nd Weight Sex Delivery Anes PTL Lv   4 Current            3 Term 21 37w1d  3.189 kg (7 lb 0.5 oz) M CS-LTranv   LES      Complications: Preeclampsia/Hypertension      Name: ELLIECHRISTOPHERFELIX      Apgar1: 8  Apgar5: 8   2 AB 19 18w1d   M          Birth Comments: Fetal Hydrops   1 Term 18   3.201 kg (7 lb 0.9 oz) M CS-LVertical Gen  LES      Complications: Fetal Intolerance, Failure to Progress in First Stage      Name: ERIC ARGUETA      Apgar1: 7  Apgar5: 8         Objective:  /81 (BP Location: Left arm, Patient Position: Sitting, Cuff Size: Adult Regular)   Pulse 96   Resp 16   Wt 72.6 kg (160 lb)   LMP 2022 (Approximate)   SpO2 100%   BMI 27.46 kg/m      Gen: alert, oriented, NAD  Skin: warm, dry, intact  Respiratory: breathing nm, no SOB  Psych: mood stable,      OB Ultrasound:  Please see \"imaging\" tab under chart review for today's ultrasound results.      Assessment/Plan:  28 year old  at 36w6d here for follow OB visit.      Pregnancy complicated by:  1.  Fetal bilateral pleural effusions s/p bilateral thoracentesis 23 and thoracoamniotic shunt bilaterally on 23.  Has had amniocentesis and evaluation of thoracic fluid for chylothorax (negative).  2.  Prior  " "section x 2  3.  History of cystic hygroma with 18 week fetal demise in prior pregnancy  4.  History of gestational hypertension in 2 prior pregnancies (never received magnesium, urine protein creatinine ratio <0.3, mild range BPs)  5. History of gestational diabetes insipidus in first pregnancy not in second pregnancy. She denies history of gestational diabetes.  Has not had any concerning symptoms this pregnancy.      Fetal bilateral pleural effusions s/p bilateral thoracentesis and shunt, now with increasing left pleural effusion  - See Plan outlined in today's US dated 6/29/23, in Chart Review under \"Imaging\" tab  - Weekly assessment of the pleural effusions, hydrops assessment and BPP (sent in-box message to Dr. Morfin to determine if can be done locally or through telemedicine; next ultrasound scheduled in their office on 7/6/2023)  - s/p BMTZ 4/20-4/21  - Amniocentesis for chromosomal microarray, whole exome sequencing, and for CMV/Parvovirus PCR negative.  - Considering possible whole genome sequencing, considering inpatient genetics consult at time of delivery     History of gestational hypertension  - has baseline pre-eclampsia labs  - normotensive  - on ASA 81 mg  - signs/symptoms of pre-eclampsia reviewed     History of gestational diabetes inspidius  - denies history of gestational diabetes  - denies any concerns of polyuria or polydipsia  - has been off of Desmopressin since 2018 and states this was only associated with that pregnancy  - 1 hr , per patient this is below Dr. Morfin's threshold for 3 hr GTT     Prenatal care  - continued care locally with Dr. Morfin, next visit in 1 week  - return OB visit with MFM at 36w6d for repeat assessment of fetal growth, hydrops and BPP, as well as plan for preoperative labs  - RCS scheduled today at [facility] for 37w0d, 8/2/23 at 0830 with Dr. Reed  - Rh positive  - TDAP 6/29  - hgb 5/11/23 = 12.8  -   - RPR 5/11/23 = NR    Surgical " labs ordered.  Knows where and when to come to labor unit tomorrow.    30 minutes spent on the date of the encounter doing chart review, history and exam, documentation and further activities per the note.    Sheba Rush, DNP, APRN, CNM, FACNM

## 2023-08-02 ENCOUNTER — HOSPITAL ENCOUNTER (INPATIENT)
Facility: CLINIC | Age: 28
LOS: 2 days | Discharge: HOME HEALTH | End: 2023-08-04
Attending: OBSTETRICS & GYNECOLOGY | Admitting: OBSTETRICS & GYNECOLOGY
Payer: COMMERCIAL

## 2023-08-02 ENCOUNTER — ANESTHESIA (OUTPATIENT)
Dept: OBGYN | Facility: CLINIC | Age: 28
End: 2023-08-02
Payer: COMMERCIAL

## 2023-08-02 DIAGNOSIS — O13.9 GESTATIONAL HYPERTENSION, ANTEPARTUM: ICD-10-CM

## 2023-08-02 DIAGNOSIS — Z98.891 S/P CESAREAN SECTION: Primary | ICD-10-CM

## 2023-08-02 PROBLEM — N73.9 PID (PELVIC INFLAMMATORY DISEASE): Status: ACTIVE | Noted: 2023-08-02

## 2023-08-02 LAB
ALT SERPL W P-5'-P-CCNC: 9 U/L (ref 0–50)
AST SERPL W P-5'-P-CCNC: 20 U/L (ref 0–45)
CREAT SERPL-MCNC: 0.5 MG/DL (ref 0.51–0.95)
ERYTHROCYTE [DISTWIDTH] IN BLOOD BY AUTOMATED COUNT: 12.9 % (ref 10–15)
GFR SERPL CREATININE-BSD FRML MDRD: >90 ML/MIN/1.73M2
HCT VFR BLD AUTO: 33.1 % (ref 35–47)
HGB BLD-MCNC: 10.4 G/DL (ref 11.7–15.7)
HGB BLD-MCNC: 10.8 G/DL (ref 11.7–15.7)
MCH RBC QN AUTO: 28.2 PG (ref 26.5–33)
MCHC RBC AUTO-ENTMCNC: 31.4 G/DL (ref 31.5–36.5)
MCV RBC AUTO: 90 FL (ref 78–100)
PLATELET # BLD AUTO: 158 10E3/UL (ref 150–450)
RBC # BLD AUTO: 3.69 10E6/UL (ref 3.8–5.2)
T PALLIDUM AB SER QL: NONREACTIVE
WBC # BLD AUTO: 11.1 10E3/UL (ref 4–11)

## 2023-08-02 PROCEDURE — 258N000003 HC RX IP 258 OP 636: Performed by: STUDENT IN AN ORGANIZED HEALTH CARE EDUCATION/TRAINING PROGRAM

## 2023-08-02 PROCEDURE — 86780 TREPONEMA PALLIDUM: CPT

## 2023-08-02 PROCEDURE — 84460 ALANINE AMINO (ALT) (SGPT): CPT

## 2023-08-02 PROCEDURE — 82565 ASSAY OF CREATININE: CPT

## 2023-08-02 PROCEDURE — 258N000003 HC RX IP 258 OP 636

## 2023-08-02 PROCEDURE — 360N000076 HC SURGERY LEVEL 3, PER MIN: Performed by: OBSTETRICS & GYNECOLOGY

## 2023-08-02 PROCEDURE — 85018 HEMOGLOBIN: CPT

## 2023-08-02 PROCEDURE — 370N000017 HC ANESTHESIA TECHNICAL FEE, PER MIN: Performed by: OBSTETRICS & GYNECOLOGY

## 2023-08-02 PROCEDURE — 250N000011 HC RX IP 250 OP 636

## 2023-08-02 PROCEDURE — 999N000141 HC STATISTIC PRE-PROCEDURE NURSING ASSESSMENT: Performed by: OBSTETRICS & GYNECOLOGY

## 2023-08-02 PROCEDURE — C9290 INJ, BUPIVACAINE LIPOSOME: HCPCS | Performed by: ANESTHESIOLOGY

## 2023-08-02 PROCEDURE — 84450 TRANSFERASE (AST) (SGOT): CPT

## 2023-08-02 PROCEDURE — 120N000002 HC R&B MED SURG/OB UMMC

## 2023-08-02 PROCEDURE — 250N000013 HC RX MED GY IP 250 OP 250 PS 637

## 2023-08-02 PROCEDURE — 250N000011 HC RX IP 250 OP 636: Mod: JZ | Performed by: STUDENT IN AN ORGANIZED HEALTH CARE EDUCATION/TRAINING PROGRAM

## 2023-08-02 PROCEDURE — 59514 CESAREAN DELIVERY ONLY: CPT | Mod: GC | Performed by: OBSTETRICS & GYNECOLOGY

## 2023-08-02 PROCEDURE — 250N000009 HC RX 250: Performed by: STUDENT IN AN ORGANIZED HEALTH CARE EDUCATION/TRAINING PROGRAM

## 2023-08-02 PROCEDURE — 250N000009 HC RX 250

## 2023-08-02 PROCEDURE — 710N000010 HC RECOVERY PHASE 1, LEVEL 2, PER MIN: Performed by: OBSTETRICS & GYNECOLOGY

## 2023-08-02 PROCEDURE — 36415 COLL VENOUS BLD VENIPUNCTURE: CPT

## 2023-08-02 PROCEDURE — 272N000001 HC OR GENERAL SUPPLY STERILE: Performed by: OBSTETRICS & GYNECOLOGY

## 2023-08-02 PROCEDURE — 271N000001 HC OR GENERAL SUPPLY NON-STERILE: Performed by: OBSTETRICS & GYNECOLOGY

## 2023-08-02 PROCEDURE — 250N000011 HC RX IP 250 OP 636: Mod: JZ | Performed by: ANESTHESIOLOGY

## 2023-08-02 RX ORDER — PROCHLORPERAZINE 25 MG
25 SUPPOSITORY, RECTAL RECTAL EVERY 12 HOURS PRN
Status: DISCONTINUED | OUTPATIENT
Start: 2023-08-02 | End: 2023-08-04 | Stop reason: HOSPADM

## 2023-08-02 RX ORDER — ACETAMINOPHEN 325 MG/1
975 TABLET ORAL EVERY 6 HOURS
Status: DISCONTINUED | OUTPATIENT
Start: 2023-08-02 | End: 2023-08-04 | Stop reason: HOSPADM

## 2023-08-02 RX ORDER — METHOCARBAMOL 500 MG/1
500 TABLET, FILM COATED ORAL 4 TIMES DAILY PRN
Status: DISCONTINUED | OUTPATIENT
Start: 2023-08-02 | End: 2023-08-04 | Stop reason: HOSPADM

## 2023-08-02 RX ORDER — OXYTOCIN/0.9 % SODIUM CHLORIDE 30/500 ML
100-340 PLASTIC BAG, INJECTION (ML) INTRAVENOUS CONTINUOUS PRN
Status: DISCONTINUED | OUTPATIENT
Start: 2023-08-02 | End: 2023-08-04 | Stop reason: HOSPADM

## 2023-08-02 RX ORDER — OXYTOCIN/0.9 % SODIUM CHLORIDE 30/500 ML
340 PLASTIC BAG, INJECTION (ML) INTRAVENOUS CONTINUOUS PRN
Status: DISCONTINUED | OUTPATIENT
Start: 2023-08-02 | End: 2023-08-04 | Stop reason: HOSPADM

## 2023-08-02 RX ORDER — LIDOCAINE 40 MG/G
CREAM TOPICAL
Status: DISCONTINUED | OUTPATIENT
Start: 2023-08-02 | End: 2023-08-02

## 2023-08-02 RX ORDER — CITRIC ACID/SODIUM CITRATE 334-500MG
30 SOLUTION, ORAL ORAL
Status: COMPLETED | OUTPATIENT
Start: 2023-08-02 | End: 2023-08-02

## 2023-08-02 RX ORDER — BISACODYL 10 MG
10 SUPPOSITORY, RECTAL RECTAL DAILY PRN
Status: DISCONTINUED | OUTPATIENT
Start: 2023-08-04 | End: 2023-08-04 | Stop reason: HOSPADM

## 2023-08-02 RX ORDER — PROCHLORPERAZINE MALEATE 10 MG
10 TABLET ORAL EVERY 6 HOURS PRN
Status: DISCONTINUED | OUTPATIENT
Start: 2023-08-02 | End: 2023-08-04 | Stop reason: HOSPADM

## 2023-08-02 RX ORDER — NALOXONE HYDROCHLORIDE 0.4 MG/ML
0.2 INJECTION, SOLUTION INTRAMUSCULAR; INTRAVENOUS; SUBCUTANEOUS
Status: DISCONTINUED | OUTPATIENT
Start: 2023-08-02 | End: 2023-08-04 | Stop reason: HOSPADM

## 2023-08-02 RX ORDER — ONDANSETRON 4 MG/1
4 TABLET, ORALLY DISINTEGRATING ORAL EVERY 6 HOURS PRN
Status: DISCONTINUED | OUTPATIENT
Start: 2023-08-02 | End: 2023-08-04 | Stop reason: HOSPADM

## 2023-08-02 RX ORDER — ONDANSETRON 2 MG/ML
4 INJECTION INTRAMUSCULAR; INTRAVENOUS EVERY 6 HOURS PRN
Status: DISCONTINUED | OUTPATIENT
Start: 2023-08-02 | End: 2023-08-04 | Stop reason: HOSPADM

## 2023-08-02 RX ORDER — NALOXONE HYDROCHLORIDE 0.4 MG/ML
0.4 INJECTION, SOLUTION INTRAMUSCULAR; INTRAVENOUS; SUBCUTANEOUS
Status: DISCONTINUED | OUTPATIENT
Start: 2023-08-02 | End: 2023-08-04 | Stop reason: HOSPADM

## 2023-08-02 RX ORDER — OXYCODONE HYDROCHLORIDE 5 MG/1
5 TABLET ORAL EVERY 4 HOURS PRN
Status: DISCONTINUED | OUTPATIENT
Start: 2023-08-02 | End: 2023-08-02

## 2023-08-02 RX ORDER — MISOPROSTOL 200 UG/1
800 TABLET ORAL
Status: DISCONTINUED | OUTPATIENT
Start: 2023-08-02 | End: 2023-08-04 | Stop reason: HOSPADM

## 2023-08-02 RX ORDER — ALBUTEROL SULFATE 90 UG/1
1-2 AEROSOL, METERED RESPIRATORY (INHALATION) EVERY 4 HOURS PRN
Status: DISCONTINUED | OUTPATIENT
Start: 2023-08-02 | End: 2023-08-04 | Stop reason: HOSPADM

## 2023-08-02 RX ORDER — ACETAMINOPHEN 325 MG/1
975 TABLET ORAL ONCE
Status: COMPLETED | OUTPATIENT
Start: 2023-08-02 | End: 2023-08-02

## 2023-08-02 RX ORDER — AMOXICILLIN 250 MG
1 CAPSULE ORAL 2 TIMES DAILY
Status: DISCONTINUED | OUTPATIENT
Start: 2023-08-02 | End: 2023-08-04 | Stop reason: HOSPADM

## 2023-08-02 RX ORDER — METOCLOPRAMIDE 10 MG/1
10 TABLET ORAL EVERY 6 HOURS PRN
Status: DISCONTINUED | OUTPATIENT
Start: 2023-08-02 | End: 2023-08-04 | Stop reason: HOSPADM

## 2023-08-02 RX ORDER — METHYLERGONOVINE MALEATE 0.2 MG/ML
200 INJECTION INTRAVENOUS
Status: DISCONTINUED | OUTPATIENT
Start: 2023-08-02 | End: 2023-08-04 | Stop reason: HOSPADM

## 2023-08-02 RX ORDER — MISOPROSTOL 200 UG/1
400 TABLET ORAL
Status: DISCONTINUED | OUTPATIENT
Start: 2023-08-02 | End: 2023-08-04 | Stop reason: HOSPADM

## 2023-08-02 RX ORDER — OXYTOCIN 10 [USP'U]/ML
10 INJECTION, SOLUTION INTRAMUSCULAR; INTRAVENOUS
Status: DISCONTINUED | OUTPATIENT
Start: 2023-08-02 | End: 2023-08-04 | Stop reason: HOSPADM

## 2023-08-02 RX ORDER — AMOXICILLIN 250 MG
2 CAPSULE ORAL 2 TIMES DAILY
Status: DISCONTINUED | OUTPATIENT
Start: 2023-08-02 | End: 2023-08-04 | Stop reason: HOSPADM

## 2023-08-02 RX ORDER — LIDOCAINE 4 G/G
1 PATCH TOPICAL
Status: DISCONTINUED | OUTPATIENT
Start: 2023-08-02 | End: 2023-08-04 | Stop reason: HOSPADM

## 2023-08-02 RX ORDER — CEFAZOLIN SODIUM/WATER 2 G/20 ML
2 SYRINGE (ML) INTRAVENOUS
Status: COMPLETED | OUTPATIENT
Start: 2023-08-02 | End: 2023-08-02

## 2023-08-02 RX ORDER — NALBUPHINE HYDROCHLORIDE 20 MG/ML
2.5-5 INJECTION, SOLUTION INTRAMUSCULAR; INTRAVENOUS; SUBCUTANEOUS EVERY 6 HOURS PRN
Status: DISCONTINUED | OUTPATIENT
Start: 2023-08-02 | End: 2023-08-04 | Stop reason: HOSPADM

## 2023-08-02 RX ORDER — METOCLOPRAMIDE HYDROCHLORIDE 5 MG/ML
10 INJECTION INTRAMUSCULAR; INTRAVENOUS EVERY 6 HOURS PRN
Status: DISCONTINUED | OUTPATIENT
Start: 2023-08-02 | End: 2023-08-04 | Stop reason: HOSPADM

## 2023-08-02 RX ORDER — FENTANYL CITRATE-0.9 % NACL/PF 10 MCG/ML
100 PLASTIC BAG, INJECTION (ML) INTRAVENOUS EVERY 5 MIN PRN
Status: DISCONTINUED | OUTPATIENT
Start: 2023-08-02 | End: 2023-08-02 | Stop reason: HOSPADM

## 2023-08-02 RX ORDER — NIFEDIPINE 30 MG/1
30 TABLET, EXTENDED RELEASE ORAL DAILY
Status: DISCONTINUED | OUTPATIENT
Start: 2023-08-02 | End: 2023-08-04 | Stop reason: HOSPADM

## 2023-08-02 RX ORDER — OXYTOCIN/0.9 % SODIUM CHLORIDE 30/500 ML
PLASTIC BAG, INJECTION (ML) INTRAVENOUS CONTINUOUS PRN
Status: DISCONTINUED | OUTPATIENT
Start: 2023-08-02 | End: 2023-08-02

## 2023-08-02 RX ORDER — IBUPROFEN 800 MG/1
800 TABLET, FILM COATED ORAL EVERY 6 HOURS
Status: DISCONTINUED | OUTPATIENT
Start: 2023-08-03 | End: 2023-08-04 | Stop reason: HOSPADM

## 2023-08-02 RX ORDER — CARBOPROST TROMETHAMINE 250 UG/ML
250 INJECTION, SOLUTION INTRAMUSCULAR
Status: DISCONTINUED | OUTPATIENT
Start: 2023-08-02 | End: 2023-08-04 | Stop reason: HOSPADM

## 2023-08-02 RX ORDER — SIMETHICONE 80 MG
80 TABLET,CHEWABLE ORAL 4 TIMES DAILY PRN
Status: DISCONTINUED | OUTPATIENT
Start: 2023-08-02 | End: 2023-08-04 | Stop reason: HOSPADM

## 2023-08-02 RX ORDER — BUPIVACAINE HYDROCHLORIDE 7.5 MG/ML
INJECTION, SOLUTION INTRASPINAL
Status: COMPLETED | OUTPATIENT
Start: 2023-08-02 | End: 2023-08-02

## 2023-08-02 RX ORDER — PRENATAL VIT/IRON FUM/FOLIC AC 27MG-0.8MG
1 TABLET ORAL DAILY
Status: DISCONTINUED | OUTPATIENT
Start: 2023-08-02 | End: 2023-08-04 | Stop reason: HOSPADM

## 2023-08-02 RX ORDER — HYDROCORTISONE 25 MG/G
CREAM TOPICAL 3 TIMES DAILY PRN
Status: DISCONTINUED | OUTPATIENT
Start: 2023-08-02 | End: 2023-08-04 | Stop reason: HOSPADM

## 2023-08-02 RX ORDER — KETOROLAC TROMETHAMINE 30 MG/ML
30 INJECTION, SOLUTION INTRAMUSCULAR; INTRAVENOUS EVERY 6 HOURS
Status: COMPLETED | OUTPATIENT
Start: 2023-08-02 | End: 2023-08-03

## 2023-08-02 RX ORDER — FENTANYL CITRATE 50 UG/ML
INJECTION, SOLUTION INTRAMUSCULAR; INTRAVENOUS
Status: COMPLETED | OUTPATIENT
Start: 2023-08-02 | End: 2023-08-02

## 2023-08-02 RX ORDER — CALCIUM CARBONATE 500 MG/1
500 TABLET, CHEWABLE ORAL 2 TIMES DAILY
Status: DISCONTINUED | OUTPATIENT
Start: 2023-08-02 | End: 2023-08-04 | Stop reason: HOSPADM

## 2023-08-02 RX ORDER — DEXTROSE, SODIUM CHLORIDE, SODIUM LACTATE, POTASSIUM CHLORIDE, AND CALCIUM CHLORIDE 5; .6; .31; .03; .02 G/100ML; G/100ML; G/100ML; G/100ML; G/100ML
INJECTION, SOLUTION INTRAVENOUS CONTINUOUS
Status: DISCONTINUED | OUTPATIENT
Start: 2023-08-02 | End: 2023-08-04 | Stop reason: HOSPADM

## 2023-08-02 RX ORDER — BUPIVACAINE HYDROCHLORIDE 2.5 MG/ML
INJECTION, SOLUTION EPIDURAL; INFILTRATION; INTRACAUDAL
Status: DISCONTINUED | OUTPATIENT
Start: 2023-08-02 | End: 2023-08-02

## 2023-08-02 RX ORDER — TRANEXAMIC ACID 10 MG/ML
1 INJECTION, SOLUTION INTRAVENOUS EVERY 30 MIN PRN
Status: DISCONTINUED | OUTPATIENT
Start: 2023-08-02 | End: 2023-08-04 | Stop reason: HOSPADM

## 2023-08-02 RX ORDER — KETOROLAC TROMETHAMINE 30 MG/ML
INJECTION, SOLUTION INTRAMUSCULAR; INTRAVENOUS PRN
Status: DISCONTINUED | OUTPATIENT
Start: 2023-08-02 | End: 2023-08-02

## 2023-08-02 RX ORDER — LIDOCAINE 40 MG/G
CREAM TOPICAL
Status: DISCONTINUED | OUTPATIENT
Start: 2023-08-02 | End: 2023-08-04 | Stop reason: HOSPADM

## 2023-08-02 RX ORDER — CEFAZOLIN SODIUM/WATER 2 G/20 ML
2 SYRINGE (ML) INTRAVENOUS SEE ADMIN INSTRUCTIONS
Status: DISCONTINUED | OUTPATIENT
Start: 2023-08-02 | End: 2023-08-04 | Stop reason: HOSPADM

## 2023-08-02 RX ORDER — SODIUM CHLORIDE, SODIUM LACTATE, POTASSIUM CHLORIDE, CALCIUM CHLORIDE 600; 310; 30; 20 MG/100ML; MG/100ML; MG/100ML; MG/100ML
INJECTION, SOLUTION INTRAVENOUS CONTINUOUS PRN
Status: DISCONTINUED | OUTPATIENT
Start: 2023-08-02 | End: 2023-08-02

## 2023-08-02 RX ORDER — SODIUM CHLORIDE, SODIUM LACTATE, POTASSIUM CHLORIDE, CALCIUM CHLORIDE 600; 310; 30; 20 MG/100ML; MG/100ML; MG/100ML; MG/100ML
INJECTION, SOLUTION INTRAVENOUS CONTINUOUS
Status: DISCONTINUED | OUTPATIENT
Start: 2023-08-02 | End: 2023-08-04 | Stop reason: HOSPADM

## 2023-08-02 RX ORDER — MODIFIED LANOLIN
OINTMENT (GRAM) TOPICAL
Status: DISCONTINUED | OUTPATIENT
Start: 2023-08-02 | End: 2023-08-04 | Stop reason: HOSPADM

## 2023-08-02 RX ADMIN — FENTANYL CITRATE 15 MCG: 50 INJECTION, SOLUTION INTRAMUSCULAR; INTRAVENOUS at 08:38

## 2023-08-02 RX ADMIN — ACETAMINOPHEN 975 MG: 325 TABLET, FILM COATED ORAL at 14:16

## 2023-08-02 RX ADMIN — Medication 600 ML/HR: at 09:11

## 2023-08-02 RX ADMIN — PHENYLEPHRINE HYDROCHLORIDE 75 MCG/MIN: 10 INJECTION INTRAVENOUS at 08:40

## 2023-08-02 RX ADMIN — Medication 100 ML/HR: at 12:13

## 2023-08-02 RX ADMIN — SODIUM CITRATE AND CITRIC ACID MONOHYDRATE 30 ML: 500; 334 SOLUTION ORAL at 08:18

## 2023-08-02 RX ADMIN — BUPIVACAINE HYDROCHLORIDE IN DEXTROSE 1.6 ML: 7.5 INJECTION, SOLUTION SUBARACHNOID at 08:38

## 2023-08-02 RX ADMIN — KETOROLAC TROMETHAMINE 15 MG: 30 INJECTION, SOLUTION INTRAMUSCULAR at 09:49

## 2023-08-02 RX ADMIN — Medication 2 G: at 08:31

## 2023-08-02 RX ADMIN — BUPIVACAINE HYDROCHLORIDE 20 ML: 2.5 INJECTION, SOLUTION EPIDURAL; INFILTRATION; INTRACAUDAL at 10:10

## 2023-08-02 RX ADMIN — KETOROLAC TROMETHAMINE 30 MG: 30 INJECTION INTRAMUSCULAR; INTRAVENOUS at 17:01

## 2023-08-02 RX ADMIN — Medication 5 MG: at 17:09

## 2023-08-02 RX ADMIN — BUPIVACAINE 20 ML: 13.3 INJECTION, SUSPENSION, LIPOSOMAL INFILTRATION at 10:10

## 2023-08-02 RX ADMIN — SODIUM CHLORIDE, POTASSIUM CHLORIDE, SODIUM LACTATE AND CALCIUM CHLORIDE: 600; 310; 30; 20 INJECTION, SOLUTION INTRAVENOUS at 08:26

## 2023-08-02 RX ADMIN — LIDOCAINE PATCH 4% 1 PATCH: 40 PATCH TOPICAL at 19:45

## 2023-08-02 RX ADMIN — ACETAMINOPHEN 975 MG: 325 TABLET, FILM COATED ORAL at 08:19

## 2023-08-02 RX ADMIN — SODIUM CHLORIDE, POTASSIUM CHLORIDE, SODIUM LACTATE AND CALCIUM CHLORIDE 500 ML: 600; 310; 30; 20 INJECTION, SOLUTION INTRAVENOUS at 07:34

## 2023-08-02 RX ADMIN — NIFEDIPINE 30 MG: 30 TABLET, FILM COATED, EXTENDED RELEASE ORAL at 21:52

## 2023-08-02 RX ADMIN — ACETAMINOPHEN 975 MG: 325 TABLET, FILM COATED ORAL at 19:44

## 2023-08-02 RX ADMIN — SENNOSIDES AND DOCUSATE SODIUM 2 TABLET: 50; 8.6 TABLET ORAL at 19:45

## 2023-08-02 RX ADMIN — KETOROLAC TROMETHAMINE 30 MG: 30 INJECTION INTRAMUSCULAR; INTRAVENOUS at 23:11

## 2023-08-02 ASSESSMENT — ACTIVITIES OF DAILY LIVING (ADL)
ADLS_ACUITY_SCORE: 20
ADLS_ACUITY_SCORE: 24
ADLS_ACUITY_SCORE: 20
ADLS_ACUITY_SCORE: 24
ADLS_ACUITY_SCORE: 20
ADLS_ACUITY_SCORE: 24
ADLS_ACUITY_SCORE: 20

## 2023-08-02 NOTE — ANESTHESIA CARE TRANSFER NOTE
Patient: Paula Hagan    Procedure: Procedure(s):   SECTION       Diagnosis: Fetal hydrops [P83.2]  History of 2  sections [Z98.891]  History of pre-eclampsia [Z87.59]  History of IUFD [Z87.59]  Diagnosis Additional Information: No value filed.    Anesthesia Type:   Spinal     Note:    Oropharynx: oropharynx clear of all foreign objects and spontaneously breathing  Level of Consciousness: awake  Oxygen Supplementation: room air    Independent Airway: airway patency satisfactory and stable  Dentition: dentition unchanged  Vital Signs Stable: post-procedure vital signs reviewed and stable  Report to RN Given: handoff report given  Patient transferred to: PACU    Handoff Report: Identifed the Patient, Identified the Reponsible Provider, Reviewed the pertinent medical history, Discussed the surgical course, Reviewed Intra-OP anesthesia mangement and issues during anesthesia, Set expectations for post-procedure period and Allowed opportunity for questions and acknowledgement of understanding      Vitals:  Vitals Value Taken Time   /85 23 1032   Temp     Pulse 95 23 1035   Resp     SpO2 98 % 23 1035   Vitals shown include unvalidated device data.    Electronically Signed By: Marvin Islas MD  2023  10:36 AM

## 2023-08-02 NOTE — PROVIDER NOTIFICATION
08/02/23 1637   Provider Notification   Provider Name/Title Dr Calabrese   Method of Notification Electronic Page   Request Evaluate-Remote   Notification Reason Vital Signs Change  (2 elevated BP's within four hours)     Just a heads up, pt had two elevated BP's within four hours.  129/92 at 1414 and 144/113 at 1542 (was having a lot of pain, recheck was 125/88)

## 2023-08-02 NOTE — PROGRESS NOTES
See notes from SIMBA Yanez too.  Pt initially, did not want oxycodone, then requested 2.5 mg and declined toradol until 1700.  Provider notified by SIMBA Yanez.  Dr Parks updated on x 2 elevated BPs with repeat BPs WNL.  Per Dr. MORELAND, pt meets GHTN criteria.  Has h/o GHTN.  Per Dr. MOREALND  She will place HELLP labs.  At this time, continue with ordered VS checks for HTN.

## 2023-08-02 NOTE — PROGRESS NOTES
Please see the imaging tab for details of the ultrasound performed today.    Mariposa Vines MD  Specialist in Maternal-Fetal Medicine

## 2023-08-02 NOTE — PLAN OF CARE
Goal Outcome Evaluation:  OR to PACU Transfer Note  Data: Pt to OB PACU at 1030 via cart. PIV infusing NS with pitocin, second bag hang. Pt without complications, aguillon with clear urine to gravity, vitals WNL, pt does not complain of pain and/or nausea. TAP block done in OR  Interventions: IV to pump, monitors and alarms on, warm blankets, SCD on.  Response: stable.  at bedside. Cares explained. Questions asnwered.   Plan: Patient instructed to notify RN for pain or nausea, routine post op cares.

## 2023-08-02 NOTE — PROGRESS NOTES
"Brief Progress Note    Went to assess patient per RN request due to sudden onset numbness/tingling in middle and ring fingers of right hand.    Patient states those symptoms have since resolved, only lasted 10 minutes. Mostly concerned about pain management. Tearfully states \"everything hurts\". She declines oxycodone because she does not like the way it makes her feel. No other acute concerns.    BP (!) 129/92 (BP Location: Left arm, Patient Position: Semi-Enriquez's)   Pulse 78   Temp 97.7  F (36.5  C) (Oral)   Resp 16   LMP 11/16/2022 (Approximate)   SpO2 100%      Abdomen: Mildly tender to palpation, mildly distended  : Moderate amount of blood on pad, no active bleeding with fundal massage  Neuro: normal sensation and movement to bilateral hands and forearm    Low concern for neurological pathology. Pain management is main concern, will add lidocaine patched and Robaxin prn. No concern for intra-abdominal bleed at this time given normal vitals and minimal tenderness to palpation.    - Will continue to monitor    Jyothi Calabrese MD  Ob/Gyn Resident, PGY-2  8/2/2023 2:31 PM       "

## 2023-08-02 NOTE — PROGRESS NOTES
ANGELINA made referral to [facility]. Patient and  are aware they will receive email to complete background check.    ANGELINA Villarreal can be consulted if needs arise.    FRANKLIN Lew [facility]  Casual   On call pager number: [phone]

## 2023-08-02 NOTE — LACTATION NOTE
"This note was copied from a baby's chart.  Summary:    Reminders (delete this area when done):   Maternal sticky note for pump \"lcnfcc\"  Update \"Lactation\" HOT:   Top box is this copied and pasted admit note  Box 2 is \"lcbox2\"  Box 3 is \"lcbox3\"  Update \"Nurse ([facility])\" HOT  \"lcplan\" is the general admit info.  Amend as needed.  Update IBCLC column    Lactation Admission Note      Baby Information:  Infant's first name:  unknown  Infant medical history: intubated at 37 weeks gestation due to respiratory distress - had lots of clear/green thick secretions that were suctioned; Hydrops Fetalis; Echo done today per Paula      needed? No    Lactation goal (if known): breastfeeding exclusively  Lactation history: feeding previous 2 children, second child breastfeeding for 4 months; History of good milk supply     Mother's Information: Name: Paula  Occupation:   Age: 28 years  Delivery type:   - scheduled at 37 weeks due to history of previous infants and history of preeclampsia  [place]: [place], close to [place]  Pump for home use:     Partner's name:   Occupation:     Relevant maternal medical and social hx:       Information for the patient's mother:  Paula Hagan [[id number]]     Past Medical History:   Diagnosis Date    History of diabetes insipidus     in first pregnancy    History of gestational hypertension     Uncomplicated asthma     Varicella     ,   Information for the patient's mother:  Paula Hagan [[id number]]     Patient Active Problem List   Diagnosis    Encounter for triage in pregnant patient    History of     Anemia during pregnancy in third trimester    Anemia due to blood loss, acute    Status post thoracentesis    Fetal hydrops    PID (pelvic inflammatory disease)    PID (pelvic inflammatory disease)    PID (pelvic inflammatory disease)    PID (pelvic inflammatory disease)    PID (pelvic inflammatory disease)    S/P  section    ,     Relevant " maternal medications:       Maternal risk factors:       Admission Education given:  [x]Admission packet  []Kangaroo care  []Benefits of breast milk  []How breast milk is made  []Stages of milk production  []Milk supply/ goal volumes  []Hand expression  [x]Hands-on pumping  []Collecting, labeling, transporting milk  []Cleaning, sanitizing pump parts  []Storage of milk      aKren Moore RN, IBCLC   Lactation Consultant  Ascom: *[phone]  Office: [phone]

## 2023-08-02 NOTE — PROVIDER NOTIFICATION
08/02/23 1326   Provider Notification   Provider Name/Title Dr Calabrese   Method of Notification Electronic Page;Phone     Pt reporting numbness and tingling in R hand 2 middle fingers - never felt before.  Strong bilat hand grasp.  VSS.  Pt tense and anxious.  No s/s of Pre E/LAST.  Pt uncomfortable with incisional pain.  Declines any narcotics.  Warm pack given.  Dr Calabrese notified and to see pt.  Pt reported no longer feeling numbness/tingling.  Elevated DBP will recheck in 30 minutes.

## 2023-08-02 NOTE — H&P
OB History and Physical     Paula Hagan    MRN# [id number]  YOB: 1995      HPI: Paula Hagan is a 28 year old  at 37w0d by LMP c/w 10w2d US who presents today for RLTCS. Her current pregnancy is notable for bilateral fetal pleural effusions, s/p two thoracenteses and shunt placement. Most recent imaging shows only mild left sided effusion. Her previous pregnancies were notable for 1 fetal demise due to fetal hydrops, and two term pregnancies complicated by gHTN. She has had two prior  deliveries; the first of which was for failure to progress and fetal intolerance. Her deliveries were uncomplicated. Denies history of postpartum hemorrhage or pre-eclampsia. She reports a history of asthma, typically triggered by seasonal allergies. Her last inhaler use was 2 years ago. She reports good fetal movement. Denies LOF, vaginal bleeding, or contractions.  She denies fever, chills, SOB, chest pain, palpitations, N/V, LE swelling/tenderness. No concerns for headache, vision changes, RUQ or epigastric pain      Pregnancy notable for:   Fetal pleural effusions  Breech presentation  Hx of CS x2  Hx of gHTN  Asthma    Patient has been NPO since 8:30 pm    Prenatal Labs:   Lab Results   Component Value Date    ABO A 2021    RH Pos 2021    AS Negative 2023    HEPBANG Nonreactive 2023    HGB 10.8 (L) 2023       GBS Status:   GBS negative 2023      OBHX:   OB History    Para Term  AB Living   4 2 2 0 1 2   SAB IAB Ectopic Multiple Live Births   0 0 0 0 2      # Outcome Date GA Lbr Femi/2nd Weight Sex Delivery Anes PTL Lv   4 Current            3 Term 21 37w1d  3.189 kg (7 lb 0.5 oz) M CS-LTranv   LES      Complications: Preeclampsia/Hypertension      Name: THERESA ARGUETA      Apgar1: 8  Apgar5: 8   2 AB 19 18w1d   M          Birth Comments: Fetal Hydrops   1 Term 18   3.201 kg (7 lb 0.9 oz) M CS-LTranv Gen  LES      Complications:  Fetal Intolerance, Failure to Progress in First Stage      Name: ERIC ARGUETA      Apgar1: 7  Apgar5: 8       MedicalHX:   Past Medical History:   Diagnosis Date    History of diabetes insipidus     in first pregnancy    History of gestational hypertension     Uncomplicated asthma     Varicella        SurgicalHX:   Past Surgical History:   Procedure Laterality Date    APPENDECTOMY       SECTION  2018     SECTION N/A 2021    Procedure:  SECTION;  Surgeon: Seth Morfin MD;  Location: [facility] OR    INSERT FETAL THORACOAMNIOTIC SHUNT N/A 2023    Procedure: INSERTION, SHUNT, THORACOAMNIOTIC  Plan for thoracentesis only 23. Fluid will be sent to lab. No shunt placement per MD.;  Surgeon: Abrahan Santos MD;  Location: [facility]    INSERT FETAL THORACOAMNIOTIC SHUNT Bilateral 2023    Procedure: INSERTION, SHUNT, THORACOAMNIOTIC BILATERAL;  Surgeon: Abrahan Santos MD;  Location: [facility]       Medications:   No current facility-administered medications on file prior to encounter.  albuterol (PROAIR HFA/PROVENTIL HFA/VENTOLIN HFA) 108 (90 Base) MCG/ACT inhaler, Inhale 1-2 puffs into the lungs every 4 hours as needed  aspirin (ASA) 81 MG EC tablet, Take 1 tablet (81 mg) by mouth daily  Prenatal Vit-Fe Fumarate-FA (PRENATAL MULTIVITAMIN W/IRON) 27-0.8 MG tablet, Take 1 tablet by mouth daily        Allergies:  Allergies   Allergen Reactions    Bee Venom Anaphylaxis    Morphine Itching and Hives     Tolerates HYDROmorphone (DILUDID)  Tolerates HYDROmorphone (DILUDID)    Buspirone Other (See Comments)     Headache, migraine  migraines    Sertraline Muscle Pain (Myalgia)       FamilyHX:   History reviewed. No pertinent family history.    SocialHX:   Social History     Socioeconomic History    Marital status:    Tobacco Use    Smoking status: Never    Smokeless tobacco: Never   Vaping Use    Vaping Use: Never used   Substance and Sexual Activity    Alcohol use: Not Currently     Drug use: Never    Sexual activity: Yes     Partners: Male     Birth control/protection: None       ROS: ROS negative other than above    Physical Exam:  Patient Vitals for the past 24 hrs:   BP Temp Temp src Resp   23 0622 122/80 97.7  F (36.5  C) Oral 18     General: Appropriately interactive, NAD, appears generally well  CV: RRR, normal S1/S2, no m/r/g  Lungs: CTAB, non-labored breathing, no wheezes, rales, or rhonchi  Abdomen: soft, gravid, non-tender  Extremities: Non-tender with no edema bilaterally in LE    FHT: 130 moderate variability,  accels present, no decels   [place]: 0 contractions in ten minutes    Labs:   T&S, RPR, CBC    Assessment & Plan: 28 year old  at 37w0d here for RLTCS. Pregnancy is notable for:  Fetal pleural effusions  Breech presentation  Hx of CS x2  Hx of gHTN  Asthma    # Scheduled Repeat  Section  Indicated due to hx of CS x2. Will plan for a pfannenstiel skin incision with low transverse hysterotomy  - Labs: CBC, T&S, RPR   - Pre-op Hgb 10.8, Plts 176  - Placenta: anterior  - Anesthesia: Spinal  - 2g Ancef   - PPH Meds/Ppx: No hemabate  - Diet: NPO  - PPx:SCDs  - Consent: Discussed risks and benefits of procedure, including but not limited to bleeding, infection, injury to surrounding organs, injury to infant, and the potential need for another surgery should some injury go unrecognized or patient were to have continued bleeding. Patient had time to ask questions and expressed understanding of procedure and associated risks. Agreed to blood transfusion if necessary. Consent signed.     # PNC  - Rh pos, Rubella immune, GCT elevated, GBS neg  - Other prenatal labs wnl  - Imaging: Breech, EFW 27%ile     # FWB:   Cat 1 tracing, reactive  - Continuous Fetal Monitoring  - Intrauterine resuscitative measures prn    Patient discussed with Dr. Derek Calabrese MD  OBGYN PGY-2  2023 8:24 AM     Staff MD Note    I appreciate the note by Dr. Calabrese.  Any  necessary changes have been made by me.  I saw and evaluated the patient and agree with the findings and plan of care as documented in the note.    Radha Reed MD

## 2023-08-02 NOTE — ANESTHESIA POSTPROCEDURE EVALUATION
Patient: Paula Hagan    Procedure: Procedure(s):   SECTION       Anesthesia Type:  Spinal    Note:  Disposition: Inpatient   Postop Pain Control: Uneventful            Sign Out: Well controlled pain   PONV: No   Neuro/Psych: Uneventful            Sign Out: Acceptable/Baseline neuro status   Airway/Respiratory: Uneventful            Sign Out: Acceptable/Baseline resp. status   CV/Hemodynamics: Uneventful            Sign Out: Acceptable CV status; No obvious hypovolemia; No obvious fluid overload   Other NRE: NONE   DID A NON-ROUTINE EVENT OCCUR?            Last vitals:  Vitals Value Taken Time   /86 23 1215   Temp 36.8  C (98.3  F) 23 1215   Pulse 76 23 1215   Resp     SpO2 100 % 23 1216   Vitals shown include unvalidated device data.    Electronically Signed By: Lazaro Chen MD  2023  2:06 PM

## 2023-08-02 NOTE — OP NOTE
[facility]  Full Operative Progress Note     Surgery Date:  2023    Surgeon: Radha Reed MD    Assistants:   - Jyothi Calabrese MD PGY-2  - Zack Francis MS3    Pre-op Diagnosis:   - Intrauterine pregnancy at 37w0d  - Fetal bilateral pleural effusions  - Breech presentation  - Hx of CS x2  - Hx of gHTN  - Asthma    Post-op Diagnosis:   - Same, s/p procedure    Procedure:  Repeat low-transverse  section with single layer uterine closure via pfannenstiel incision    Anesthesia: Spinal    EBL: 366 mL    IVF: 1100 mL crystalloid    UOP: 150 mL clear urine at the end of the case    Drains: Sanford Catheter     Specimens: Umbilical cord, Cord gases    Complications: None apparent    Findings:   - Single, liveborn male infant at 0910 on 2023. No nuchal cord. Apgars 7 and 9 at one and five minutes. Birth weight: 2740 g.  Fetal presentation: Complete breech. Amniotic fluid: meconium stained.    - Two coiled shunts noted and removed  - Arterial Cord pH 7.36 with base excess -0.1.    - Placenta intact with 3 vessel cord.     - Normal appearing uterus, fallopian tubes, ovaries.   - Bladder adherent to lower uterine segment, bladder flap easily created.   - Dense recto-fascial adhesions. No intraabdominal adhesions. No abdominal wall adhesions.   - Good uterine tone.    Indications:   Paula Hagan is a 28 year old  at 37w0d admitted for RLTCS. Pregnancy notable for two prior  deliveries, breech presentation, bilateral fetal pleural effusions, s/p thoracenteses and placement of two shunts. The risks, benefits, and alternatives of  section were discussed with the patient, and she agreed to proceed.     Procedure Details:   The patient was brought to the OR, where adequate spinal anesthesia was administered.  She was placed in the dorsal supine position with a slight leftward tilt. She was prepped and draped in the usual sterile fashion. A surgical time out was  performed. A pfannenstiel skin incision was made with the scalpel, and carried down to the underlying fascia with sharp and blunt dissection. The fascia was incised in the midline, and the incision was extended laterally with the Willson scissors. The superior aspect of the fascia was grasped with the Kocher clamps and dissected off of the underlying rectus muscles with blunt and sharp dissection. Attention was then turned to the inferior aspect of the fascia, which was bluntly dissected off of the underlying rectus muscles. The rectus muscles were  in the midline, and the peritoneum was grasped with two Laurence clamps and incised with Metzenbaum scissors; the opening was then extended with digital pressure. The left aspect of the rectus was transected 2cm laterally to optimize the abdominal opening. The bladder blade was placed. The vesicouterine peritoneum was incised in the midline, and the incision was extended laterally with the Metzenbaum scissors. A bladder flap was created digitally and the bladder blade was replaced. A transverse hysterotomy was made with the scalpel in the lower uterine segment, and the incision was extended with digital pressure. The infant was noted to be in the complete breech position with sacrum transverse towards maternal right. The feet were swept through the incision and the fetus rotated to sacrum anterior.  Gentle traction delivered the baby to the scapulae. Arms were swept over the abdomen and delivered subsequently through the incision, followed by delivery of the head. The infant was noted to be stunned and the cord was quickly double clamped and cut, and handed off to waiting pediatric staff. The placenta was delivered with gentle traction on the umbilical cord and uterine massage. The uterus was exteriorized and cleared of all clots and debris. Both fetal shunts were removed. Uterine tone was noted to be firm with 30 units of pitocin given through the running IV and  uterine massage. The hysterotomy was closed with a running locked suture of 0 Vicryl. The was slight oozing noted in the midline of the hysterotomy, which was controlled with a single Figure-of-X suture using 0-Vicryl. The hysterotomy was then noted to be hemostatic. The posterior cul-de-sac was cleared of all clots and debris. The uterus was returned to the abdomen. The pericolic gutters were cleared of all clots and debris. The hysterotomy was reexamined and noted to be hemostatic. The fascia and rectus muscles were examined and areas of oozing were controlled with electrocautery. The fascia was closed with a running 0 Vicryl suture. The subcutaneous tissue was irrigated and areas of oozing were controlled with electrocautery. The subcutaneous tissue was less than 2 cm in thickness, and was therefore not closed. The subdermal layer was re-approximated in a running fashion using 3-0 Vicryl. The skin was closed with 4-0 Monocryl and covered with a sterile dressing.    All sponge, needle, and instrument counts were correct. The patient tolerated the procedure well, and was transferred to recovery in stable condition. Dr. Reed was present and scrubbed for the entirety of the procedure.       Jyothi Calabrese MD  OB/GYN Resident PGY-2  08/02/2023 10:20 AM     Staff MD Note  I was present and scrubbed for the entire procedure noted above.  I agree with the description above and any necessary changes have been made by me.  Radha Reed MD

## 2023-08-02 NOTE — ANESTHESIA PROCEDURE NOTES
"TAP Procedure Note    Pre-Procedure   Staff -        Anesthesiologist:  Lazaro Chen MD       Resident/Fellow: Mitch Parish MD       Performed By: resident       Location: OB       Procedure Start/Stop Times: 8/2/2023 10:10 AM and 8/2/2023 10:30 AM       Pre-Anesthestic Checklist: patient identified, IV checked, site marked, risks and benefits discussed, informed consent, monitors and equipment checked, pre-op evaluation, at physician/surgeon's request and post-op pain management  Timeout:       Correct Patient: Yes        Correct Procedure: Yes        Correct Site: Yes        Correct Position: Yes        Correct Laterality: Yes   Procedure Documentation  Procedure: TAP       Laterality: bilateral       Patient Position: supine       Skin prep: Chloraprep       Needle Type: short bevel       Needle Gauge: 21.        Needle Length (millimeters): 110        Ultrasound guided       1. Ultrasound was used to identify targeted nerve, plexus, vascular marker, or fascial plane and place a needle adjacent to it in real-time.       2. Ultrasound was used to visualize the spread of anesthetic in close proximity to the above referenced structure.       3. A permanent image is entered into the patient's record.       5. There were no apparent abnormal pathologic findings.    Assessment/Narrative         The placement was negative for: blood aspirated       Paresthesias: No.       Bolus given via needle..        Secured via.        Insertion/Infusion Method: Single Shot       Complications: none    Medication(s) Administered   Bupivacaine 0.25% PF (Infiltration) - Infiltration   20 mL - 8/2/2023 10:10:00 AM  Bupivacaine liposome (Exparel) 1.3% LA inj susp (Infiltration) - Infiltration   20 mL - 8/2/2023 10:10:00 AM  Medication Administration Time: 8/2/2023 10:10 AM      FOR [facility] ([facility]) ONLY:   Pain Team Contact information: please page the Pain Team Via [employer]. Search \"Pain\". During daytime hours, please page the " attending first. At night please page the resident first.

## 2023-08-02 NOTE — PLAN OF CARE
Pt arrived at 0615 to the hospital for a scheduled C/S. Oriented to triage room 48-02. EFM applied, see flow sheet. Patient stated she couldn't sleep all night due pregnancy. Afebrile. VSS. Oncoming SIMBA Mlahotra will finish prepping for C/S. No concerns at this time.

## 2023-08-02 NOTE — ANESTHESIA PROCEDURE NOTES
"Intrathecal injection Procedure Note    Pre-Procedure   Staff -        Anesthesiologist:  Lazaro Chen MD       Resident/Fellow: Mitch Parish MD       Other Anesthesia Staff: Marvin Islas MD       Performed By: resident       Location: OB       Pre-Anesthestic Checklist: patient identified, IV checked, risks and benefits discussed, informed consent, monitors and equipment checked, pre-op evaluation, at physician/surgeon's request and post-op pain management  Timeout:       Correct Patient: Yes        Correct Procedure: Yes        Correct Site: Yes        Correct Position: Yes   Procedure Documentation  Procedure: intrathecal injection       Patient Position: sitting       Patient Prep/Sterile Barriers: sterile gloves, mask, patient draped       Skin prep: Chloraprep       Insertion Site: L3-4. (midline approach).       Needle Gauge: 25.        Needle Length (Inches): 3.5        Spinal Needle Type: Pencan       Introducer used       Introducer: 20 G       # of attempts: 1 and  # of redirects:  0    Assessment/Narrative         Paresthesias: No.       Sensory Level: T4       CSF fluid: clear.    Medication(s) Administered   0.75% Hyperbaric Bupivacaine (Intrathecal) - Intrathecal   1.6 mL - 8/2/2023 8:38:00 AM  Fentanyl PF (Intrathecal) - Intrathecal   15 mcg - 8/2/2023 8:38:00 AM    FOR [facility] ([facility]) ONLY:   Pain Team Contact information: please page the Pain Team Via [employer]. Search \"Pain\". During daytime hours, please page the attending first. At night please page the resident first.      "

## 2023-08-02 NOTE — BRIEF OP NOTE
[facility]    Brief Operative Note    Pre-operative diagnosis: Fetal hydrops [P83.2]  History of 2  sections [Z98.891]  History of gHTN  History of IUFD [Z87.59]  Post-operative diagnosis Same as pre-operative diagnosis    Procedure: Procedure(s):   SECTION  Surgeon: Surgeon(s) and Role:     * Radha Reed MD - Primary     * Jyothi Calabrese MD - Resident - Assisting  Anesthesia: Spinal   Estimated Blood Loss: 366 mL  IVF: 1100 mL  UOP: 150 mL    Drains: Sanford    Specimens:   ID Type Source Tests Collected by Time Destination   A :  Tissue Umbilical Cord OR DOCUMENTATION ONLY Radha Reed MD 2023  9:10 AM    B :  Cord blood, arterial Umbilical Cord OR DOCUMENTATION ONLY Radha Reed MD 2023  9:10 AM    C :  Cord blood, venous Umbilical Cord OR DOCUMENTATION ONLY Radha Reed MD 2023  9:10 AM    D :  Cord blood Umbilical Cord OR DOCUMENTATION ONLY Radha Reed MD 2023  9:10 AM      Findings:    -Single, liveborn male infant at 0910 on 2023. No nuchal cord. Apgars pending. Birth weight: 2740 g.  Fetal presentation: Complete breech. Amniotic fluid: meconium stained.    - Two shunts removed  - Arterial Cord pH 7.36 with base excess -0.1.    -Placenta intact with 3 vessel cord.     -Normal appearing uterus, fallopian tubes, ovaries.   -Bladder adherent to lower uterine segment, bladder flap easily created.   - Dense recto-fascial adhesions. No intraabdominal adhesions. No abdominal wall adhesions.   -Good uterine tone.     Complications: None apparent    Implants: * No implants in log *    Jyothi Calabrese MD  Ob/Gyn Resident, PGY-2  2023 10:19 AM

## 2023-08-02 NOTE — PROVIDER NOTIFICATION
08/02/23 1546   Provider Notification   Provider Name/Title Dr. Calabrese   Method of Notification Electronic Page   Request Evaluate-Remote   Notification Reason Medication Request  (Pt not wanting to do narcotics but pain is strong and she wants to take 2.5 mg of Oxycodone.)

## 2023-08-02 NOTE — PLAN OF CARE
Goal Outcome Evaluation:  Data: Paula Hagan transferred to [id number] via cart at 1220. Baby in NICU. Breast pump done in PACU. Sanford with clear urine.   Action: Receiving unit notified of transfer: Yes. Patient and family notified of room change. Report given to Leatha COTTRELL. Belongings sent to receiving unit. Accompanied by Registered Nurse. Oriented patient to surroundings. Call light within reach.   Response: Patient tolerated transfer and is stable.

## 2023-08-02 NOTE — DISCHARGE SUMMARY
[facility] Discharge Summary    Paula Hagan MRN# [id number]   Age: 28 year old YOB: 1995     Date of Admission:  2023  Date of Discharge::  2023   Admitting Physician:  Radha Reed MD  Discharge Physician:  Nikki Vázquez MD             Admission Diagnoses:   - Intrauterine pregnancy at 37w0d  - Fetal bilateral pleural effusions  - Breech position  - Hx  section x2  - Hx Gestational hypertension  - Hx Gestational diabetes insipidus  - Uncomplicated asthma  - Hx Fetal demise            Discharge Diagnosis:   - Same, delivered   - Gestational Hypertension           Procedures:     Procedure(s): Repeat low transverse  section with single layer closure via Pfannenstiel skin incision                Medications Prior to Admission:     Medications Prior to Admission   Medication Sig Dispense Refill Last Dose    albuterol (PROAIR HFA/PROVENTIL HFA/VENTOLIN HFA) 108 (90 Base) MCG/ACT inhaler Inhale 1-2 puffs into the lungs every 4 hours as needed   More than a month    calcium carbonate (TUMS) 500 MG chewable tablet Take 1 chew tab by mouth 2 times daily   Past Week    Prenatal Vit-Fe Fumarate-FA (PRENATAL MULTIVITAMIN W/IRON) 27-0.8 MG tablet Take 1 tablet by mouth daily   2023    [DISCONTINUED] aspirin (ASA) 81 MG EC tablet Take 1 tablet (81 mg) by mouth daily   2023    [DISCONTINUED] metoclopramide (REGLAN) 10 MG tablet Take 1 tablet (10 mg) by mouth every 6 hours as needed (headache) 20 tablet 0 Past Week             Discharge Medications:        Review of your medicines        START taking        Dose / Directions   acetaminophen 325 MG tablet  Commonly known as: TYLENOL      Dose: 650 mg  Take 2 tablets (650 mg) by mouth every 6 hours as needed for mild pain Start after Delivery.  Quantity: 100 tablet  Refills: 0     ibuprofen 600 MG tablet  Commonly known as: ADVIL/MOTRIN      Dose: 600 mg  Take 1 tablet (600 mg) by mouth every 6 hours as needed for moderate  pain Start after delivery  Quantity: 60 tablet  Refills: 0     NIFEdipine ER 30 MG 24 hr tablet  Commonly known as: ADALAT CC      Dose: 30 mg  Take 1 tablet (30 mg) by mouth daily  Quantity: 30 tablet  Refills: 3     senna-docusate 8.6-50 MG tablet  Commonly known as: SENOKOT-S/PERICOLACE      Dose: 1 tablet  Take 1 tablet by mouth daily Start after delivery.  Quantity: 100 tablet  Refills: 0            CONTINUE these medicines which have NOT CHANGED        Dose / Directions   albuterol 108 (90 Base) MCG/ACT inhaler  Commonly known as: PROAIR HFA/PROVENTIL HFA/VENTOLIN HFA      Dose: 1-2 puff  Inhale 1-2 puffs into the lungs every 4 hours as needed  Refills: 0     calcium carbonate 500 MG chewable tablet  Commonly known as: TUMS      Dose: 1 chew tab  Take 1 chew tab by mouth 2 times daily  Refills: 0     prenatal multivitamin w/iron 27-0.8 MG tablet      Dose: 1 tablet  Take 1 tablet by mouth daily  Refills: 0            STOP taking      aspirin 81 MG EC tablet  Commonly known as: ASA        metoclopramide 10 MG tablet  Commonly known as: REGLAN                  Where to get your medicines        These medications were sent to [facility] [address]  [address] [address]      Phone: [phone]   acetaminophen 325 MG tablet  ibuprofen 600 MG tablet  NIFEdipine ER 30 MG 24 hr tablet  senna-docusate 8.6-50 MG tablet              Consultations:   - Anesthesia  - Lactation          Brief Admission History:   Ms. Paula Hagan is a 28 year old now  who initially presented at 37w0d for scheduled repeat low transverse . Her current pregnancy is notable for bilateral fetal pleural effusions s/p bilateral thoracenteses and shunt placement, now mild left sided effusion on US 23. Please see H&P for full details on her PMHx, PSHx, medications, and allergies at admission.         Intraoperative course   The procedure was uncomplicated.  EBL  366 mL.  See operative report for details.     Operative Findings:   - Single, liveborn male infant at 0910 on 08/02/2023. No nuchal cord. Apgars 7 and 9 at one and five minutes. Birth weight: 2740 g.  Fetal presentation: Complete breech. Amniotic fluid: meconium stained.    - Two coiled shunts noted and removed  - Arterial Cord pH 7.36 with base excess -0.1.    - Placenta intact with 3 vessel cord.     - Normal appearing uterus, fallopian tubes, ovaries.   - Bladder adherent to lower uterine segment, bladder flap easily created.   - Dense recto-fascial adhesions. No intraabdominal adhesions. No abdominal wall adhesions.   - Good uterine tone.       Postpartum Course   The patient's hospital course was notable for meeting criteria for gestational hypertension due to 2 blood pressures >4 hours apart. She was started on 30 mg of nifedipine. She recovered as anticipated and experienced no post-operative complications.  On discharge, her pain was well controlled. Vaginal bleeding is similar to peak menstrual flow.  Voiding without difficulty.  Ambulating well and tolerating a normal diet.  No fever or significant wound drainage.  Breastfeeding well.  Infant is stable.  No bowel movement yet.  She was discharged on post-partum day #2.    Post-partum hemoglobin:   Hemoglobin   Date Value Ref Range Status   08/03/2023 10.0 (L) 11.7 - 15.7 g/dL Final   02/20/2021 8.7 (L) 11.7 - 15.7 g/dL Final             Discharge Instructions and Follow-Up:     Discharge diet: Regular   Discharge activity: No lifting greater than 20 lbs, pushing, pulling, or other strenuous activity for 6 weeks. Pelvic rest for 6 weeks including no sexual intercourse, tampons, or douching. No driving until you can slam on the breaks without pain or while on narcotic pain medications.    Discharge follow-up: Follow up in 2 weeks for a BP check  Follow up with primary OB for routine postpartum visit in 6 weeks   Wound care: Keep incision clean and dry            Discharge Disposition:     Discharged to home     Amalia Puckett MD  OB/GYN PGY-2  8/4/2023 8:26 AM      I saw and evaluated this patient prior to discharge. I discussed the patient with the resident and agree with plan of care as documented in the resident note.   I personally reviewed vital signs, medications, labs.   I personally spent 10 minutes on discharge activities.  Nikki Vázquez MD

## 2023-08-03 LAB — HGB BLD-MCNC: 10 G/DL (ref 11.7–15.7)

## 2023-08-03 PROCEDURE — 36415 COLL VENOUS BLD VENIPUNCTURE: CPT

## 2023-08-03 PROCEDURE — 85018 HEMOGLOBIN: CPT

## 2023-08-03 PROCEDURE — 250N000013 HC RX MED GY IP 250 OP 250 PS 637

## 2023-08-03 PROCEDURE — 99232 SBSQ HOSP IP/OBS MODERATE 35: CPT | Mod: GC | Performed by: OBSTETRICS & GYNECOLOGY

## 2023-08-03 PROCEDURE — 120N000002 HC R&B MED SURG/OB UMMC

## 2023-08-03 PROCEDURE — 250N000011 HC RX IP 250 OP 636

## 2023-08-03 RX ADMIN — NIFEDIPINE 30 MG: 30 TABLET, FILM COATED, EXTENDED RELEASE ORAL at 08:06

## 2023-08-03 RX ADMIN — SENNOSIDES AND DOCUSATE SODIUM 2 TABLET: 50; 8.6 TABLET ORAL at 08:06

## 2023-08-03 RX ADMIN — Medication 5 MG: at 01:56

## 2023-08-03 RX ADMIN — PRENATAL VIT W/ FE FUMARATE-FA TAB 27-0.8 MG 1 TABLET: 27-0.8 TAB at 08:06

## 2023-08-03 RX ADMIN — ACETAMINOPHEN 975 MG: 325 TABLET, FILM COATED ORAL at 01:48

## 2023-08-03 RX ADMIN — ACETAMINOPHEN 975 MG: 325 TABLET, FILM COATED ORAL at 14:34

## 2023-08-03 RX ADMIN — IBUPROFEN 800 MG: 800 TABLET, FILM COATED ORAL at 17:34

## 2023-08-03 RX ADMIN — ACETAMINOPHEN 975 MG: 325 TABLET, FILM COATED ORAL at 08:06

## 2023-08-03 RX ADMIN — KETOROLAC TROMETHAMINE 30 MG: 30 INJECTION INTRAMUSCULAR; INTRAVENOUS at 04:57

## 2023-08-03 RX ADMIN — IBUPROFEN 800 MG: 800 TABLET, FILM COATED ORAL at 11:01

## 2023-08-03 RX ADMIN — IBUPROFEN 800 MG: 800 TABLET, FILM COATED ORAL at 23:46

## 2023-08-03 RX ADMIN — ACETAMINOPHEN 975 MG: 325 TABLET, FILM COATED ORAL at 20:36

## 2023-08-03 ASSESSMENT — ACTIVITIES OF DAILY LIVING (ADL)
ADLS_ACUITY_SCORE: 20
ADLS_ACUITY_SCORE: 24
ADLS_ACUITY_SCORE: 20
ADLS_ACUITY_SCORE: 24
ADLS_ACUITY_SCORE: 20
ADLS_ACUITY_SCORE: 24

## 2023-08-03 NOTE — PROGRESS NOTES
This psychosocial assessment note was copied from baby's chart    Social Work Initial Consult    DATA/ASSESSMENT    General Information  Assessment completed with: Parents, Paula, mom  Type of visit:        Reason for Consult: other (see comments) (NICU admission)    Living Environment:   Primary caregiver: mother, father  Lives with: mother, father, brother         Current living arrangements: house          Able to return to prior arrangements: yes     Family Factors  Family Risk Factors: distance from home, other children at home needing care and attention  Family Strength Factors: experienced parents, parental employment, stable housing    Assessment of Support  Parental Marital Status:   Who is your support system?: Grandparent(s) Description of Support System: Supportive  Support Assessment: Adequate family and caregiver support, Adequate social supports    Employment/Financial  Patient's caregiver works full/part time: Yes     Patient works full/part time: No       Coping/Stress  Major Change/Loss/Stressor: pregnancy   Patient Personal Strengths: future/goal oriented, positive attitude         Understanding of Condition and Treatment: partial understanding of medical condition, partial understanding of treatment     Additional Information:  This writer met with Paula and Milad at bedside.  They report Anthony is doing better today and may be extubated later.  Paula reports they became aware Anthony had hydrops around her 20th week of pregnancy.  This diagnosis was very scary for the family as the outcomes were so varied.  Family lives about 3.5 hours away.  They have two sons at home who are being cared for by grandparents and family.  Parents are interested in lodging options near the hospital.  They were amenable to a referral to [facility].  Paula will likely be discharged Saturday.  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  Paual denies concerns for  chemical or mental health.  Parents report a good support system in their home community but it is hard to be far from home right now.  This writer provided a [employer] parking pass as family may need to commute from home until a room at [facility] is available.     INTERVENTION    Conducted chart review and consulted with medical team regarding plan of care. Introduced SW role and scope of practice.     Provided assessment of patient and family's level of coping  Conducted psychosocial assessment   Validated emotions and provided supportive listening  Assessed coping and adjustment to new diagnosis, subsequent hospital admission and treatment  Provided internal resources (add link to row)    Provided SW contact info    PLAN    SW will continue to follow for supportive intervention.     Hetal BYRNESW, MSW, [facility]  Maternal Child Health   [phone]--office  [phone]--pager

## 2023-08-03 NOTE — PLAN OF CARE
VSS on RA, BP improved with Procardia. Up ad tj. Fundus firm at 1 cm below umbilicus. Lochia scant. Voided spontaneously after aguillon removal. Passing flatus, denies nausea. Incisional pain managed with Oxycodone, Tylenol and Toradol. Incision dressing c/d/i. Breast pumping done for infant in NICU. Continue plan of care.     Vitals:    08/02/23 1720 08/02/23 1938 08/02/23 2338 08/03/23 0406   BP: (!) 130/91 121/83 110/78 117/82   BP Location: Left arm      Patient Position: Semi-Enriquez's      Cuff Size: Adult Regular      Pulse: 79 81 73 80   Resp: 16  18 16   Temp: 98.2  F (36.8  C) 98  F (36.7  C) 98  F (36.7  C) 97.6  F (36.4  C)   TempSrc: Oral Oral Oral Oral   SpO2:  100%     Weight:    72.1 kg (158 lb 14.4 oz)

## 2023-08-03 NOTE — PROGRESS NOTES
Obstetrics Postpartum Progress Note  DOS: 08/02/23  MRN: [id number]    POD#1 after RLTCS     S: Patient states she is doing well.  Pain was significant yesterday but has improved after oxycodone overnight. Lochia heavier than menses yesterday, improved today.  Eating and drinking without nausea/vomiting.  She is passing flatus. Ambulating without difficulty.  Voiding without difficulty.  Denies headaches, vision changes, chest pain, SOB.  Breastfeeding. No other acute concerns. Desires Mirena for contraception.   O:  Vitals:    08/02/23 1720 08/02/23 1938 08/02/23 2338 08/03/23 0406   BP: (!) 130/91 121/83 110/78 117/82   BP Location: Left arm      Patient Position: Semi-Enriquez's      Cuff Size: Adult Regular      Pulse: 79 81 73 80   Resp: 16  18 16   Temp: 98.2  F (36.8  C) 98  F (36.7  C) 98  F (36.7  C) 97.6  F (36.4  C)   TempSrc: Oral Oral Oral Oral   SpO2:  100%     Weight:    72.1 kg (158 lb 14.4 oz)     Gen:  NAD, resting comfortably in bed   CV: Regular rate, well perfused  Resp: Nonlabored on room air, normal inspiratory effort  Abd: soft, nondistended, nontender, fundus firm at 1cm below umbilicus  Incision:  clean, dry, intact, dressing in place  Ext: trace LE edema    Urine output:     Intake/Output Summary (Last 24 hours) at 8/3/2023 0820  Last data filed at 8/3/2023 0700  Gross per 24 hour   Intake 2391.13 ml   Output 3416 ml   Net -1024.87 ml          Labs:  Hemoglobin   Date Value Ref Range Status   08/02/2023 10.4 (L) 11.7 - 15.7 g/dL Final   08/02/2023 10.8 (L) 11.7 - 15.7 g/dL Final   02/20/2021 8.7 (L) 11.7 - 15.7 g/dL Final   02/18/2021 10.5 (L) 11.7 - 15.7 g/dL Final     Recent Labs   Lab Test 08/02/23  1707 08/02/23  0733 08/01/23  1458 07/23/23  0948 07/20/23  1447   HGB 10.4* 10.8* 11.5* 10.9* 11.5*     --  176 147* 162   AST 20  --   --  16 18   ALT 9  --   --  11 11   CR 0.50*  --   --  0.67 0.66      Rubella Antibody IgG Quantitative   Date Value Ref Range Status   08/13/2020 9  IU/mL Final     Comment:     Equivocal, please recollect.  Reference Range:    Unvaccinated Negative 0-7 IU/mL  Vaccinated or previous exposure Positive 10 IU/ml or greater       Rubella Antibody IgG   Date Value Ref Range Status   2023 Positive  Final     Comment:     Suggests previous exposure or immunization and probable immunity.       Assessment and Plan: 28 year old  POD#1 s/p RLTCS, doing well postpartum. Pregnancy was complicated by h/o CS x1, asthma, gestational HTN.    Routine postpartum:  Heme: Hgb 11.5 >  > AM hgb pending. No symptoms of ABLA. Will discharge home w/ PO iron if Hgb under 10.  Pain: well-controlled with tylenol, ibuprofen and oxycodone prn, continue.  Rh positive/Rubella immune. No intervention required.  Feeding: breast  :  s/p aguillon, voiding spontaneously  PPX: Encourage ambulation  Continue regular diet, scheduled bowel regimen, prn antiemetics  Contraception: Plans Mirena at 6wks. Discussed adequate pregnancy spacing of 18 months    Gestational HTN   - Serial BP monitoring  - Blood pressures normal range overnight   - IV antihypertensives for sustained severe range blood pressures  - HELLP labs wnl   - D#2 nifedipine 30 mg     Dispo: Anticipate discharge home PPD#2-3    Amalia Puckett MD  OB/GYN PGY-2  8/3/2023 8:20 AM     I have seen and examined the patient without the resident. I have reviewed, edited, and agree with the note.   My findings are:  Hemoglobin   Date Value Ref Range Status   2023 10.0 (L) 11.7 - 15.7 g/dL Final   2021 8.7 (L) 11.7 - 15.7 g/dL Final   Acute blood loss anemia-- stable and asymptomatic  Sarah Berry MD

## 2023-08-03 NOTE — LACTATION NOTE
This note was copied from a baby's chart.  Lactation Follow Up Note    Reason for visit:  Finish admit teaching    Supply:  Pumped a puddle with me; also hand expressing    Equipment changes:  Requested 21mm flanges (which looked a little tight), she used 21mm with prior children    Latching:  N/a    Education given:  Reviewed all topics in NICU admit book:  [x]Admission packet  [x]Kangaroo care  [x]Benefits of breast milk  [x]How breast milk is made  [x]Stages of milk production  [x]Milk supply/ goal volumes  [x]Hand expression  [x]Hands-on pumping  [x]Collecting, labeling, transporting milk  [x]Cleaning, sanitizing pump parts  [x]Storage of milk    Plan:  Will let [facility] RN know if she'd like a rental [employer]  She is unsure if she will stay local or drive back and forth (she lives 4 hours away); we discussed logistics of pumping and storing when visiting infrequently.  Will check in on day 5 (phone or in person)    Surekha Lam, RNC-ANTON, IBCLC   Lactation Consultant  Ascom: [phone]  Office: [phone]

## 2023-08-04 VITALS
RESPIRATION RATE: 14 BRPM | TEMPERATURE: 98.4 F | BODY MASS INDEX: 26.45 KG/M2 | OXYGEN SATURATION: 100 % | DIASTOLIC BLOOD PRESSURE: 75 MMHG | WEIGHT: 154.1 LBS | SYSTOLIC BLOOD PRESSURE: 121 MMHG | HEART RATE: 88 BPM

## 2023-08-04 PROBLEM — O13.9 GESTATIONAL HYPERTENSION: Status: ACTIVE | Noted: 2023-08-04

## 2023-08-04 PROCEDURE — 99238 HOSP IP/OBS DSCHRG MGMT 30/<: CPT | Mod: GC | Performed by: OBSTETRICS & GYNECOLOGY

## 2023-08-04 PROCEDURE — 250N000013 HC RX MED GY IP 250 OP 250 PS 637

## 2023-08-04 RX ORDER — ACETAMINOPHEN 325 MG/1
650 TABLET ORAL EVERY 6 HOURS PRN
Qty: 100 TABLET | Refills: 0 | Status: SHIPPED | OUTPATIENT
Start: 2023-08-04

## 2023-08-04 RX ORDER — NIFEDIPINE 30 MG
30 TABLET, EXTENDED RELEASE ORAL DAILY
Qty: 30 TABLET | Refills: 3 | Status: SHIPPED | OUTPATIENT
Start: 2023-08-04 | End: 2023-08-21

## 2023-08-04 RX ORDER — IBUPROFEN 600 MG/1
600 TABLET, FILM COATED ORAL EVERY 6 HOURS PRN
Qty: 60 TABLET | Refills: 0 | Status: SHIPPED | OUTPATIENT
Start: 2023-08-04 | End: 2023-08-21

## 2023-08-04 RX ORDER — AMOXICILLIN 250 MG
1 CAPSULE ORAL DAILY
Qty: 100 TABLET | Refills: 0 | Status: SHIPPED | OUTPATIENT
Start: 2023-08-04 | End: 2023-08-21

## 2023-08-04 RX ORDER — OXYCODONE HYDROCHLORIDE 5 MG/1
2.5-5 TABLET ORAL EVERY 4 HOURS PRN
Qty: 8 TABLET | Refills: 0 | Status: SHIPPED | OUTPATIENT
Start: 2023-08-04 | End: 2023-08-21

## 2023-08-04 RX ADMIN — SENNOSIDES AND DOCUSATE SODIUM 1 TABLET: 50; 8.6 TABLET ORAL at 08:38

## 2023-08-04 RX ADMIN — IBUPROFEN 800 MG: 800 TABLET, FILM COATED ORAL at 05:51

## 2023-08-04 RX ADMIN — IBUPROFEN 800 MG: 800 TABLET, FILM COATED ORAL at 12:24

## 2023-08-04 RX ADMIN — ACETAMINOPHEN 975 MG: 325 TABLET, FILM COATED ORAL at 08:38

## 2023-08-04 RX ADMIN — ACETAMINOPHEN 975 MG: 325 TABLET, FILM COATED ORAL at 02:37

## 2023-08-04 RX ADMIN — NIFEDIPINE 30 MG: 30 TABLET, FILM COATED, EXTENDED RELEASE ORAL at 08:38

## 2023-08-04 RX ADMIN — PRENATAL VIT W/ FE FUMARATE-FA TAB 27-0.8 MG 1 TABLET: 27-0.8 TAB at 08:38

## 2023-08-04 ASSESSMENT — ACTIVITIES OF DAILY LIVING (ADL)
ADLS_ACUITY_SCORE: 20

## 2023-08-04 NOTE — PLAN OF CARE
VSS and postpartum assessments WDL.  Up ad tj with steady gait and independent with cares, voiding and stooled x1.  Pumping independently, visited NICU for several hours over shift. Pain managed with tylenol, ibuprofen, has lidocaine patch available does not need at this time.  , Milad, present and supportive.  Will continue with postpartum cares and education per plan of care.

## 2023-08-04 NOTE — DISCHARGE INSTRUCTIONS
Postop  Birth Instructions    Activity     Do not lift more than 10 pounds for 6 weeks after surgery.  Ask family and friends for help when you need it.  No driving until you have stopped taking your pain medications (usually two weeks after surgery).  No heavy exercise or activity for 6 weeks.  Don't do anything that will put a strain on your surgery site.  Don't strain when using the toilet.  Your care team may prescribe a stool softener if you have problems with your bowel movements.     To care for your incision:     Keep the incision clean and dry.  Do not soak your incision in water. No swimming or hot tubs until it has fully healed. You may soak in the bathtub if the water level is below your incision.  Do not use peroxide, gel, cream, lotion, or ointment on your incision.  Adjust your clothes to avoid pressure on your surgery site (check the elastic in your underwear for example).     You may see a small amount of clear or pink drainage and this is normal.  Check with your health care provider:     If the drainage increases or has an odor.  If the incision reddens, you have swelling, or develop a rash.  If you have increased pain and the medicine we prescribed doesn't help.  If you have a fever above 100.4 F (38 C) with or without chills when placing thermometer under your tongue.   The area around your incision (surgery wound), will feel numb.  This is normal. The numbness should go away in less than a year.     Keep your hands clean:  Always wash your hands before touching your incision (surgery wound). This helps reduce your risk of infection. If your hands aren't dirty, you may use an alcohol hand-rub to clean your hands. Keep your nails clean and short.    Call your healthcare provider if you have any of these symptoms:     You soak a sanitary pad with blood within 1 hour, or you see blood clots larger than a golf ball.  Bleeding that lasts more than 6 weeks.  Vaginal discharge that smells  bad.  Severe pain, cramping or tenderness in your lower belly area.  A need to urinate more frequently (use the toilet more often), more urgently (use the toilet very quickly), or it burns when you urinate.  Nausea and vomiting.  Redness, swelling or pain around a vein in your leg.  Problems breastfeeding or a red or painful area on your breast.  Chest pain and cough or are gasping for air.  Problems with coping with sadness, anxiety or depression. If you have concerns about hurting yourself or the baby, call your provider immediately.    You have questions or concerns after you return home.                  Warning Signs after Having a Baby    Keep this paper on your fridge or somewhere else where you can see it.    Call your provider if you have any of these symptoms up to 12 weeks after having your baby.    Thoughts of hurting yourself or your baby  Pain in your chest or trouble breathing  Severe headache not helped by pain medicine  Eyesight concerns (blurry vision, seeing spots or flashes of light, other changes to eyesight)  Fainting, shaking or other signs of a seizure    Call 9-1-1 if you feel that it is an emergency.     The symptoms below can happen to anyone after giving birth. They can be very serious. Call your provider if you have any of these warning signs.    My provider s phone number: _______________________    Losing too much blood (hemorrhage)    Call your provider if you soak through a pad in less than an hour or pass blood clots bigger than a golf ball. These may be signs that you are bleeding too much.    Blood clots in the legs or lungs    After you give birth, your body naturally clots its blood to help prevent blood loss. Sometimes this increased clotting can happen in other areas of the body, like the legs or lungs. This can block your blood flow and be very dangerous.     Call your provider if you:  Have a red, swollen spot on the back of your leg that is warm or painful when you touch  it.   Are coughing up blood.     Infection    Call your provider if you have any of these symptoms:  Fever of 100.4 F (38 C) or higher.  Pain or redness around your stitches if you had an incision.   Any yellow, white, or green fluid coming from places where you had stitches or surgery.    Mood Problems (postpartum depression)    Many people feel sad or have mood changes after having a baby. But for some people, these mood swings are worse.     Call your provider right away if you feel so anxious or nervous that you can't care for yourself or your baby.    Preeclampsia (high blood pressure)    Even if you didn't have high blood pressure when you were pregnant, you are at risk for the high blood pressure disease called preeclampsia. This risk can last up to 12 weeks after giving birth.     Call your provider if you have:   Pain on your right side under your rib cage  Sudden swelling in the hands and face    Remember: You know your body. If something doesn't feel right, get medical help.     For informational purposes only. Not to replace the advice of your health care provider. Copyright 2020 [facility]. All rights reserved. Clinically reviewed by Elenita Holder, RNC-OB, MSN. [employer] [id number] - Rev 02/23.

## 2023-08-04 NOTE — PROGRESS NOTES
Obstetrics Postpartum Progress Note  DOS: 08/02/23  MRN: [id number]    POD#2 after RLTCS     S: Patient states she is doing well. Pain is well controlled. Lochia miimal.  Eating and drinking without nausea/vomiting.  She is passing flatus. Ambulating without difficulty.  Voiding without difficulty.  Breastfeeding. No other acute concerns. Desires Mirena for contraception.   O:  Vitals:    08/03/23 0406 08/03/23 0806 08/03/23 1830 08/04/23 0238   BP: 117/82 122/83 115/81 114/69   BP Location:  Left arm Left arm Left arm   Patient Position:  Semi-Enriquez's Semi-Enriquez's Semi-Enriquez's   Cuff Size:  Adult Regular Adult Regular Adult Regular   Pulse: 80 98 99 90   Resp: 16 16 16 16   Temp: 97.6  F (36.4  C) 97.7  F (36.5  C) 98  F (36.7  C) 98  F (36.7  C)   TempSrc: Oral Oral Oral Oral   SpO2:       Weight: 72.1 kg (158 lb 14.4 oz)        Gen:  NAD, resting comfortably in bed   CV: Regular rate, well perfused  Resp: Nonlabored on room air, normal inspiratory effort  Abd: soft, nondistended, nontender, fundus firm at 2cm below umbilicus  Incision:  clean, dry, intact  Ext: trace LE edema    Labs:  Hemoglobin   Date Value Ref Range Status   08/03/2023 10.0 (L) 11.7 - 15.7 g/dL Final   08/02/2023 10.4 (L) 11.7 - 15.7 g/dL Final   02/20/2021 8.7 (L) 11.7 - 15.7 g/dL Final   02/18/2021 10.5 (L) 11.7 - 15.7 g/dL Final     Recent Labs   Lab Test 08/03/23  0754 08/02/23  1707 08/02/23  0733 08/01/23  1458 07/23/23  0948 07/20/23  1447   HGB 10.0* 10.4* 10.8* 11.5* 10.9* 11.5*   PLT  --  158  --  176 147* 162   AST  --  20  --   --  16 18   ALT  --  9  --   --  11 11   CR  --  0.50*  --   --  0.67 0.66      Rubella Antibody IgG Quantitative   Date Value Ref Range Status   08/13/2020 9 IU/mL Final     Comment:     Equivocal, please recollect.  Reference Range:    Unvaccinated Negative 0-7 IU/mL  Vaccinated or previous exposure Positive 10 IU/ml or greater       Rubella Antibody IgG   Date Value Ref Range Status   01/31/2023  Positive  Final     Comment:     Suggests previous exposure or immunization and probable immunity.       Assessment and Plan: 28 year old  POD#2 s/p RLTCS, doing well postpartum. Pregnancy was complicated by h/o CS x1, asthma, gestational HTN.    Routine postpartum:  Heme: Hgb 11.5 >  > AM hgb pending. No symptoms of ABLA. Will discharge home w/ PO iron if Hgb under 10.  Pain: well-controlled with tylenol, ibuprofen and oxycodone prn, continue.  Rh positive/Rubella immune. No intervention required.  Feeding: breast  :  s/p aguillon, voiding spontaneously  PPX: Encourage ambulation  Continue regular diet, scheduled bowel regimen, prn antiemetics  Contraception: Plans Mirena at 6wks. Discussed adequate pregnancy spacing of 18 months    Gestational HTN   - Serial BP monitoring  - Blood pressures normal range overnight   - IV antihypertensives for sustained severe range blood pressures  - HELLP labs wnl   - D#2 nifedipine 30 mg     Dispo: Anticipate discharge home today    Amalia Puckett MD  OB/GYN PGY-2  2023 8:23 AM     Appreciate Dr. Puckett's note above, patient also seen and examined by me. I agree with the note above.   Nikki Vázquez MD

## 2023-08-04 NOTE — PLAN OF CARE
Assessment complete and WDL. VSS. Pt up ad tj, voiding WDL, passing gas. Pt taking tylenol and ibuprofen for pain. Pt has not needed oxycodone this shift. Lido patches removed this AM. Pt using breast pump and sending to NICU.     Continue POC.

## 2023-08-04 NOTE — PLAN OF CARE
Goal Outcome Evaluation:       VSS. Blood pressure stable. Pt has home blood pressure cuff. Postpartum assessment WNL. C/o incisional pain well managed with ibuprofen and tylenol. Pumping independently. Pt states she has a breast pump at home, declined rental pump at this time. Reviewed AVS with pt. Pt to discharge after visit with baby in NICU.

## 2023-08-11 ENCOUNTER — LACTATION ENCOUNTER (OUTPATIENT)
Age: 28
End: 2023-08-11

## 2023-08-11 NOTE — LACTATION NOTE
This note was copied from a baby's chart.  Lactation Follow Up Note    Reason for visit:  Breastfeeding support; parents with questions about how to manage timing of feedings and supplementation. Feel like their feedings are lasting well over an hour and then it is time to feed again.   Parents are concerned because baby lost weight since yesterday. Mother reports she has been using the [employer] to collect let down while feeding on the opposite side. For the last 24 hours supplements have been exclusively freshly expressed milk from [employer]. Parents concerned that this milk is primarily foremilk and does not contain enough fat to support weight. Plan to use refrigerated EBM for supplementation until next weight check.   Encouraged limiting breastfeeding to 15-20 minutes and then moving onto supplementation and pumping if needed. This will keep feeding cares condensed and allow time for baby and parents to rest between feedings.     Supply:  Mother reports having lots of milk in the NICU and at home. She is pumping two times per day; once in the morning and once before bed and breastfeeding baby in the hospital.     Equipment changes:  na    Latching:  Mother is breastfeeding at time of consult using NS, latch appears deep and mother reports it is comfortable.     Education given:  Breast milk production, feeding plan, fore/hind milk    Plan:  Continue with current feeding plan, use previously expressed breast milk to supplement vs [employer] EBM.     Marta Moreira RN, IBCLC   Lactation Consultant  Ascom: [phone]  Office: [phone]

## 2023-08-15 ENCOUNTER — LACTATION ENCOUNTER (OUTPATIENT)
Age: 28
End: 2023-08-15

## 2023-08-15 NOTE — LACTATION NOTE
"This note was copied from a baby's chart.  LACTATION DISCHARGE INSTRUCTIONS      Congratulations on your approaching discharge day!  Our goal is to help you have all the information, skills and equipment you need to help you meet your lactation goals at home.        CDC HANDOUT ON STORING AND PREPARING HUMAN MILK AT HOME    Please see attached handout   https://www.cdc.gov/breastfeeding/recommendations/handling_breastmilk.htm      FEEDING LOG: BABY'S FIRST WEEK, SECOND WEEK AND BEYOND    Please see attached feeding logs  Goal is to eat at least 8 times in 24 hours  Goal is to have at least 6 wet diapers in 24 hours  Talk to your provider about goal for soiled diapers.  Each baby is different depending on age and what they are eating      OTHER DISCHARGE INFORMATION    Medications:   Some women may find certain types of hormonal birth control, decongestants or antihistamines may impact supply-- talk to your provider.  Always get a second opinion from a lactation consultant or a provider familiar with lactation if told to stop latching or \"pump and dump\" when starting a new medication, having a procedure or you are ill; most of the time things are compatible.      TRANSITIONING TO MORE FEEDINGS AT HOME    Often, babies go home from the NICU doing a combination of breastfeeding and bottle feeding.  With time and patience, most will go on to nurse most or all their feedings.  infants, in particular, may not be able to fully nurse until at or after their due date. To ensure your baby is taking adequate volumes, some babies may need supplemental bottle after breastfeeding. Keep these things in mind as you nurse your baby at home:    Good time management is key!  Make feedings efficient so you have time to eat, sleep, and pump.    It is important to latch your baby frequently, even if he or she is taking small amounts. Staying skin to skin will also help keep your baby \"breast oriented\".  Going days without latching " will make it more difficult.  Babies can be re-taught how to latch, but this is very time consuming and not always successful.        Please see a lactation consultant ASAP if you are not meeting your latching goal.  It is easier to make changes now, versus weeks or months down the road.      HOW TO WEAN FROM THE NIPPLE SHIELD    Many NICU babies use a nipple shield for a period of time, especially premature babies and babies recovering from illness or surgery.  It helps them stay latched on and get milk more easily.    How do you know it's time to try off the nipple shield?    Your baby is waking on their own before feedings  Your baby is able to stay awake during the entire feeding, without a lot of encouragement to stay awake  Your baby's suck is significantly stronger   Your baby is taking full feedings at the breast  Typically, at or after their due date    How do I wean off the nipple shield?    Start the feeding with the nipple shield in place, then take the nipple shield off group home through the feeding and re-latch  Try at feedings where your baby is calm, not when they are frantically hungry  Middle of the night can be a good time to try, when everyone is relaxed  https://www.[employer].Wedit/blog/my-ten-step-process-for-weaning-from-the-nipple-shield/    How do I know my baby is eating well without the nipple shield?    They seem satisfied after feeding  Your breasts feels softer after the feeding  Your baby has enough wet and soiled diapers  If using a breastfeeding scale-- the numbers on the scale are similar to a feeding with a nipple shield  If you have problems getting off the nipple shield, please make an appointment with a lactation consultant.      HOW TO WEAN FROM THE PUMP (AFTER YOUR BABY TAKES A FULL BREASTFEEDING)    Your milk supply may be greater than what your baby needs after discharge. It is important that you gradually wean from pumping after your baby takes a full breastfeeding  "(without needing a top-off).  If you wean too quickly, you will be uncomfortable and you run the risk of causing your supply to drop.    If you have been pumping less than two weeks:    If you are uncomfortable after a full breastfeeding, pump only until you are comfortable (versus pumping until empty)      If you have been pumping two weeks or more:    Continue to pump after every breastfeeding, but gradually decrease the time or volume you pump.   Example based on time: If you have been pumping 20 minutes after each full breastfeeding, decrease to 18 minutes for two days. If still comfortable, decrease to 16 minutes for another two days.   Example based on volume: If you normally pump 2 oz after a feeding, pump 1.75 oz for a few days, 1.5 oz for a few days, etc  Continue this way until you no longer need to pump (after breastfeeding).    Remember that if you are bottle feeding some feedings, you need to pump at the time you would have latched your baby. If you do not, you might start decreasing your milk supply.        OTHER LATCHING INFORMATION    Growth Spurts: Common times for \"growth spurts\" are around 7-10 days, 2-3 weeks, 4-6 weeks, 3 months, 4 months, 6 months and 9 months, but these vary widely between babies.  During these times allow your baby to nurse very frequently (or pump more frequently) to temporarily boost your supply, as opposed to supplementing.  It should pass in a few days when your supply increases, and your baby will settle into a new feeding pattern.    How to get a breastfeeding test weight scale:   Rental (2ml sensitivity):   [facility] ([facility]) [phone]   [facility] ([facility]) [phone]  [place] [employer]) [phone]   Purchase scale (6ml sensitivity):   \"[place] Baby Scale\" (No.1 Traveller, Amazon, etc), around $150      LACTATION SUPPORT    [place] Lactation Resources:   VICK Yeboah, CNM, IBCLC  Tuesday:  [facility],  8:30 - 5:00, " " [phone]  Wednesday:  [facility], 7th floor, 8:30 - 4:00, [phone]  Thursday:  [facility], [facility], 8:30 - 4:00,  [phone]    Breastfeeding Connection at [facility]  [phone]   Breastfeeding Connection at [facility]   [phone]  [facility] Lactation Services    [phone]  [facility]      [phone]  [facility]      [phone]  [facility]      [phone]    [facility]       [phone]  [facility]       [phone]      Other Lactation Help:  Francesca Parenting N[facility]ma/ [place] (Tues/Wed)     [phone]  [place] Baby Weigh In (various times and locations)    www.[employer] ++HAS VIRTUAL SUPPORT++   Enlightened Mama   www.Multispectral Imaging [phone]  Everyday Miracles         https://www.everyday-miracles.org/  [facility]     [phone] ++HAS VIRTUAL SUPPORT++   Christi Husain DO, MPH, [facility], IBCLC  Integrative Family Medicine Physician/Breastfeeding Medicine/ Home visits  www.[employer]antech  [phone]  [facility] \"Well Fed\" postpartum group ([facility])   [phone]  Support in other languages:  Dominican:  Abbie (IBCLC/ Dominican) [phone]  [email or web address].  La Leche League: Si quieres ayuda en espanol con brianna pecho por favor llama Erica al [phone].  [facility] & [place]  For more information call [facility] [phone] or Dorothy at [phone] ([place]) or Susan Schultz at [phone] ([place]).  [place]: Fridays, 10:30 am to noon. [facility] [place] [address]: Wednesdays, 1:00-2:00 PM. [facility], [address] [place] [address]  Gladys (Hmong) [phone]  Homero ([place]) [phone]   breastfeeding support:  [facility] " "([place])     www.rootsbirthThe Palisades GrouperQik  [phone]  Chocolate Milk Club:    http://www.Dealer TireselsmidwiferCordium Links.com/chocolate-milk-club/  Dr. Karie Lindquist, [phone]  DIVA Moms (Dynamic Involved Valued  Moms)   [phone]  Conjecta  [phone] or [email or web address]  https://www.Rock Content.com/Blackfamiliesdobreastfeed  [place]mong Breastfeeding Coalition:  See Facebook site  [email or web address].ReaLync Alissa Rea Sapp [phone]  [place] Indigenous Breastfeeding [place]      See Facebook site or Google \"Zuleika Gracegaby\"  [email or web address].[email or web address]  [place] [phone]   Tomeka López, [email or web address] and [facility] Breastfeeding Coalition    For information, contact Latonya Almonte at [phone], [email or web address].[email or web address] Lactation Support  [facility], Pediatrics & Adolescent Medicine: [phone], ext. [phone]. Outpatient appointments, phone assist   [facility], [phone]. Outpatient appointments, phone assist   [facility], [phone]. Inpatient services, outpatient appointments, phone assist   [facility], Inpatient services only   [facility], [phone]. Inpatient services, outpatient appointments, phone assist, 24-hour availability   [facility], [phone]. Inpatient services, outpatient appointments, phone assist   [facility], [phone], ext. [phone]. Inpatient services, phone assist -- Hours: 7 a.m. to 3:30 p.m. every day. After hours: Messages will be returned within 24 hours.    Telephone and Online Support    WIC ++HAS VIRTUAL SUPPORT++ (call for eligibility information)   [phone]    BabyCafes (www.babycafeusa.org) (now in person)    La Leche League International   ++HAS VIRTUAL SUPPORT++  www.llli.org  8-425-0-LA-LECHE ([phone])  Local referral line [phone]  Si " samra awaduda en espanol con brianna pecho por favor aman gandhi [phone].    LocalBanya-- up to date lactation information  Www.[employer]iQuest [employer]rapeutics.CHAINels    International Breastfeeding Trinity (Atilio Vargas)  Http://ibconline.ca/    The InfantRisk Call Center is available to answer questions about the use of medications during pregnancy and while breastfeeding  [phone]  www.[employer].CHAINels     Office on Women's Health National Breastfeeding Help Line  8am to 5pm, English and Vincentian [phone] option 1    https://www.womenshealth.gov/breastfeeding/ Zamb2Xrnt Leigh (free on Brightstar leigh store or Google Play)    LactMed Leigh (free on Brightstar leigh store or Google Play) LactMed is available online at https://toxnet.nlm.nih.gov/newtoxnet/lactmed.htm and is now available on your mobile device. The free LactMed Leigh for iPhone/iPComparaOnline Touch and Android can be downloaded at http://toxnet.nlm.nih.gov/help/lactmedapp.htm.

## 2023-08-15 NOTE — LACTATION NOTE
This note was copied from a baby's chart.  I met with mom for discharge teaching (see prior note for topics) and gave her pertinent handouts.  Teaching completed, all questions answered and she verbalizes readiness to go home.  Mom content with per pumping regime and oversupply.  I gave her extra nipple shields.  She has all the pumps at home she needs.  Encouraged seeking outpatient support as needed.

## 2023-08-21 ENCOUNTER — OFFICE VISIT (OUTPATIENT)
Dept: OBGYN | Facility: OTHER | Age: 28
End: 2023-08-21
Attending: OBSTETRICS & GYNECOLOGY
Payer: COMMERCIAL

## 2023-08-21 VITALS
DIASTOLIC BLOOD PRESSURE: 70 MMHG | HEART RATE: 60 BPM | TEMPERATURE: 97.7 F | BODY MASS INDEX: 24.89 KG/M2 | WEIGHT: 145 LBS | SYSTOLIC BLOOD PRESSURE: 122 MMHG

## 2023-08-21 DIAGNOSIS — Z98.891 S/P CESAREAN SECTION: ICD-10-CM

## 2023-08-21 DIAGNOSIS — Z98.890 POSTOPERATIVE STATE: Primary | ICD-10-CM

## 2023-08-21 PROCEDURE — 99213 OFFICE O/P EST LOW 20 MIN: CPT | Performed by: OBSTETRICS & GYNECOLOGY

## 2023-08-21 ASSESSMENT — PAIN SCALES - GENERAL: PAINLEVEL: NO PAIN (0)

## 2023-08-21 NOTE — NURSING NOTE
"Chief Complaint   Patient presents with    Surgical Followup     3 week follow up    Patient Is here for 3 week follow up c section and doing well with no concerns.     Initial /70   Pulse 60   Temp 97.7  F (36.5  C)   Wt 65.8 kg (145 lb)   LMP 11/16/2022 (Approximate)   BMI 24.89 kg/m   Estimated body mass index is 24.89 kg/m  as calculated from the following:    Height as of 12/15/22: 1.626 m (5' 4\").    Weight as of this encounter: 65.8 kg (145 lb).  Medication Reconciliation: complete        Sangeeta Nova LPN    "

## 2023-08-21 NOTE — PROGRESS NOTES
Paula Hagan presents todayfor a postop checkup at 3 week(s) after repeat  at [facility]    S: Bowels and bladder are functioning normally, no abnormal bleeding or pain. No concerns about the incision(s).    Current Outpatient Medications   Medication    albuterol (PROAIR HFA/PROVENTIL HFA/VENTOLIN HFA) 108 (90 Base) MCG/ACT inhaler    Prenatal Vit-Fe Fumarate-FA (PRENATAL MULTIVITAMIN W/IRON) 27-0.8 MG tablet    acetaminophen (TYLENOL) 325 MG tablet     No current facility-administered medications for this visit.     Allergies   Allergen Reactions    Bee Venom Anaphylaxis    Morphine Itching and Hives     Tolerates HYDROmorphone (DILUDID)  Tolerates HYDROmorphone (DILUDID)    Buspirone Other (See Comments)     Headache, migraine  migraines    Sertraline Muscle Pain (Myalgia)     Past Medical History:   Diagnosis Date    History of diabetes insipidus     in first pregnancy    History of gestational hypertension     Uncomplicated asthma     Varicella      Past Surgical History:   Procedure Laterality Date    APPENDECTOMY       SECTION  2018     SECTION N/A 2021    Procedure:  SECTION;  Surgeon: Seth Morfin MD;  Location: [facility] OR     SECTION N/A 2023    Procedure:  SECTION;  Surgeon: Radha Reed MD;  Location: [facility] L+[facility]    INSERT FETAL THORACOAMNIOTIC SHUNT N/A 2023    Procedure: INSERTION, SHUNT, THORACOAMNIOTIC  Plan for thoracentesis only 23. Fluid will be sent to lab. No shunt placement per MD.;  Surgeon: Abrahan Santos MD;  Location: [facility] L[facility]    INSERT FETAL THORACOAMNIOTIC SHUNT Bilateral 2023    Procedure: INSERTION, SHUNT, THORACOAMNIOTIC BILATERAL;  Surgeon: Abrahan Santos MD;  Location: [facility]       COMPLETE REVIEW OF SYSTEMS: Neg except as above    O: /70   Pulse 60   Temp 97.7  F (36.5  C)   Wt 65.8 kg (145 lb)   LMP 2022 (Approximate)   BMI 24.89 kg/m   Body mass index is 24.89  kg/m .    EXAM:  Abdomen: incision CDI, NT, ND      I/P:  Stable postop    Recheck at 2 months for IUD insertion  Restrictions reiterated    Seth Morfin MD FACOG  3:24 PM 8/21/2023

## 2023-10-05 ENCOUNTER — PRENATAL OFFICE VISIT (OUTPATIENT)
Dept: OBGYN | Facility: OTHER | Age: 28
End: 2023-10-05
Attending: OBSTETRICS & GYNECOLOGY
Payer: COMMERCIAL

## 2023-10-05 VITALS — BODY MASS INDEX: 24.72 KG/M2 | WEIGHT: 144 LBS

## 2023-10-05 DIAGNOSIS — Z30.09 BIRTH CONTROL COUNSELING: Primary | ICD-10-CM

## 2023-10-05 PROCEDURE — 99213 OFFICE O/P EST LOW 20 MIN: CPT | Performed by: OBSTETRICS & GYNECOLOGY

## 2023-10-05 RX ORDER — ACETAMINOPHEN AND CODEINE PHOSPHATE 120; 12 MG/5ML; MG/5ML
0.35 SOLUTION ORAL DAILY
Qty: 84 TABLET | Refills: 3 | Status: SHIPPED | OUTPATIENT
Start: 2023-10-05

## 2023-10-05 ASSESSMENT — EDINBURGH POSTNATAL DEPRESSION SCALE (EPDS)
I HAVE LOOKED FORWARD WITH ENJOYMENT TO THINGS: AS MUCH AS I EVER DID
TOTAL SCORE: 5
THINGS HAVE BEEN GETTING ON TOP OF ME: NO, I HAVE BEEN COPING AS WELL AS EVER
THE THOUGHT OF HARMING MYSELF HAS OCCURRED TO ME: NEVER
I HAVE BLAMED MYSELF UNNECESSARILY WHEN THINGS WENT WRONG: NOT VERY OFTEN
I HAVE FELT SAD OR MISERABLE: NO, NOT AT ALL
I HAVE FELT SCARED OR PANICKY FOR NO GOOD REASON: YES, SOMETIMES
I HAVE BEEN SO UNHAPPY THAT I HAVE HAD DIFFICULTY SLEEPING: NOT AT ALL
I HAVE BEEN SO UNHAPPY THAT I HAVE BEEN CRYING: NO, NEVER
I HAVE BEEN ABLE TO LAUGH AND SEE THE FUNNY SIDE OF THINGS: AS MUCH AS I ALWAYS COULD
I HAVE BEEN ANXIOUS OR WORRIED FOR NO GOOD REASON: YES, SOMETIMES

## 2023-10-05 ASSESSMENT — ANXIETY QUESTIONNAIRES
1. FEELING NERVOUS, ANXIOUS, OR ON EDGE: SEVERAL DAYS
GAD7 TOTAL SCORE: 4
6. BECOMING EASILY ANNOYED OR IRRITABLE: NOT AT ALL
3. WORRYING TOO MUCH ABOUT DIFFERENT THINGS: SEVERAL DAYS
2. NOT BEING ABLE TO STOP OR CONTROL WORRYING: SEVERAL DAYS
IF YOU CHECKED OFF ANY PROBLEMS ON THIS QUESTIONNAIRE, HOW DIFFICULT HAVE THESE PROBLEMS MADE IT FOR YOU TO DO YOUR WORK, TAKE CARE OF THINGS AT HOME, OR GET ALONG WITH OTHER PEOPLE: NOT DIFFICULT AT ALL
4. TROUBLE RELAXING: NOT AT ALL
5. BEING SO RESTLESS THAT IT IS HARD TO SIT STILL: NOT AT ALL
GAD7 TOTAL SCORE: 4
7. FEELING AFRAID AS IF SOMETHING AWFUL MIGHT HAPPEN: SEVERAL DAYS

## 2023-10-05 NOTE — NURSING NOTE
"Chief Complaint   Patient presents with    Postpartum Care     6 week post partum     Patient is here for a 6 week post partum. Patient is breastfeeding, has not had a period and would like to talk about birth control.   Initial Wt 65.3 kg (144 lb)   LMP 11/16/2022 (Approximate)   Breastfeeding Yes   BMI 24.72 kg/m   Estimated body mass index is 24.72 kg/m  as calculated from the following:    Height as of 12/15/22: 1.626 m (5' 4\").    Weight as of this encounter: 65.3 kg (144 lb).  Medication Reconciliation: complete        Sangeeta Nova LPN    "

## 2023-10-05 NOTE — PROGRESS NOTES
CC: postpartum exam at 6 weeks from delivery    HPI: Paula Hagan presents for postpartum exam. Baby was born by repeat  delivery. Complications included none.  Mood:OK    Breastfeeding: yes  Incision(s): no concern  Bleeding:  no  Birthcontrol:micronor    Past Medical History:   Diagnosis Date    History of diabetes insipidus     in first pregnancy    History of gestational hypertension     Uncomplicated asthma     Varicella      Past Surgical History:   Procedure Laterality Date    APPENDECTOMY       SECTION  2018     SECTION N/A 2021    Procedure:  SECTION;  Surgeon: Seth Morfin MD;  Location: [facility]     SECTION N/A 2023    Procedure:  SECTION;  Surgeon: Radha Reed MD;  [facility]    INSERT FETAL THORACOAMNIOTIC SHUNT N/A 2023    Procedure: INSERTION, SHUNT, THORACOAMNIOTIC  Plan for thoracentesis only 23. Fluid will be sent to lab. No shunt placement per MD.;  Surgeon: Abrahan Santos MD;  [facility]    INSERT FETAL THORACOAMNIOTIC SHUNT Bilateral 2023    Procedure: INSERTION, SHUNT, THORACOAMNIOTIC BILATERAL;  Surgeon: Abrahan Santos MD;  Location[facility]     Current Outpatient Medications   Medication    albuterol (PROAIR HFA/PROVENTIL HFA/VENTOLIN HFA) 108 (90 Base) MCG/ACT inhaler    Prenatal Vit-Fe Fumarate-FA (PRENATAL MULTIVITAMIN W/IRON) 27-0.8 MG tablet    acetaminophen (TYLENOL) 325 MG tablet     No current facility-administered medications for this visit.      Allergies   Allergen Reactions    Bee Venom Anaphylaxis    Morphine Itching and Hives     Tolerates HYDROmorphone (DILUDID)  Tolerates HYDROmorphone (DILUDID)    Buspirone Other (See Comments)     Headache, migraine  migraines    Sertraline Muscle Pain (Myalgia)       REVIEW OF SYSTEMS:  Neg for bowel, bladder, and breast    O: Wt 65.3 kg (144 lb)   LMP 2022 (Approximate)   Breastfeeding Yes   BMI 24.72 kg/m    Body mass index  is 24.72 kg/m .    Exam:  Constitutional: healthy, alert, active, and no distress  Pelvic: deferred      Traver Depression Scale  Thoughts of Harming Self: Never  Total Score: 5        No results found for any visits on 10/05/23.      I/P:  Postpartum visit.    Resume annual preventive healthcare. Continue PNV's until done with childbearing.    RTC prn    Seth Morfin MD FACOG  2:35 PM 10/5/2023

## 2023-10-24 ENCOUNTER — OFFICE VISIT (OUTPATIENT)
Dept: FAMILY MEDICINE | Facility: OTHER | Age: 28
End: 2023-10-24
Attending: PHYSICIAN ASSISTANT
Payer: COMMERCIAL

## 2023-10-24 VITALS
OXYGEN SATURATION: 98 % | HEIGHT: 64 IN | BODY MASS INDEX: 24.28 KG/M2 | RESPIRATION RATE: 16 BRPM | HEART RATE: 103 BPM | WEIGHT: 142.2 LBS | TEMPERATURE: 98.2 F

## 2023-10-24 DIAGNOSIS — J02.9 SORE THROAT: Primary | ICD-10-CM

## 2023-10-24 DIAGNOSIS — H66.002 NON-RECURRENT ACUTE SUPPURATIVE OTITIS MEDIA OF LEFT EAR WITHOUT SPONTANEOUS RUPTURE OF TYMPANIC MEMBRANE: ICD-10-CM

## 2023-10-24 PROCEDURE — 99213 OFFICE O/P EST LOW 20 MIN: CPT | Performed by: PHYSICIAN ASSISTANT

## 2023-10-24 ASSESSMENT — ENCOUNTER SYMPTOMS: SORE THROAT: 1

## 2023-10-24 ASSESSMENT — PAIN SCALES - GENERAL: PAINLEVEL: NO PAIN (0)

## 2023-10-24 NOTE — PATIENT INSTRUCTIONS
"You were prescribed an antibiotic, please take into consideration the following information:  - Take entire course of antibiotic even if you start to feel better.  - Antibiotics can cause stomach upset including nausea and diarrhea. Read your bottle or ask the pharmacist if antibiotic can be taken with food to help prevent nausea. If you have symptoms of diarrhea you can take an over-the-counter probiotic and/or increase foods with probiotics such as yogurt, [place], sauerkraut.  -Use caution in sunlight as can lead to increased risk of sunburn while on ABX (antibiotics).     Please refer to your AVS for follow up and pain/symptoms management recommendations (I.e.: medications, helpful conservative treatment modalities, appropriate follow up if need to a specialist or family practice, etc.). Please return to urgent care if your symptoms change or worsen.     Discharge instructions:  -If you were prescribed a medication(s), please take this as prescribed/directed  -Monitor your symptoms, if changing/worsening, return to UC/ER or PCP for follow up    - For ear infection. Take entire course of antibiotics to ensure this clears (even if feeling better).  - Tylenol or ibuprofen for pain and fevers.   - Eat yogurt, kefir or take over-the-counter \"probiotic\" at least 2 hours before or after a dose of antibiotic. This will replace good bacteria that may have been lost due to the antibiotic. (This may also help to prevent yeast infections and upset stomach during the course of antibiotic.)  - In the future at onset of congestion: Blow nose or use bulb syringe to keep nasal congestion cleared and use saline nasal spray/flush.  -Alternative ibuprofen and tylenol as needed.   -Rest/relaxation and keeping hydrated with clear liquids (ie: water or gatorade). Using a humidifier may be beneficial as well.     * Recheck with family practice as needed or ER sooner with worsening or concerns.     If a strep test was performed:   We " "will call you with the results of the strep test, in the meantime, information below on viral colds/upper respiratory tract infections were provided (on average the test takes about 30-60 minutes to return).    If the strep test returns \"POSITIVE\" for strep, you will be prescribed an antibiotic, if this is the case, please take the entire course of antibiotic, even if feeling better prior to this. Continue with symptomatic treatment below as needed. If positive, please change toothbrush on day 2.     If the strep test returns \"NEGATIVE\" you do not need antibiotics and are to continue with conservative treatment as outlined below. Continue to monitor symptoms.       If a COVID swab was performed:     If COVID testing is negative, symptoms are improving (no need for antipyretics/fever reducers, etc.), that patient can return to normal activities of daily living.    If you test positive for COVID (regardless of vaccination status): stay home for 5 days. If you have no symptoms or symptoms are resolving after 5 days, you may leave the house per CDC guidelines. Continue to wear a mask around others for 5 days. You should also be fever free for 24 hours without the use of fever reducers (Tylenol/Ibuprofen).     If Influenza positive, follow school/employer guideline + fever free for 24 hours and symptom improvement.     If RSV positive, follow school/employer guideline + fever free for 24 hours and symptom improvement.     Please refer to your AVS for follow up and pain/symptoms management recommendations (I.e.: medications, helpful conservative treatment modalities, appropriate follow up if need to a specialist or family practice, etc.). Please return to urgent care if your symptoms change or worsen.     Discharge instructions:  -If you were prescribed a medication(s), please take this as prescribed/directed  -Monitor your symptoms, if changing/worsening, return to UC/ER or PCP for follow up    Symptomatic treatments " recommended.  - Antibiotics will not help with your symptoms, unless you were told otherwise today (strep throat, ear infection, etc. ). Education provided on symptoms of secondary bacterial infection such as new fever, chills, rigors, shortness of breath, increased work of breathing, that can occur with viral URI and need for further evaluation, if they occur.   - Ensure you are staying hydrated by drinking plenty of fluids and eating mild foods and advance diet as tolerated  - Honey can be soothing for sore throat (as long as above 12 months of age)  - Warm salt water gurgles can help soothe sore throat  - Humidifier can help with congestion and help keep mucus membranes such as throat and nose from drying out.  - Sleeping slightly propped up can help with congestion and postnasal drainage that can worsen cough at bedtime.  - As long as you have never been told to take Tylenol and/or Ibuprofen you can use them to manage fever and body aches per package instructions  Make sure you eat when you take ibuprofen to avoid stomach upset.  - OTC cough medications per package instructions to help with cough. Check to see if the cough/cold medication already has acetaminophen (Tylenol) in it. If it does avoid taking additional Tylenol.  - If sudden onset of new fever, worsening symptoms return for further evaluation.  - OTC antihistamine such as Allegra, Zyrtec, Claritin (generic is okay) can help with nasal/sinus congestion and OTC nasal steroid such as Flonase can help decrease sinus inflammation to help with congestion.  - Education provided on symptoms of post-viral bacterial infections including ear infection and pneumonia. This would require re-evaluation for treatment.

## 2023-10-24 NOTE — PROGRESS NOTES
Assessment & Plan       ICD-10-CM    1. Sore throat  J02.9 CANCELED: Group A Streptococcus PCR Throat Swab      2. Non-recurrent acute suppurative otitis media of left ear without spontaneous rupture of tympanic membrane  H66.002 amoxicillin-clavulanate (AUGMENTIN) 875-125 MG tablet        Cancelled PCR for strep. Amoxil will cover.   Vital signs stable. Non recurrent otitis media on left. Treatment includes: Augmentin, alternating tylenol and ibuprofen every 4-6 hours as needed (if able, daily limits reviewed in AVS and verbally with patient), warm compresses, other symptomatic remedies. Avoid trauma to ear(s) such as Q-tips. If symptoms change or worsen, recommend follow up for reevaluation (high fevers, worsening pain, abnormal drainage or odor from ear, etc.). Discussed if ear infections become increasingly common, as this point, a referral to ENT can be made, typically by PCP. Patient is in agreement and understanding of the above treatment plan. All questions and concerns were addressed and answered to patient's satisfaction. AVS reviewed with patient.      See Patient Instructions    Return if symptoms worsen or fail to improve.     Ana Woody PA-C  [facility]    Subjective   Paula is a 28 year old, presenting for the following health issues:  Pharyngitis        10/24/2023     2:59 PM   Additional Questions   Roomed by Anabela RENNER   Accompanied by children       History of Present Illness       Reason for visit:  Sore throat  Symptom onset:  1-3 days ago  Symptom intensity:  Mild  Symptom progression:  Worsening  Had these symptoms before:  No    She eats 2-3 servings of fruits and vegetables daily.She consumes 3 sweetened beverage(s) daily.She exercises with enough effort to increase her heart rate 30 to 60 minutes per day.  She exercises with enough effort to increase her heart rate 3 or less days per week.   She is taking medications regularly.     Paula presents today for  "evaluation along with her 3 children for sore throat and illness symptoms.  Her sore throat started this morning, her two older sons have been sick with sore throats, runny noses and feeling under the weather for the last day or so.  She also has left-sided ear discomfort which is intermittent in nature.  No nausea, vomiting, diarrhea or urinary changes.  No cough or congestion.  Utilizing over-the-counter analgesics as needed.    Acute Illness  Acute illness concerns: Strep throat  Onset/Duration: 1 day  Symptoms:  Fever: No  Chills/Sweats: No  Headache (location?): No  Sinus Pressure: No  Conjunctivitis:  No  Ear Pain: no  Rhinorrhea: No  Congestion: No  Sore Throat: YES  Cough: no  Wheeze: No  Decreased Appetite: No  Nausea: No  Vomiting: No  Diarrhea: No  Dysuria/Freq.: No  Dysuria or Hematuria: No  Fatigue/Achiness: No  Sick/Strep Exposure: No  Therapies tried and outcome: None    Review of Systems   HENT:  Positive for sore throat.       Constitutional, HEENT, cardiovascular, pulmonary, GI, , musculoskeletal, neuro, skin, endocrine and psych systems are negative, except as otherwise noted.      Objective    Pulse 103   Temp 98.2  F (36.8  C) (Temporal)   Resp 16   Ht 1.626 m (5' 4\")   Wt 64.5 kg (142 lb 3.2 oz)   LMP 11/16/2022 (Approximate)   SpO2 98%   Breastfeeding Yes   BMI 24.41 kg/m    Body mass index is 24.41 kg/m .  Physical Exam   GENERAL: healthy, alert and no distress  EYES: Eyes grossly normal to inspection, PERRL and conjunctivae and sclerae normal  HENT: normal cephalic/atraumatic, right ear: normal: no effusions, no erythema, normal landmarks, left ear: clear effusion and erythematous, nose and mouth without ulcers or lesions, oropharynx erythematous, no tonsillar adenopathy, and oral mucous membranes moist  NECK: no adenopathy, no asymmetry, masses, or scars and thyroid normal to palpation  RESP: lungs clear to auscultation - no rales, rhonchi or wheezes  CV: regular rate and rhythm, " normal S1 S2, no S3 or S4, no murmur, click or rub, no peripheral edema and peripheral pulses strong  ABDOMEN: soft, nontender, no hepatosplenomegaly, no masses and bowel sounds normal  SKIN: no suspicious lesions or rashes  PSYCH: mentation appears normal, affect normal/bright  LYMPH: normal ant/post cervical, supraclavicular nodes

## 2023-10-24 NOTE — NURSING NOTE
"Patient presents to the clinic for strep throat    FOOD SECURITY SCREENING QUESTIONS:    The next two questions are to help us understand your food security.  If you are feeling you need any assistance in this area, we have resources available to support you today.    Hunger Vital Signs:  Within the past 12 months we worried whether our food would run out before we got money to buy more. Never  Within the past 12 months the food we bought just didn't last and we didn't have money to get more. Never    Advance Care Directive on file? No  Advance Care Directive provided to patient? Declined     Chief Complaint   Patient presents with    Pharyngitis       Initial Pulse 103   Temp 98.2  F (36.8  C) (Temporal)   Resp 16   Ht 1.626 m (5' 4\")   Wt 64.5 kg (142 lb 3.2 oz)   LMP 11/16/2022 (Approximate)   SpO2 98%   Breastfeeding Yes   BMI 24.41 kg/m   Estimated body mass index is 24.41 kg/m  as calculated from the following:    Height as of this encounter: 1.626 m (5' 4\").    Weight as of this encounter: 64.5 kg (142 lb 3.2 oz).  Medication Reconciliation: complete        Anabela Ahuja LPN on 10/24/2023 at 3:01 PM    "

## 2024-01-04 ENCOUNTER — OFFICE VISIT (OUTPATIENT)
Dept: FAMILY MEDICINE | Facility: OTHER | Age: 29
End: 2024-01-04
Payer: COMMERCIAL

## 2024-01-04 VITALS
SYSTOLIC BLOOD PRESSURE: 136 MMHG | HEART RATE: 85 BPM | TEMPERATURE: 97.9 F | DIASTOLIC BLOOD PRESSURE: 88 MMHG | OXYGEN SATURATION: 100 % | WEIGHT: 143 LBS | BODY MASS INDEX: 24.41 KG/M2 | RESPIRATION RATE: 16 BRPM | HEIGHT: 64 IN

## 2024-01-04 DIAGNOSIS — J10.1 INFLUENZA A: Primary | ICD-10-CM

## 2024-01-04 DIAGNOSIS — J02.9 SORE THROAT: ICD-10-CM

## 2024-01-04 DIAGNOSIS — R05.1 ACUTE COUGH: ICD-10-CM

## 2024-01-04 DIAGNOSIS — H66.002 NON-RECURRENT ACUTE SUPPURATIVE OTITIS MEDIA OF LEFT EAR WITHOUT SPONTANEOUS RUPTURE OF TYMPANIC MEMBRANE: ICD-10-CM

## 2024-01-04 LAB
FLUAV RNA SPEC QL NAA+PROBE: POSITIVE
FLUBV RNA RESP QL NAA+PROBE: NEGATIVE
GROUP A STREP BY PCR: NOT DETECTED
RSV RNA SPEC NAA+PROBE: NEGATIVE
SARS-COV-2 RNA RESP QL NAA+PROBE: NEGATIVE

## 2024-01-04 PROCEDURE — 87651 STREP A DNA AMP PROBE: CPT | Mod: ZL

## 2024-01-04 PROCEDURE — 87637 SARSCOV2&INF A&B&RSV AMP PRB: CPT | Mod: ZL

## 2024-01-04 PROCEDURE — 99213 OFFICE O/P EST LOW 20 MIN: CPT

## 2024-01-04 RX ORDER — OSELTAMIVIR PHOSPHATE 75 MG/1
75 CAPSULE ORAL 2 TIMES DAILY
Qty: 10 CAPSULE | Refills: 0 | Status: SHIPPED | OUTPATIENT
Start: 2024-01-04 | End: 2024-01-09

## 2024-01-04 RX ORDER — CEFDINIR 300 MG/1
300 CAPSULE ORAL 2 TIMES DAILY
Qty: 14 CAPSULE | Refills: 0 | Status: SHIPPED | OUTPATIENT
Start: 2024-01-04 | End: 2024-01-11

## 2024-01-04 ASSESSMENT — PAIN SCALES - GENERAL: PAINLEVEL: MODERATE PAIN (4)

## 2024-01-04 NOTE — NURSING NOTE
"Chief Complaint   Patient presents with    Cough     X 2 days    Headache     X 2 days     Nursing Mom  Tx with tylenol and ibuprofen    Patient is concerned with what to take for sx due to low milk supply    Initial /88 (BP Location: Left arm, Patient Position: Sitting, Cuff Size: Adult Regular)   Pulse 85   Temp 97.9  F (36.6  C) (Tympanic)   Resp 16   Ht 1.626 m (5' 4\")   Wt 64.9 kg (143 lb)   SpO2 100%   Breastfeeding Yes   BMI 24.55 kg/m   Estimated body mass index is 24.55 kg/m  as calculated from the following:    Height as of this encounter: 1.626 m (5' 4\").    Weight as of this encounter: 64.9 kg (143 lb).     Advance Care Directive on file? yes    FOOD SECURITY SCREENING QUESTIONS:    The next two questions are to help us understand your food security.  If you are feeling you need any assistance in this area, we have resources available to support you today.    Hunger Vital Signs:  Within the past 12 months we worried whether our food would run out before we got money to buy more. Never  Within the past 12 months the food we bought just didn't last and we didn't have money to get more. Never  Melani Pop LPN,DALIA on 1/4/2024 at 9:12 AM      Melani Pop LPN     "

## 2024-01-04 NOTE — PROGRESS NOTES
ASSESSMENT/PLAN:    I have reviewed the nursing notes.  I have reviewed the findings, diagnosis, plan and need for follow up with the patient.    1. Influenza A  2. Sore throat  3. Acute cough  - Symptomatic Influenza A/B, RSV, & SARS-CoV2 PCR (COVID-19) Nose  - Group A Streptococcus PCR Throat Swab  - oseltamivir (TAMIFLU) 75 MG capsule; Take 1 capsule (75 mg) by mouth 2 times daily for 5 days  Dispense: 10 capsule; Refill: 0    Patient presents with upper respiratory symptoms.  Patient's vitals are stable and she appears nontoxic.  Multiplex test was positive for influenza A.  Will treat with Tamiflu.  Advised patient that she should quarantine for 5 days from symptom onset and then be fever free for 24 hours. Discussed symptomatic treatment - Encouraged fluids, salt water gargles, honey (only if greater than 1 year in age due to risk of botulism), elevation, humidifier, sinus rinse/netti pot, lozenges, tea, topical vapor rub, popsicles, rest, etc. May use over-the-counter Tylenol or ibuprofen PRN.    4. Non-recurrent acute suppurative otitis media of left ear without spontaneous rupture of tympanic membrane  - cefdinir (OMNICEF) 300 MG capsule; Take 1 capsule (300 mg) by mouth 2 times daily for 7 days  Dispense: 14 capsule; Refill: 0    Physical exam and symptoms also consistent with left otitis media.  Will treat with Omnicef.  Advised patient that she can take Tylenol and ibuprofen as needed for ear pain.    Discussed warning signs/symptoms indicative of need to f/u    Follow up if symptoms persist or worsen or concerns    I explained my diagnostic considerations and recommendations to the patient, who voiced understanding and agreement with the treatment plan. All questions were answered. We discussed potential side effects of any prescribed or recommended therapies, as well as expectations for response to treatments.    Isaiah Win, VICK CNP  1/4/2024  9:22 AM    HPI:    Paula Hagan is a 28 year old  female  who presents to [facility] today for concerns of URI symptoms    URI, x 2 days    Symptoms:  YES: +  fevers or chills. Fever, highest reported temperature: 103 F  YES: +  sore throat/pharyngitis/tonsillitis.   YES: +  allergy/URI Symptoms  No muffled sounds/change in hearing  YES: +  sensation of fullness in ear(s)  No ringing in ears/tinnitus  No balance changes  No dizziness  YES: +  congestion (head/nasal/chest)  YES: +  cough/productive cough  YES: +  post nasal drip   YES: +  headache  No sinus pain/pressure  YES: +  myalgias  YES: +  otalgia  No rash  Activity Level Changes: Yes: increased fatigue  Appetite/Liquid Intake Changes: Yes: decreased  Changes to Bowel Habits: No  Changes to Bladder Habits: No  Additional Symptoms to Report: Yes: shortness of breath, nausea  History of similar symptoms: No  Prior workup: No    Treatments tried: Tylenol/Ibuprofen, Fluids, and Rest    Site of exposure: not known.  Type of exposure: not known    Other Pertinent History: none    Allergies: reviewed    PCP: [facility] [place]    Past Medical History:   Diagnosis Date    History of diabetes insipidus     in first pregnancy    History of gestational hypertension     Uncomplicated asthma     Varicella      Past Surgical History:   Procedure Laterality Date    APPENDECTOMY       SECTION  2018     SECTION N/A 2021    Procedure:  SECTION;  Surgeon: Seth Morfin MD;  Location: [facility] OR     SECTION N/A 2023    Procedure:  SECTION;  Surgeon: Radha Reed MD;  Location: [facility] L[facility]D    INSERT FETAL THORACOAMNIOTIC SHUNT N/A 2023    Procedure: INSERTION, SHUNT, THORACOAMNIOTIC  Plan for thoracentesis only 23. Fluid will be sent to lab. No shunt placement per MD.;  Surgeon: Abrahan Santos MD;  Location: [facility] L[facility]    INSERT FETAL THORACOAMNIOTIC SHUNT Bilateral 2023    Procedure: INSERTION, SHUNT, THORACOAMNIOTIC BILATERAL;  Surgeon: Tom  "Abrahan VANG MD;  Location:  L+D     Social History     Tobacco Use    Smoking status: Never    Smokeless tobacco: Never   Substance Use Topics    Alcohol use: Not Currently     Current Outpatient Medications   Medication Sig Dispense Refill    acetaminophen (TYLENOL) 325 MG tablet Take 2 tablets (650 mg) by mouth every 6 hours as needed for mild pain Start after Delivery. 100 tablet 0    albuterol (PROAIR HFA/PROVENTIL HFA/VENTOLIN HFA) 108 (90 Base) MCG/ACT inhaler Inhale 1-2 puffs into the lungs every 4 hours as needed      norethindrone (MICRONOR) 0.35 MG tablet Take 1 tablet (0.35 mg) by mouth daily 84 tablet 3    Prenatal Vit-Fe Fumarate-FA (PRENATAL MULTIVITAMIN W/IRON) 27-0.8 MG tablet Take 1 tablet by mouth daily       Allergies   Allergen Reactions    Bee Venom Anaphylaxis    Morphine Itching and Hives     Tolerates HYDROmorphone (DILUDID)  Tolerates HYDROmorphone (DILUDID)    Buspirone Other (See Comments)     Headache, migraine  migraines    Sertraline Muscle Pain (Myalgia)     Past medical history, past surgical history, current medications and allergies reviewed and accurate to the best of my knowledge.      ROS:  Refer to HPI    /88 (BP Location: Left arm, Patient Position: Sitting, Cuff Size: Adult Regular)   Pulse 85   Temp 97.9  F (36.6  C) (Tympanic)   Resp 16   Ht 1.626 m (5' 4\")   Wt 64.9 kg (143 lb)   SpO2 100%   Breastfeeding Yes   BMI 24.55 kg/m      EXAM:  General Appearance: Well appearing 28 year old female, appropriate appearance for age. No acute distress   Ears: Left TM intact, mild erythema, effusion, bulging, and purulence.  Right TM intact, translucent with bony landmarks appreciated, no erythema, no effusion, no bulging, no purulence.  Left auditory canal clear.  Right auditory canal clear.  Normal external ears, non tender.  Eyes: conjunctivae normal without erythema or irritation, corneas clear, no drainage or crusting, no eyelid swelling, pupils equal   Oropharynx: " moist mucous membranes, posterior pharynx without erythema, tonsils symmetric and 1+, no erythema, no exudates or petechiae, no post nasal drip seen, no trismus, voice clear.    Nose:  Bilateral nares: no erythema, no edema, clear drainage and congestion   Neck: supple without adenopathy  Respiratory: normal chest wall and respirations.  Normal effort.  Clear to auscultation bilaterally, no wheezing, crackles or rhonchi.  No increased work of breathing.  No cough appreciated.  Cardiac: RRR with no murmurs  Musculoskeletal:  Equal movement of bilateral upper extremities.  Equal movement of bilateral lower extremities.  Normal gait.    Dermatological: no rashes noted of exposed skin  Neuro: Alert and oriented to person, place, and time.    Psychological: normal affect, alert, oriented, and pleasant.     Labs:  Results for orders placed or performed in visit on 01/04/24   Symptomatic Influenza A/B, RSV, & SARS-CoV2 PCR (COVID-19) Nose     Status: Abnormal    Specimen: Nose; Swab   Result Value Ref Range    Influenza A PCR Positive (A) Negative    Influenza B PCR Negative Negative    RSV PCR Negative Negative    SARS CoV2 PCR Negative Negative    Narrative    Testing was performed using the Xpert Xpress CoV2/Flu/RSV Assay on the [employer] GeneXpert Instrument. This test should be ordered for the detection of SARS-CoV-2, influenza, and RSV viruses in individuals who meet clinical and/or epidemiological criteria. Test performance is unknown in asymptomatic patients. This test is for in vitro diagnostic use under the FDA EUA for laboratories certified under CLIA to perform high or moderate complexity testing. This test has not been FDA cleared or approved. A negative result does not rule out the presence of PCR inhibitors in the specimen or target RNA in concentration below the limit of detection for the assay. If only one viral target is positive but coinfection with multiple targets is suspected, the sample should be  re-tested with another FDA cleared, approved, or authorized test, if coinfection would change clinical management. This test was validated by the [facility]. These laboratories are certified under the Clinical Laboratory Improvement Amendments of 1988 (CLIA-88) as qualified to perform high complexity laboratory testing.   Group A Streptococcus PCR Throat Swab     Status: Normal    Specimen: Throat; Swab   Result Value Ref Range    Group A strep by PCR Not Detected Not Detected    Narrative    The Xpert Xpress Strep A test, performed on the [employer], is a rapid, qualitative in vitro diagnostic test for the detection of Streptococcus pyogenes (Group A ß-hemolytic Streptococcus, Strep A) in throat swab specimens from patients with signs and symptoms of pharyngitis. The Xpert Xpress Strep A test can be used as an aid in the diagnosis of Group A Streptococcal pharyngitis. The assay is not intended to monitor treatment for Group A Streptococcus infections. The Xpert Xpress Strep A test utilizes an automated real-time polymerase chain reaction (PCR) to detect Streptococcus pyogenes DNA.

## 2024-04-04 NOTE — DISCHARGE INSTRUCTIONS
Ibuprofen 600 mg with food every 6 hours as needed for jaw pain or swelling. Ice massage to jaw for discomfort.     Eye drops to both eyes for next 1 week.     Schedule follow up 1 week in primary clinic for follow up    stated

## 2024-04-10 ENCOUNTER — OFFICE VISIT (OUTPATIENT)
Dept: OBGYN | Facility: OTHER | Age: 29
End: 2024-04-10
Payer: COMMERCIAL

## 2024-04-10 VITALS
HEART RATE: 80 BPM | SYSTOLIC BLOOD PRESSURE: 132 MMHG | WEIGHT: 148 LBS | BODY MASS INDEX: 25.4 KG/M2 | DIASTOLIC BLOOD PRESSURE: 76 MMHG

## 2024-04-10 DIAGNOSIS — Z12.4 ENCOUNTER FOR SCREENING FOR CERVICAL CANCER: Primary | ICD-10-CM

## 2024-04-10 DIAGNOSIS — B96.89 BACTERIAL VAGINOSIS: ICD-10-CM

## 2024-04-10 DIAGNOSIS — Z30.430 ENCOUNTER FOR INTRAUTERINE DEVICE PLACEMENT: ICD-10-CM

## 2024-04-10 DIAGNOSIS — Z01.812 PRE-PROCEDURE LAB EXAM: ICD-10-CM

## 2024-04-10 DIAGNOSIS — N76.0 BACTERIAL VAGINOSIS: ICD-10-CM

## 2024-04-10 LAB — HCG UR QL: NEGATIVE

## 2024-04-10 PROCEDURE — 250N000011 HC RX IP 250 OP 636

## 2024-04-10 PROCEDURE — 58300 INSERT INTRAUTERINE DEVICE: CPT

## 2024-04-10 PROCEDURE — G0123 SCREEN CERV/VAG THIN LAYER: HCPCS

## 2024-04-10 PROCEDURE — 81025 URINE PREGNANCY TEST: CPT | Mod: ZL

## 2024-04-10 RX ADMIN — LEVONORGESTREL 1 EACH: 52 INTRAUTERINE DEVICE INTRAUTERINE at 16:19

## 2024-04-10 NOTE — NURSING NOTE
Chief Complaint   Patient presents with    Procedure     IUD Placement        Medication Reconciliation: complete        Radha Brady LPN

## 2024-04-10 NOTE — PROGRESS NOTES
IUD Insertion Procedure Visit    SUBJECTIVE: Paula Hagan is a 29 year old female, requests Mirena.     Verification of Procedure:  Just before the procedure begans through verbal and active participation of team members, I verified:     Initials   Patient Name Paula Hagan    Patient  1995    Procedure to be performed IUD insertion     Consent:  Risks, benefits of treatment, and alternative options for contraception were discussed.  Patient's questions were elicited and answered.  Written consent was obtained and scanned into medical record.       OBJECTIVE: /76   Pulse 80   Wt 67.1 kg (148 lb)   Breastfeeding No   BMI 25.40 kg/m      Pelvic Exam:  Pelvic:  Normal appearing external female genitalia. Normal hair distribution. Vagina is without lesions. Small discharge. Cervix normal, no lesions, no cervical motion tenderness. Uterus is small, mobile, non-tender. No adnexal tenderness or masses. PAP collected      PROCEDURE NOTE  --  Mirena Insertion    Reason for Insertion:  contraception.    Pregnancy test: Negative    Counseling:  Patient counseled on efficacy, benefits, risks, potential side effects of IUD.  Insertion procedure and risk of infection, perforation, and spontaneous expulsion reviewed.   Advised to plan removal and/or replacement of IUD in 8 years from today or when desired.         Under sterile technique, cervix was visualized with a small  Graves speculum and prepped with Betadine solution. The uterus was sounded to 8 cm. The Mirena IUD insertion apparatus was prepared and placed through the cervix without significant resistance and deployed at the fundus in the usual fashion. The strings were trimmed 3 cm from the external os.       EBL:  Minimal     Complications:  None      Paula Hagan tolerated well.     PLAN:      Written information on IUD use reviewed and given.  Symptoms to report reviewed. To report heavy bleeding, severe cramping, or abnormal vaginal discharge.   May take Ibuprofen 400-800 mg PO TID PRN or Naproxen 500 mg PO BID for cramping. Reminded to check for IUD strings every month. Patient has been counseled to use backup birth control method for 1 week.  Return to clinic in 4-6 weeks for string check. Paula Hagan  verbalized understanding of instructions.    MARK Robledo-C  4/10/2024 4:18 PM

## 2024-04-12 LAB
BKR LAB AP GYN ADEQUACY: NORMAL
BKR LAB AP GYN INTERPRETATION: NORMAL
BKR LAB AP GYN OTHER FINDINGS: NORMAL
BKR LAB AP HPV REFLEX: NORMAL
BKR LAB AP PREVIOUS ABNORMAL: NORMAL
PATH REPORT.COMMENTS IMP SPEC: NORMAL
PATH REPORT.COMMENTS IMP SPEC: NORMAL
PATH REPORT.RELEVANT HX SPEC: NORMAL

## 2024-04-12 RX ORDER — METRONIDAZOLE 500 MG/1
500 TABLET ORAL 2 TIMES DAILY
Qty: 14 TABLET | Refills: 0 | Status: SHIPPED | OUTPATIENT
Start: 2024-04-12 | End: 2024-04-19

## 2024-05-09 ENCOUNTER — OFFICE VISIT (OUTPATIENT)
Dept: OBGYN | Facility: OTHER | Age: 29
End: 2024-05-09
Payer: COMMERCIAL

## 2024-05-09 VITALS
OXYGEN SATURATION: 100 % | SYSTOLIC BLOOD PRESSURE: 132 MMHG | HEART RATE: 75 BPM | WEIGHT: 145.8 LBS | DIASTOLIC BLOOD PRESSURE: 82 MMHG | BODY MASS INDEX: 25.03 KG/M2

## 2024-05-09 DIAGNOSIS — N92.1 BREAKTHROUGH BLEEDING WITH IUD: ICD-10-CM

## 2024-05-09 DIAGNOSIS — Z30.431 ENCOUNTER FOR ROUTINE CHECKING OF INTRAUTERINE CONTRACEPTIVE DEVICE (IUD): Primary | ICD-10-CM

## 2024-05-09 DIAGNOSIS — Z97.5 BREAKTHROUGH BLEEDING WITH IUD: ICD-10-CM

## 2024-05-09 PROCEDURE — 99213 OFFICE O/P EST LOW 20 MIN: CPT

## 2024-05-09 RX ORDER — ACETAMINOPHEN AND CODEINE PHOSPHATE 120; 12 MG/5ML; MG/5ML
0.35 SOLUTION ORAL DAILY
Qty: 28 TABLET | Refills: 0 | Status: SHIPPED | OUTPATIENT
Start: 2024-05-09

## 2024-05-09 ASSESSMENT — PAIN SCALES - GENERAL: PAINLEVEL: NO PAIN (0)

## 2024-05-09 NOTE — NURSING NOTE
"Chief Complaint   Patient presents with    IUD CHECK     Pt has had break through bleeding for the last 2 weeks. She would like to sta with the IUD but only if the bleeding stops.  No pain. Pt reports her periods are heavier now     Initial /82 (BP Location: Right arm, Patient Position: Sitting, Cuff Size: Adult Regular)   Pulse 75   Wt 66.1 kg (145 lb 12.8 oz)   SpO2 100%   BMI 25.03 kg/m   Estimated body mass index is 25.03 kg/m  as calculated from the following:    Height as of 1/4/24: 1.626 m (5' 4\").    Weight as of this encounter: 66.1 kg (145 lb 12.8 oz).  Medication Reconciliation: complete    Gin Sanchez RN    "

## 2024-05-09 NOTE — PROGRESS NOTES
Follow-Up Visit    S: Ms. Paula Hagan is a 29 year old  here for IUD check. She had a Mirena placed on 4/10/24. She reports a fair amount of irregular bleeding which is very bothersome. She would like to try birth control to help this. .     O:  There were no vitals taken for this visit.  Gen: Well-appearing, NAD  Pulm: nonlabored  Psych: appropriate mood and affect    Pelvic:  Normal appearing external female genitalia. Normal hair distribution. Vagina is without lesions. Small bloody discharge. Cervix normal, IUD strings appear appropriate length    A/P:  Ms. Paula Hagan is a 29 year old  here for IUD check. I have reviewed allergies, medication list, problem list, and past medical history. Discussed expected bleeding patterns. Birth control added X1 month to assist with break through bleeding.    - RTC 1 year for IUD check or sooner MARK Quijano-C  2024 3:41 PM
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
## 2025-04-06 SDOH — HEALTH STABILITY: PHYSICAL HEALTH: ON AVERAGE, HOW MANY DAYS PER WEEK DO YOU ENGAGE IN MODERATE TO STRENUOUS EXERCISE (LIKE A BRISK WALK)?: 3 DAYS

## 2025-04-06 SDOH — HEALTH STABILITY: PHYSICAL HEALTH: ON AVERAGE, HOW MANY MINUTES DO YOU ENGAGE IN EXERCISE AT THIS LEVEL?: 30 MIN

## 2025-04-06 ASSESSMENT — SOCIAL DETERMINANTS OF HEALTH (SDOH): HOW OFTEN DO YOU GET TOGETHER WITH FRIENDS OR RELATIVES?: TWICE A WEEK

## 2025-04-07 ENCOUNTER — OFFICE VISIT (OUTPATIENT)
Dept: FAMILY MEDICINE | Facility: OTHER | Age: 30
End: 2025-04-07
Attending: PHYSICIAN ASSISTANT
Payer: COMMERCIAL

## 2025-04-07 VITALS
BODY MASS INDEX: 26.58 KG/M2 | TEMPERATURE: 98.6 F | WEIGHT: 150 LBS | DIASTOLIC BLOOD PRESSURE: 82 MMHG | RESPIRATION RATE: 20 BRPM | HEIGHT: 63 IN | HEART RATE: 96 BPM | SYSTOLIC BLOOD PRESSURE: 120 MMHG

## 2025-04-07 DIAGNOSIS — F41.1 GAD (GENERALIZED ANXIETY DISORDER): ICD-10-CM

## 2025-04-07 DIAGNOSIS — L65.9 HAIR LOSS: ICD-10-CM

## 2025-04-07 DIAGNOSIS — R09.A2 GLOBUS SENSATION: ICD-10-CM

## 2025-04-07 DIAGNOSIS — E53.8 VITAMIN B12 DEFICIENCY (NON ANEMIC): ICD-10-CM

## 2025-04-07 DIAGNOSIS — Z00.00 ROUTINE GENERAL MEDICAL EXAMINATION AT A HEALTH CARE FACILITY: Primary | ICD-10-CM

## 2025-04-07 DIAGNOSIS — L60.3 BRITTLE NAILS: ICD-10-CM

## 2025-04-07 DIAGNOSIS — Z13.1 SCREENING FOR DIABETES MELLITUS: ICD-10-CM

## 2025-04-07 DIAGNOSIS — R53.82 CHRONIC FATIGUE: ICD-10-CM

## 2025-04-07 DIAGNOSIS — E83.19 IRON EXCESS: ICD-10-CM

## 2025-04-07 LAB
ALBUMIN SERPL BCG-MCNC: 4.8 G/DL (ref 3.5–5.2)
ALP SERPL-CCNC: 69 U/L (ref 40–150)
ALT SERPL W P-5'-P-CCNC: 29 U/L (ref 0–50)
ANION GAP SERPL CALCULATED.3IONS-SCNC: 10 MMOL/L (ref 7–15)
AST SERPL W P-5'-P-CCNC: 32 U/L (ref 0–45)
BILIRUB SERPL-MCNC: 2.3 MG/DL
BUN SERPL-MCNC: 16.5 MG/DL (ref 6–20)
CALCIUM SERPL-MCNC: 9.9 MG/DL (ref 8.8–10.4)
CHLORIDE SERPL-SCNC: 103 MMOL/L (ref 98–107)
CREAT SERPL-MCNC: 0.81 MG/DL (ref 0.51–0.95)
EGFRCR SERPLBLD CKD-EPI 2021: >90 ML/MIN/1.73M2
ERYTHROCYTE [DISTWIDTH] IN BLOOD BY AUTOMATED COUNT: 13.5 % (ref 10–15)
EST. AVERAGE GLUCOSE BLD GHB EST-MCNC: 85 MG/DL
FERRITIN SERPL-MCNC: 134 NG/ML (ref 6–175)
GLUCOSE SERPL-MCNC: 99 MG/DL (ref 70–99)
HBA1C MFR BLD: 4.6 %
HCO3 SERPL-SCNC: 26 MMOL/L (ref 22–29)
HCT VFR BLD AUTO: 39.7 % (ref 35–47)
HGB BLD-MCNC: 13.4 G/DL (ref 11.7–15.7)
IRON BINDING CAPACITY (ROCHE): ABNORMAL
IRON SATN MFR SERPL: ABNORMAL %
IRON SERPL-MCNC: 367 UG/DL (ref 37–145)
MCH RBC QN AUTO: 32.6 PG (ref 26.5–33)
MCHC RBC AUTO-ENTMCNC: 33.8 G/DL (ref 31.5–36.5)
MCV RBC AUTO: 97 FL (ref 78–100)
PLATELET # BLD AUTO: 173 10E3/UL (ref 150–450)
POTASSIUM SERPL-SCNC: 4.2 MMOL/L (ref 3.4–5.3)
PROT SERPL-MCNC: 7.4 G/DL (ref 6.4–8.3)
RBC # BLD AUTO: 4.11 10E6/UL (ref 3.8–5.2)
SODIUM SERPL-SCNC: 139 MMOL/L (ref 135–145)
T3FREE SERPL-MCNC: 3.2 PG/ML (ref 2–4.4)
T4 FREE SERPL-MCNC: 1.04 NG/DL (ref 0.9–1.7)
TSH SERPL DL<=0.005 MIU/L-ACNC: 2.67 UIU/ML (ref 0.3–4.2)
VIT B12 SERPL-MCNC: 333 PG/ML (ref 232–1245)
VIT D+METAB SERPL-MCNC: 40 NG/ML (ref 20–50)
WBC # BLD AUTO: 5.5 10E3/UL (ref 4–11)

## 2025-04-07 PROCEDURE — 82040 ASSAY OF SERUM ALBUMIN: CPT | Mod: ZL | Performed by: PHYSICIAN ASSISTANT

## 2025-04-07 PROCEDURE — 99395 PREV VISIT EST AGE 18-39: CPT | Performed by: PHYSICIAN ASSISTANT

## 2025-04-07 PROCEDURE — 82306 VITAMIN D 25 HYDROXY: CPT | Mod: ZL | Performed by: PHYSICIAN ASSISTANT

## 2025-04-07 PROCEDURE — G2211 COMPLEX E/M VISIT ADD ON: HCPCS | Performed by: PHYSICIAN ASSISTANT

## 2025-04-07 PROCEDURE — 36415 COLL VENOUS BLD VENIPUNCTURE: CPT | Mod: ZL | Performed by: PHYSICIAN ASSISTANT

## 2025-04-07 PROCEDURE — 84481 FREE ASSAY (FT-3): CPT | Mod: ZL | Performed by: PHYSICIAN ASSISTANT

## 2025-04-07 PROCEDURE — 83036 HEMOGLOBIN GLYCOSYLATED A1C: CPT | Mod: ZL | Performed by: PHYSICIAN ASSISTANT

## 2025-04-07 PROCEDURE — 82607 VITAMIN B-12: CPT | Mod: ZL | Performed by: PHYSICIAN ASSISTANT

## 2025-04-07 PROCEDURE — 85027 COMPLETE CBC AUTOMATED: CPT | Mod: ZL | Performed by: PHYSICIAN ASSISTANT

## 2025-04-07 PROCEDURE — 80051 ELECTROLYTE PANEL: CPT | Mod: ZL | Performed by: PHYSICIAN ASSISTANT

## 2025-04-07 PROCEDURE — 84439 ASSAY OF FREE THYROXINE: CPT | Mod: ZL | Performed by: PHYSICIAN ASSISTANT

## 2025-04-07 PROCEDURE — 82728 ASSAY OF FERRITIN: CPT | Mod: ZL | Performed by: PHYSICIAN ASSISTANT

## 2025-04-07 PROCEDURE — 80053 COMPREHEN METABOLIC PANEL: CPT | Mod: ZL | Performed by: PHYSICIAN ASSISTANT

## 2025-04-07 PROCEDURE — 99214 OFFICE O/P EST MOD 30 MIN: CPT | Mod: 25 | Performed by: PHYSICIAN ASSISTANT

## 2025-04-07 PROCEDURE — 84443 ASSAY THYROID STIM HORMONE: CPT | Mod: ZL | Performed by: PHYSICIAN ASSISTANT

## 2025-04-07 PROCEDURE — 85048 AUTOMATED LEUKOCYTE COUNT: CPT | Mod: ZL | Performed by: PHYSICIAN ASSISTANT

## 2025-04-07 PROCEDURE — 83550 IRON BINDING TEST: CPT | Mod: ZL | Performed by: PHYSICIAN ASSISTANT

## 2025-04-07 PROCEDURE — 86376 MICROSOMAL ANTIBODY EACH: CPT | Mod: ZL | Performed by: PHYSICIAN ASSISTANT

## 2025-04-07 PROCEDURE — 83540 ASSAY OF IRON: CPT | Mod: ZL | Performed by: PHYSICIAN ASSISTANT

## 2025-04-07 RX ORDER — LANOLIN ALCOHOL/MO/W.PET/CERES
1000 CREAM (GRAM) TOPICAL DAILY
Qty: 90 TABLET | Refills: 3 | Status: SHIPPED | OUTPATIENT
Start: 2025-04-07

## 2025-04-07 RX ORDER — HYDROXYZINE HYDROCHLORIDE 25 MG/1
25 TABLET, FILM COATED ORAL 3 TIMES DAILY PRN
Qty: 30 TABLET | Refills: 5 | Status: SHIPPED | OUTPATIENT
Start: 2025-04-07

## 2025-04-07 ASSESSMENT — PAIN SCALES - GENERAL: PAINLEVEL_OUTOF10: NO PAIN (0)

## 2025-04-07 NOTE — PATIENT INSTRUCTIONS
Patient Education   Preventive Care Advice   This is general advice given by our system to help you stay healthy. However, your care team may have specific advice just for you. Please talk to your care team about your preventive care needs.  Nutrition  Eat 5 or more servings of fruits and vegetables each day.  Try wheat bread, brown rice and whole grain pasta (instead of white bread, rice, and pasta).  Get enough calcium and vitamin D. Check the label on foods and aim for 100% of the RDA (recommended daily allowance).  Lifestyle  Exercise at least 150 minutes each week  (30 minutes a day, 5 days a week).  Do muscle strengthening activities 2 days a week. These help control your weight and prevent disease.  No smoking.  Wear sunscreen to prevent skin cancer.  Have a dental exam and cleaning every 6 months.  Yearly exams  See your health care team every year to talk about:  Any changes in your health.  Any medicines your care team has prescribed.  Preventive care, family planning, and ways to prevent chronic diseases.  Shots (vaccines)   HPV shots (up to age 26), if you've never had them before.  Hepatitis B shots (up to age 59), if you've never had them before.  COVID-19 shot: Get this shot when it's due.  Flu shot: Get a flu shot every year.  Tetanus shot: Get a tetanus shot every 10 years.  Pneumococcal, hepatitis A, and RSV shots: Ask your care team if you need these based on your risk.  Shingles shot (for age 50 and up)  General health tests  Diabetes screening:  Starting at age 35, Get screened for diabetes at least every 3 years.  If you are younger than age 35, ask your care team if you should be screened for diabetes.  Cholesterol test: At age 39, start having a cholesterol test every 5 years, or more often if advised.  Bone density scan (DEXA): At age 50, ask your care team if you should have this scan for osteoporosis (brittle bones).  Hepatitis C: Get tested at least once in your life.  STIs (sexually  transmitted infections)  Before age 24: Ask your care team if you should be screened for STIs.  After age 24: Get screened for STIs if you're at risk. You are at risk for STIs (including HIV) if:  You are sexually active with more than one person.  You don't use condoms every time.  You or a partner was diagnosed with a sexually transmitted infection.  If you are at risk for HIV, ask about PrEP medicine to prevent HIV.  Get tested for HIV at least once in your life, whether you are at risk for HIV or not.  Cancer screening tests  Cervical cancer screening: If you have a cervix, begin getting regular cervical cancer screening tests starting at age 21.  Breast cancer scan (mammogram): If you've ever had breasts, begin having regular mammograms starting at age 40. This is a scan to check for breast cancer.  Colon cancer screening: It is important to start screening for colon cancer at age 45.  Have a colonoscopy test every 10 years (or more often if you're at risk) Or, ask your provider about stool tests like a FIT test every year or Cologuard test every 3 years.  To learn more about your testing options, visit:   .  For help making a decision, visit:   https://bit.ly/sf73794.  Prostate cancer screening test: If you have a prostate, ask your care team if a prostate cancer screening test (PSA) at age 55 is right for you.  Lung cancer screening: If you are a current or former smoker ages 50 to 80, ask your care team if ongoing lung cancer screenings are right for you.  For informational purposes only. Not to replace the advice of your health care provider. Copyright   2023 [facility]. All rights reserved. Clinically reviewed by the [facility]. [employer] 742263 - REV 01/24.  Learning About Stress  What is stress?     Stress is your body's response to a hard situation. Your body can have a physical, emotional, or mental response. Stress is a fact of life for most people, and it  affects everyone differently. What causes stress for you may not be stressful for someone else.  A lot of things can cause stress. You may feel stress when you go on a job interview, take a test, or run a race. This kind of short-term stress is normal and even useful. It can help you if you need to work hard or react quickly. For example, stress can help you finish an important job on time.  Long-term stress is caused by ongoing stressful situations or events. Examples of long-term stress include long-term health problems, ongoing problems at work, or conflicts in your family. Long-term stress can harm your health.  How does stress affect your health?  When you are stressed, your body responds as though you are in danger. It makes hormones that speed up your heart, make you breathe faster, and give you a burst of energy. This is called the fight-or-flight stress response. If the stress is over quickly, your body goes back to normal and no harm is done.  But if stress happens too often or lasts too long, it can have bad effects. Long-term stress can make you more likely to get sick, and it can make symptoms of some diseases worse. If you tense up when you are stressed, you may develop neck, shoulder, or low back pain. Stress is linked to high blood pressure and heart disease.  Stress also harms your emotional health. It can make you torres, tense, or depressed. Your relationships may suffer, and you may not do well at work or school.  What can you do to manage stress?  You can try these things to help manage stress:   Do something active. Exercise or activity can help reduce stress. Walking is a great way to get started. Even everyday activities such as housecleaning or yard work can help.  Try yoga or latricia chi. These techniques combine exercise and meditation. You may need some training at first to learn them.  Do something you enjoy. For example, listen to music or go to a movie. Practice your hobby or do volunteer  "work.  Meditate. This can help you relax, because you are not worrying about what happened before or what may happen in the future.  Do guided imagery. Imagine yourself in any setting that helps you feel calm. You can use online videos, books, or a teacher to guide you.  Do breathing exercises. For example:  From a standing position, bend forward from the waist with your knees slightly bent. Let your arms dangle close to the floor.  Breathe in slowly and deeply as you return to a standing position. Roll up slowly and lift your head last.  Hold your breath for just a few seconds in the standing position.  Breathe out slowly and bend forward from the waist.  Let your feelings out. Talk, laugh, cry, and express anger when you need to. Talking with supportive friends or family, a counselor, or a calin leader about your feelings is a healthy way to relieve stress. Avoid discussing your feelings with people who make you feel worse.  Write. It may help to write about things that are bothering you. This helps you find out how much stress you feel and what is causing it. When you know this, you can find better ways to cope.  What can you do to prevent stress?  You might try some of these things to help prevent stress:  Manage your time. This helps you find time to do the things you want and need to do.  Get enough sleep. Your body recovers from the stresses of the day while you are sleeping.  Get support. Your family, friends, and community can make a difference in how you experience stress.  Limit your news feed. Avoid or limit time on social media or news that may make you feel stressed.  Do something active. Exercise or activity can help reduce stress. Walking is a great way to get started.  Where can you learn more?  Go to https://www.[employer].net/patiented  Enter N032 in the search box to learn more about \"Learning About Stress.\"  Current as of: October 24, 2024  Content Version: 14.4 2024-2025 [employer] " "[employer]   Care instructions adapted under license by your healthcare professional. If you have questions about a medical condition or this instruction, always ask your healthcare professional. [employer] disclaims any warranty or liability for your use of this information.    9 Ways to Cut Back on Drinking  Maybe you've found yourself drinking more alcohol than you'd prefer. If you want to cut back, here are some ideas to try.    Think before you drink.  Do you really want a drink, or is it just a habit? If you're used to having a drink at a certain time, try doing something else then.     Look for substitutes.  Find some no-alcohol drinks that you enjoy, like flavored seltzer water, tea with honey, or tonic with a slice of lime. Or try alcohol-free beer or \"virgin\" cocktails (without the alcohol).     Drink more water.  Use water to quench your thirst. Drink a glass of water before you have any alcohol. Have another glass along with every drink or between drinks.     Shrink your drink.  For example, have a bottle of beer instead of a pint. Use a smaller glass for wine. Choose drinks with lower alcohol content (ABV%). Or use less liquor and more mixer in cocktails.     Slow down.  It's easy to drink quickly and without thinking about it. Pay attention, and make each drink last longer.     Do the math.  Total up how much you spend on alcohol each month. How much is that a year? If you cut back, what could you do with the money you save?     Take a break.  Choose a day or two each week when you won't drink at all. Notice how you feel on those days, physically and emotionally. How did you sleep? Do you feel better? Over time, add more break days.     Count calories.  Would you like to lose some weight? For some people that's a good motivator for cutting back. Figure out how many calories are in each drink. How many does that add up to in a day? In a week? In a month?     Practice saying no.  Be ready when " "someone offers you a drink. Try: \"Thanks, I've had enough.\" Or \"Thanks, but I'm cutting back.\" Or \"No, thanks. I feel better when I drink less.\"   Current as of: August 20, 2024  Content Version: 14.4    4340-7701 [employer].   Care instructions adapted under license by your healthcare professional. If you have questions about a medical condition or this instruction, always ask your healthcare professional. [employer] disclaims any warranty or liability for your use of this information.     "

## 2025-04-07 NOTE — PROGRESS NOTES
Preventive Care Visit  [facility] AND hospitals  Ana Pineda PA-C, Family Medicine  Apr 7, 2025      Assessment & Plan       ICD-10-CM    1. Routine general medical examination at a health care facility  Z00.00       2. Globus sensation  R09.A2 TSH     T4, Free     T3, Free     Thyroid peroxidase antibody     US Thyroid     TSH     T4, Free     T3, Free     Thyroid peroxidase antibody      3. Chronic fatigue  R53.82 CBC W PLT No Diff     Comprehensive Metabolic Panel     Vitamin B12     TSH     T4, Free     T3, Free     Thyroid peroxidase antibody     Iron & Iron Binding Capacity     Ferritin     Vitamin D Total     CBC W PLT No Diff     Comprehensive Metabolic Panel     Vitamin B12     TSH     T4, Free     T3, Free     Thyroid peroxidase antibody     Iron & Iron Binding Capacity     Ferritin     Vitamin D Total      4. Brittle nails  L60.3 CBC W PLT No Diff     Vitamin B12     Iron & Iron Binding Capacity     Ferritin     CBC W PLT No Diff     Vitamin B12     Iron & Iron Binding Capacity     Ferritin      5. Hair loss  L65.9 Vitamin B12     Iron & Iron Binding Capacity     Ferritin     Vitamin B12     Iron & Iron Binding Capacity     Ferritin      6. Screening for diabetes mellitus  Z13.1 Hemoglobin A1c     Hemoglobin A1c      7. BRINA (generalized anxiety disorder)  F41.1 hydrOXYzine HCl (ATARAX) 25 MG tablet      8. Vitamin B12 deficiency (non anemic)  E53.8 cyanocobalamin (VITAMIN B-12) 1000 MCG tablet      9. Iron excess  E83.19 Ferritin     Iron & Iron Binding Capacity        Annual physical completed today. Reviewed care gaps - PAP in 2027 (switch to cotesting with PAP and HPV, as Paula will be at age 31). Recommend breast cancer screening via mammogram at age 40, colon cancer screening at age 45 (unless changes to personal or family history of warrant sooner screening). Defer updated immunizations.   Globus sensation, update routine lab work. TSH normal 2.67, T4 normal 1.04. T3 and TPO in process,  "anticipate 1-5 days for lab work to return. Ordered outpatient thyroid ultrasound due to globus. Paula will be contacted to schedule ultrasound.   Chronic fatigue - update routine lab work. CBC - hemoglobin 13.4, hematocrit 39.7. RBC indices are normal. B12 low 333, see #8. TSH and T4, see above. Vitamin D in process, anticipate 1-3 days for lab work to return. Iron 367 elevated, iron saturation and iron binding capacity unable to be calculated due to iron level, ferritin normal 134. CMP: sodium 139, potassium 4.2, GFR 90, creatinine 0.81, LFTs normal.   Brittle nails - update iron and thyroid panel, see above.   Hair loss, update thyroid studies, B12 and iron studies, see above.   Screening for diabetes, maternal family history in grandfather possibly. A1c normal 4.6%.   BRINA -  not at goal. Refill Hydroxyzine. Good stress management and coping techniques. Aware of 211 and crisis line information. Update routine labs as noted above.   B12 is low 333, start B12 1000 mcg daily, recheck in 3-months.   Excess iron at 367, unable to calculate TIBC or iron saturation due to this, recommend recheck in 4-weeks to determine if level today is accurate. If so, question hemochromatosis, thalassemia, etc.     The longitudinal plan of care for the diagnosis(es)/condition(s) as documented were addressed during this visit. Due to the added complexity in care, I will continue to support Paula in the subsequent management and with ongoing continuity of care.    Patient has been advised of split billing requirements and indicates understanding: Yes    BMI  Estimated body mass index is 26.36 kg/m  as calculated from the following:    Height as of this encounter: 1.607 m (5' 3.25\").    Weight as of this encounter: 68 kg (150 lb).   Weight management plan: Discussed healthy diet and exercise guidelines    Counseling  Appropriate preventive services were addressed with this patient via screening, questionnaire, or discussion as " "appropriate for fall prevention, nutrition, physical activity, Tobacco-use cessation, social engagement, weight loss and cognition.  Checklist reviewing preventive services available has been given to the patient.  Reviewed patient's diet, addressing concerns and/or questions.   She is at risk for lack of exercise and has been provided with information to increase physical activity for the benefit of her well-being.   She is at risk for psychosocial distress and has been provided with information to reduce risk.   The patient reports drinking more than 3 alcoholic drinks per day and/or more than 7 drhnks per week. The patient was counseled and given information about possible harmful effects of excessive alcohol intake.    See Patient Instructions    Return in about 53 weeks (around 4/13/2026) for Annual Wellness Visit.    Subjective   Paula is a 30 year old, presenting for the following:  Physical        4/7/2025     9:51 AM   Additional Questions   Roomed by mariela tam lpn   Accompanied by son     History of Present Illness       Reason for visit:  Throat  Symptom onset:  More than a month  Symptoms include:  Difficulty swallowing  Symptom intensity:  Moderate  Symptom progression:  Staying the same  Had these symptoms before:  No  What makes it worse:  No  What makes it better:  No   She is taking medications regularly.    Paula presents to the clinic for annual physical.     She is noticing brittle nails, thinning hair, decreased sex drive, fatigue, globus sensation (difficulties swallowing, concerns of possible lump in neck, etc.).     Intermittent chest pain/discomfort, typically at night time. Occurs while watching TV or laying in bed (or asleep), insidious onset. Takes her breath away. Pain is on right side of chest, describes as pressure \"such as having an elephant on my chest\". No shortness of breath, centralized or peripheral edema, headaches or vision changes, rash, eye changes (bulging, etc.). No changes " to weight. No tick bites, foreign travel, illness, etc. No medication changes.     History of abnormal thyroid function with pregnancies with her two oldest - Kerwin and Keith.     Anxiety has increased lately. Taking more Hydroxyzine, old prescription.     Female Physical:  Contraception: IUD - Mirena 4/10/24 with OBGYN  Risk for STI?: none  Last pap: 4/10/24 - normal. Due for follow up in 2027.   Any hx of abnormal paps:  none  FH of early CA?: none  Cholesterol/DM concerns/screening: due  Tobacco?: never user  Calcium intake: dietary  DEXA: N/A  Last mammogram: N/A - start age 41  Colon cancer screening: N/A - start age 45  Immunizations: COVID    Family history:   - Throat cancer in maternal grandmother, unsure type or age of onset  - Hypertension in maternal uncle, as well as obesity    Advance Care Planning  Patient does not have a Health Care Directive: Discussed advance care planning with patient; information given to patient to review.        4/6/2025   General Health   How would you rate your overall physical health? (!) FAIR   Feel stress (tense, anxious, or unable to sleep) Rather much   (!) STRESS CONCERN      4/6/2025   Nutrition   Three or more servings of calcium each day? (!) NO   Diet: Regular (no restrictions)   How many servings of fruit and vegetables per day? (!) 0-1   How many sweetened beverages each day? 0-1         4/6/2025   Exercise   Days per week of moderate/strenous exercise 3 days   Average minutes spent exercising at this level 30 min         4/6/2025   Social Factors   Frequency of gathering with friends or relatives Twice a week   Worry food won't last until get money to buy more No   Food not last or not have enough money for food? No   Do you have housing? (Housing is defined as stable permanent housing and does not include staying ouside in a car, in a tent, in an abandoned building, in an overnight shelter, or couch-surfing.) Yes   Are you worried about losing your housing? No    Lack of transportation? No   Unable to get utilities (heat,electricity)? No         4/6/2025   Dental   Dentist two times every year? Yes     Today's PHQ-2 Score:       4/6/2025     9:31 PM   PHQ-2 ( 1999 [employer])   Q1: Little interest or pleasure in doing things 1   Q2: Feeling down, depressed or hopeless 0   PHQ-2 Score 1    Q1: Little interest or pleasure in doing things Several days   Q2: Feeling down, depressed or hopeless Not at all   PHQ-2 Score 1       Patient-reported         4/6/2025   Substance Use   Alcohol more than 3/day or more than 7/wk Yes   How often do you have a drink containing alcohol 2 to 4 times a month   How many alcohol drinks on typical day 3 or 4   How often do you have 5+ drinks at one occasion Monthly   Audit 2/3 Score 3   How often not able to stop drinking once started Never   How often failed to do what normally expected Never   How often needed first drink in am after a heavy drinking session Never   How often feeling of guilt or remorse after drinking Never   How often unable to remember what happened the night before Never   Have you or someone else been injured because of your drinking No   Has anyone been concerned or suggested you cut down on drinking No   TOTAL SCORE - AUDIT 5   Do you use any other substances recreationally? No     Social History     Tobacco Use    Smoking status: Never    Smokeless tobacco: Never   Vaping Use    Vaping status: Never Used   Substance Use Topics    Alcohol use: Not Currently    Drug use: Never     Mammogram Screening - Patient under 40 years of age: Routine Mammogram Screening not recommended.         4/6/2025   STI Screening   New sexual partner(s) since last STI/HIV test? No     History of abnormal Pap smear: No - age 21-29 PAP every 3 years recommended        4/10/2024     4:01 PM 4/1/2021     9:08 AM   PAP / HPV   PAP Negative for Intraepithelial Lesion or Malignancy (NILM)     PAP (Historical)  NIL          4/6/2025  "  Contraception/Family Planning   Questions about contraception or family planning No     Reviewed and updated as needed this visit by Provider   Tobacco  Allergies  Meds  Problems  Med Hx  Surg Hx  Fam Hx            Past Medical History:   Diagnosis Date    History of diabetes insipidus     in first pregnancy    History of gestational hypertension     Uncomplicated asthma     Varicella      Past Surgical History:   Procedure Laterality Date    APPENDECTOMY       SECTION  2018     SECTION N/A 2021    Procedure:  SECTION;  Surgeon: Seth Morfin MD;  Location: [facility] OR     SECTION N/A 2023    Procedure:  SECTION;  Surgeon: Radha Reed MD;  Location: [facility] L[facility]    INSERT FETAL THORACOAMNIOTIC SHUNT N/A 2023    Procedure: INSERTION, SHUNT, THORACOAMNIOTIC  Plan for thoracentesis only 23. Fluid will be sent to lab. No shunt placement per MD.;  Surgeon: Abrahan Santos MD;  Location: [facility] L[facility]    INSERT FETAL THORACOAMNIOTIC SHUNT Bilateral 2023    Procedure: INSERTION, SHUNT, THORACOAMNIOTIC BILATERAL;  Surgeon: Abrahan Santos MD;  Location: [facility]     Review of Systems  Constitutional, HEENT, cardiovascular, pulmonary, GI, , musculoskeletal, neuro, skin, endocrine and psych systems are negative, except as otherwise noted.    Objective    Exam  /82 (BP Location: Right arm, Patient Position: Sitting, Cuff Size: Adult Regular)   Pulse 96   Temp 98.6  F (37  C) (Tympanic)   Resp 20   Ht 1.607 m (5' 3.25\")   Wt 68 kg (150 lb)   LMP 2025 (Exact Date)   BMI 26.36 kg/m     Estimated body mass index is 26.36 kg/m  as calculated from the following:    Height as of this encounter: 1.607 m (5' 3.25\").    Weight as of this encounter: 68 kg (150 lb).    Physical Exam  GENERAL: alert and no distress  EYES: Eyes grossly normal to inspection  HENT: ear canals and TM's normal, nose and mouth without ulcers or lesions  NECK: no " adenopathy, no asymmetry, masses, or scars  RESP: lungs clear to auscultation - no rales, rhonchi or wheezes  CV: regular rate and rhythm, normal S1 S2, no S3 or S4, no murmur, click or rub, no peripheral edema  MS: no gross musculoskeletal defects noted, no edema  SKIN: no suspicious lesions or rashes  NEURO: Normal strength and tone, mentation intact and speech normal  PSYCH: mentation appears normal, affect normal/bright  LYMPH: normal ant/post cervical, supraclavicular nodes    Results for orders placed or performed in visit on 04/07/25   CBC W PLT No Diff     Status: Normal   Result Value Ref Range    WBC Count 5.5 4.0 - 11.0 10e3/uL    RBC Count 4.11 3.80 - 5.20 10e6/uL    Hemoglobin 13.4 11.7 - 15.7 g/dL    Hematocrit 39.7 35.0 - 47.0 %    MCV 97 78 - 100 fL    MCH 32.6 26.5 - 33.0 pg    MCHC 33.8 31.5 - 36.5 g/dL    RDW 13.5 10.0 - 15.0 %    Platelet Count 173 150 - 450 10e3/uL   Comprehensive Metabolic Panel     Status: Abnormal   Result Value Ref Range    Sodium 139 135 - 145 mmol/L    Potassium 4.2 3.4 - 5.3 mmol/L    Carbon Dioxide (CO2) 26 22 - 29 mmol/L    Anion Gap 10 7 - 15 mmol/L    Urea Nitrogen 16.5 6.0 - 20.0 mg/dL    Creatinine 0.81 0.51 - 0.95 mg/dL    GFR Estimate >90 >60 mL/min/1.73m2    Calcium 9.9 8.8 - 10.4 mg/dL    Chloride 103 98 - 107 mmol/L    Glucose 99 70 - 99 mg/dL    Alkaline Phosphatase 69 40 - 150 U/L    AST 32 0 - 45 U/L    ALT 29 0 - 50 U/L    Protein Total 7.4 6.4 - 8.3 g/dL    Albumin 4.8 3.5 - 5.2 g/dL    Bilirubin Total 2.3 (H) <=1.2 mg/dL   Vitamin B12     Status: Normal   Result Value Ref Range    Vitamin B12 333 232 - 1,245 pg/mL   TSH     Status: Normal   Result Value Ref Range    TSH 2.67 0.30 - 4.20 uIU/mL   T4, Free     Status: Normal   Result Value Ref Range    Free T4 1.04 0.90 - 1.70 ng/dL   Iron & Iron Binding Capacity     Status: Abnormal   Result Value Ref Range    Iron 367 (H) 37 - 145 ug/dL    Iron Binding Capacity      Iron Sat Index     Ferritin     Status:  Normal   Result Value Ref Range    Ferritin 134 6 - 175 ng/mL   Hemoglobin A1c     Status: Normal   Result Value Ref Range    Estimated Average Glucose 85 <117 mg/dL    Hemoglobin A1C 4.6 <5.7 %     Signed Electronically by: Ana Pineda PA-C

## 2025-04-07 NOTE — NURSING NOTE
"Patient presents to the clinic for physical.    Advance Care Directive on file? no  Advance Care Directive provided to patient? Declined.      Chief Complaint   Patient presents with    Physical       Initial /82 (BP Location: Right arm, Patient Position: Sitting, Cuff Size: Adult Regular)   Pulse 96   Temp 98.6  F (37  C) (Tympanic)   Resp 20   Ht 1.607 m (5' 3.25\")   Wt 68 kg (150 lb)   LMP 04/05/2025 (Exact Date)   BMI 26.36 kg/m   Estimated body mass index is 26.36 kg/m  as calculated from the following:    Height as of this encounter: 1.607 m (5' 3.25\").    Weight as of this encounter: 68 kg (150 lb).  Medication Reconciliation: complete        Hetal Goldman LPN     "

## 2025-04-08 LAB — THYROPEROXIDASE AB SERPL-ACNC: 578 IU/ML

## 2025-04-09 ENCOUNTER — HOSPITAL ENCOUNTER (OUTPATIENT)
Dept: ULTRASOUND IMAGING | Facility: OTHER | Age: 30
Discharge: HOME | End: 2025-04-09
Attending: PHYSICIAN ASSISTANT
Payer: COMMERCIAL

## 2025-04-09 DIAGNOSIS — R09.A2 GLOBUS SENSATION: ICD-10-CM

## 2025-04-09 PROCEDURE — 76536 US EXAM OF HEAD AND NECK: CPT

## 2025-05-22 ENCOUNTER — RESULTS FOLLOW-UP (OUTPATIENT)
Dept: FAMILY MEDICINE | Facility: OTHER | Age: 30
End: 2025-05-22

## 2025-05-22 ENCOUNTER — OFFICE VISIT (OUTPATIENT)
Dept: FAMILY MEDICINE | Facility: OTHER | Age: 30
End: 2025-05-22
Attending: PHYSICIAN ASSISTANT
Payer: COMMERCIAL

## 2025-05-22 VITALS
SYSTOLIC BLOOD PRESSURE: 126 MMHG | DIASTOLIC BLOOD PRESSURE: 80 MMHG | HEART RATE: 74 BPM | BODY MASS INDEX: 26.19 KG/M2 | TEMPERATURE: 97.4 F | WEIGHT: 149 LBS

## 2025-05-22 DIAGNOSIS — E06.3 HASHIMOTO'S THYROIDITIS: ICD-10-CM

## 2025-05-22 DIAGNOSIS — T36.95XA ANTIBIOTIC-INDUCED YEAST INFECTION: ICD-10-CM

## 2025-05-22 DIAGNOSIS — H65.91 OME (OTITIS MEDIA WITH EFFUSION), RIGHT: Primary | ICD-10-CM

## 2025-05-22 DIAGNOSIS — L65.9 HAIR LOSS: ICD-10-CM

## 2025-05-22 DIAGNOSIS — E06.9 THYROIDITIS: ICD-10-CM

## 2025-05-22 DIAGNOSIS — E83.19 IRON EXCESS: ICD-10-CM

## 2025-05-22 DIAGNOSIS — B37.9 ANTIBIOTIC-INDUCED YEAST INFECTION: ICD-10-CM

## 2025-05-22 LAB
FERRITIN SERPL-MCNC: 78 NG/ML (ref 6–175)
IRON BINDING CAPACITY (ROCHE): 337 UG/DL (ref 240–430)
IRON SATN MFR SERPL: 41 % (ref 15–46)
IRON SERPL-MCNC: 139 UG/DL (ref 37–145)
T4 FREE SERPL-MCNC: 1.08 NG/DL (ref 0.9–1.7)
TSH SERPL DL<=0.005 MIU/L-ACNC: 2.83 UIU/ML (ref 0.3–4.2)

## 2025-05-22 PROCEDURE — 83550 IRON BINDING TEST: CPT | Mod: ZL | Performed by: PHYSICIAN ASSISTANT

## 2025-05-22 PROCEDURE — 82728 ASSAY OF FERRITIN: CPT | Mod: ZL | Performed by: PHYSICIAN ASSISTANT

## 2025-05-22 PROCEDURE — 84439 ASSAY OF FREE THYROXINE: CPT | Mod: ZL | Performed by: PHYSICIAN ASSISTANT

## 2025-05-22 PROCEDURE — 84443 ASSAY THYROID STIM HORMONE: CPT | Mod: ZL | Performed by: PHYSICIAN ASSISTANT

## 2025-05-22 PROCEDURE — 36415 COLL VENOUS BLD VENIPUNCTURE: CPT | Mod: ZL | Performed by: PHYSICIAN ASSISTANT

## 2025-05-22 RX ORDER — FLUCONAZOLE 150 MG/1
150 TABLET ORAL
Qty: 3 TABLET | Refills: 0 | Status: SHIPPED | OUTPATIENT
Start: 2025-05-22 | End: 2025-05-29

## 2025-05-22 RX ORDER — LEVOTHYROXINE SODIUM 25 UG/1
25 TABLET ORAL
Qty: 90 TABLET | Refills: 4 | Status: SHIPPED | OUTPATIENT
Start: 2025-05-22

## 2025-05-22 RX ORDER — AMOXICILLIN 875 MG/1
875 TABLET, COATED ORAL 2 TIMES DAILY
Qty: 14 TABLET | Refills: 0 | Status: SHIPPED | OUTPATIENT
Start: 2025-05-22 | End: 2025-05-29

## 2025-05-22 ASSESSMENT — PAIN SCALES - GENERAL: PAINLEVEL_OUTOF10: NO PAIN (0)

## 2025-05-22 NOTE — TELEPHONE ENCOUNTER
Continue with same meds?      Synthroid currently at 25mcg daily.      Christi Gavin RN on 5/22/2025 at 10:39 AM

## 2025-05-22 NOTE — PROGRESS NOTES
Assessment & Plan       ICD-10-CM    1. OME (otitis media with effusion), right  H65.91 amoxicillin (AMOXIL) 875 MG tablet      2. Antibiotic-induced yeast infection  B37.9 fluconazole (DIFLUCAN) 150 MG tablet    T36.95XA       3. Iron excess  E83.19 Ferritin     Iron & Iron Binding Capacity      4. Hair loss  L65.9 T4, Free     TSH      5. Hashimoto's thyroiditis  E06.3       6. Thyroiditis  E06.9 levothyroxine (SYNTHROID/LEVOTHROID) 25 MCG tablet        Acute right otitis media, treat with oral amoxicillin 875 mg twice daily for 7 days.  Recommend Tylenol, Motrin and warm compresses as needed.  Recommend utilize caution if bathing or swimming as she may have more sensitive to parametric pressure and other pressure changes.  Return precautions reviewed.  History of antibiotic induced yeast infection, prescribed Diflucan x 3.  Return precautions reviewed.  Excess iron, plan to update iron and ferritin levels today. Iron 139 (previously 367 on 4/10/25), iron binding capacity 337 (previously unable to be calculated due to iron levels), iron saturation 41 (previously unable to be calculated due to iron levels), ferritin 78 (previously 134).   Hair loss - improved since starting Synthroid. Update TSH and T4 levels today.   Hashimoto's thyroiditis - new diagnosis in April 2025, TPO positive. Update TSH and T4 today. TSH 2.83, T4 1.08. I refilled Synthroid 25 mcg. Please take your Levothyroxine (Synthroid) first thing in the morning on an empty stomach - this helps with absorption. Wait 60 minutes prior to eating, drinking and/or taking other medications. It is important to take Levothyroxine (Synthroid) at the same time everyday.     The longitudinal plan of care for the diagnosis(es)/condition(s) as documented were addressed during this visit. Due to the added complexity in care, I will continue to support Paula in the subsequent management and with ongoing continuity of care.    Follow-up  Return in about 1 year  (around 5/22/2026) for Medication follow up - thyroid.     Subjective   Paula is a 30 year old, presenting for the following health issues:  Thyroid Problem (Following up on thyroid)        5/22/2025     8:51 AM   Additional Questions   Roomed by Mya   Accompanied by toddler son     History of Present Illness       Hypothyroidism:     Since last visit, patient describes the following symptoms::  None    She eats 2-3 servings of fruits and vegetables daily.She consumes 2 sweetened beverage(s) daily.She exercises with enough effort to increase her heart rate 30 to 60 minutes per day.  She exercises with enough effort to increase her heart rate 3 or less days per week.   She is taking medications regularly.     Paula presents to the clinic for follow up on office visit from 4/7/25. We completed her annual physical at that time. Notable hair loss and fatigue.   - TSH normal 2.67, T4 normal 1.04, T3 normal 3.2, TPO elevated at 578  - B12 low at 333, started B12 1000 mcg daily.   - Iron levels: iron elevated 367, iron saturation and TIBC unable to be calculated due to iron level. Ferritin normal 134. Plan to update iron in 6-8 weeks.   - Ordered outpatient thyroid ultrasound due to globus sensation. + hypervascular, heterogeneous thyroid suggestive of chronic thyroiditis.     Today, 5/22/2025:, Feeling much better since starting levothyroxine.  Declines any changes to hair, skin, nails, bowel, bladder or weight.  Energy has been improved.  Having bilateral ear pain, equal bilaterally.  Declines any ear drainage.  No current or recent illness symptoms.  Declines chest pain, shortness of breath, centralized or peripheral edema, headaches and or vision changes. No missed doses of Synthroid, taking as prescribed.     Review of Systems  Constitutional, HEENT, cardiovascular, pulmonary, GI, , musculoskeletal, neuro, skin, endocrine and psych systems are negative, except as otherwise noted.     Objective    /80  (BP Location: Right arm, Patient Position: Sitting, Cuff Size: Adult Regular)   Pulse 74   Temp 97.4  F (36.3  C) (Tympanic)   Wt 67.6 kg (149 lb)   LMP 04/05/2025 (Exact Date)   BMI 26.19 kg/m    Body mass index is 26.19 kg/m .  Physical Exam   GENERAL: alert and no distress  EYES: Eyes grossly normal to inspection  HENT: normal cephalic/atraumatic, right ear: clear effusion and erythematous, left ear: normal: no effusions, no erythema, normal landmarks, nose and mouth without ulcers or lesions, oropharynx clear, and oral mucous membranes moist  NECK: no adenopathy, no asymmetry, masses, or scars  RESP: lungs clear to auscultation - no rales, rhonchi or wheezes  CV: regular rate and rhythm, normal S1 S2, no S3 or S4, no murmur, click or rub, no peripheral edema  MS: no gross musculoskeletal defects noted, no edema  SKIN: no suspicious lesions or rashes  PSYCH: mentation appears normal, affect normal/bright    Results for orders placed or performed in visit on 05/22/25   Ferritin     Status: Normal   Result Value Ref Range    Ferritin 78 6 - 175 ng/mL   Iron & Iron Binding Capacity     Status: Normal   Result Value Ref Range    Iron 139 37 - 145 ug/dL    Iron Binding Capacity 337 240 - 430 ug/dL    Iron Sat Index 41 15 - 46 %   T4, Free     Status: Normal   Result Value Ref Range    Free T4 1.08 0.90 - 1.70 ng/dL   TSH     Status: Normal   Result Value Ref Range    TSH 2.83 0.30 - 4.20 uIU/mL       Signed Electronically by: Ana Pineda PA-C

## 2025-05-22 NOTE — NURSING NOTE
"Patient presents to the clinic for thyroid follow up    FOOD SECURITY SCREENING QUESTIONS:    The next two questions are to help us understand your food security.  If you are feeling you need any assistance in this area, we have resources available to support you today.    Hunger Vital Signs:  Within the past 12 months we worried whether our food would run out before we got money to buy more. Never  Within the past 12 months the food we bought just didn't last and we didn't have money to get more. Never    Advance Care Directive on file? No  Advance Care Directive provided to patient? Declined    Chief Complaint   Patient presents with    Thyroid Problem     Following up on thyroid       Initial /80 (BP Location: Right arm, Patient Position: Sitting, Cuff Size: Adult Regular)   Pulse 74   Temp 97.4  F (36.3  C) (Tympanic)   Wt 67.6 kg (149 lb)   LMP 04/05/2025 (Exact Date)   BMI 26.19 kg/m   Estimated body mass index is 26.19 kg/m  as calculated from the following:    Height as of 4/7/25: 1.607 m (5' 3.25\").    Weight as of this encounter: 67.6 kg (149 lb).  Medication Reconciliation: complete        Mya Thornton CMA   "

## 2025-06-11 ENCOUNTER — MYC MEDICAL ADVICE (OUTPATIENT)
Dept: FAMILY MEDICINE | Facility: OTHER | Age: 30
End: 2025-06-11
Payer: COMMERCIAL

## 2025-06-11 DIAGNOSIS — H10.13 ALLERGIC CONJUNCTIVITIS, BILATERAL: ICD-10-CM

## 2025-06-11 DIAGNOSIS — H10.33 ACUTE BACTERIAL CONJUNCTIVITIS OF BOTH EYES: Primary | ICD-10-CM

## 2025-06-11 RX ORDER — OFLOXACIN 3 MG/ML
2 SOLUTION/ DROPS OPHTHALMIC 4 TIMES DAILY
Qty: 10 ML | Refills: 0 | Status: SHIPPED | OUTPATIENT
Start: 2025-06-11

## 2025-06-11 RX ORDER — OLOPATADINE HYDROCHLORIDE 2 MG/ML
1 SOLUTION OPHTHALMIC DAILY
Qty: 2.5 ML | Refills: 0 | Status: SHIPPED | OUTPATIENT
Start: 2025-06-11

## 2025-08-11 ENCOUNTER — TELEPHONE (OUTPATIENT)
Dept: FAMILY MEDICINE | Facility: OTHER | Age: 30
End: 2025-08-11
Payer: COMMERCIAL

## 2025-08-11 DIAGNOSIS — E06.3 HASHIMOTO'S THYROIDITIS: Primary | ICD-10-CM

## 2025-08-18 ENCOUNTER — LAB (OUTPATIENT)
Dept: LAB | Facility: OTHER | Age: 30
End: 2025-08-18
Attending: PHYSICIAN ASSISTANT
Payer: COMMERCIAL

## 2025-08-18 DIAGNOSIS — E06.3 HASHIMOTO'S THYROIDITIS: ICD-10-CM

## 2025-08-18 LAB
T4 FREE SERPL-MCNC: 1.15 NG/DL (ref 0.9–1.7)
TSH SERPL DL<=0.005 MIU/L-ACNC: 1.75 UIU/ML (ref 0.3–4.2)

## 2025-08-18 PROCEDURE — 84443 ASSAY THYROID STIM HORMONE: CPT | Mod: ZL

## 2025-08-18 PROCEDURE — 84439 ASSAY OF FREE THYROXINE: CPT | Mod: ZL

## 2025-08-18 PROCEDURE — 36415 COLL VENOUS BLD VENIPUNCTURE: CPT | Mod: ZL

## (undated) DEVICE — NDL ECLIPSE 22GA 1.5"

## (undated) DEVICE — PACK C SECTION SMA15CSFCA

## (undated) DEVICE — SOL NACL 0.9% IRRIG 1000ML BOTTLE 07138-09

## (undated) DEVICE — SU VICRYL 3-0 CT 36" J356H

## (undated) DEVICE — STPL SKIN SUBCUTICULAR INSORB  2030

## (undated) DEVICE — SU VICRYL 3-0 CTX 36" UND J980H

## (undated) DEVICE — GLOVE ESTEEM POWDER FREE SMT 6.5  2D72PT65

## (undated) DEVICE — COVER LIGHT HANDLE LT-F02

## (undated) DEVICE — SU PDS II 0 CTX 60" Z990G

## (undated) DEVICE — DRSG MEDIPORE 3 1/2X8" 3570

## (undated) DEVICE — PREP CHLORAPREP 26ML TINTED ORANGE  260815

## (undated) DEVICE — SYR 10ML LL W/O NDL

## (undated) DEVICE — DRSG STERI STRIP 1/2X4" R1547

## (undated) DEVICE — ESU GROUND PAD ADULT W/CORD E7507

## (undated) DEVICE — PAD CHUX UNDERPAD 30X36" P3036C

## (undated) DEVICE — GLOVE PROTEXIS BLUE W/NEU-THERA 6.5  2D73EB65

## (undated) DEVICE — BLADE KNIFE SURG 11 371111

## (undated) DEVICE — DRAPE STERI TOWEL LG 1010

## (undated) DEVICE — STRAP KNEE/BODY 31143004

## (undated) DEVICE — ESU GROUND PAD UNIVERSAL W/O CORD

## (undated) DEVICE — PACK C-SECTION LF PL15OTA83B

## (undated) DEVICE — SLEEVE COMPRESSION SCD KNEE MED 74022

## (undated) DEVICE — SU VICRYL 0 CT-1 36" J346H

## (undated) DEVICE — NDL COUNTER 20CT 31142493

## (undated) DEVICE — STOCKING SLEEVE COMPRESSION CALF LG

## (undated) DEVICE — SU MONOCRYL 4-0 PS-2 18" UND Y496G

## (undated) DEVICE — Device

## (undated) DEVICE — GLOVE PROTEXIS POWDER FREE SMT 7.5  2D72PT75X

## (undated) DEVICE — NDL ECLIPSE 18GA 1.5"

## (undated) DEVICE — SU VICRYL 0 CTX 36" J370H

## (undated) DEVICE — CATH TRAY FOLEY 16FR BARDEX W/DRAIN BAG STATLOCK 300316A

## (undated) DEVICE — LIGHT HANDLE X2

## (undated) DEVICE — PAD ABD 5X9" STERILE 9190A

## (undated) DEVICE — SOL WATER 1500ML

## (undated) DEVICE — SOL WATER IRRIG 1000ML BOTTLE 07139-09

## (undated) DEVICE — ADH LIQUID MASTISOL TOPICAL VIAL 2-3ML 0523-48

## (undated) DEVICE — TRAY AMNIOCENTESIS LATEX

## (undated) DEVICE — SU CHROMIC 1 CTX 36" 905H

## (undated) DEVICE — SU DERMABOND DHV12

## (undated) RX ORDER — SODIUM CHLORIDE, SODIUM LACTATE, POTASSIUM CHLORIDE, CALCIUM CHLORIDE 600; 310; 30; 20 MG/100ML; MG/100ML; MG/100ML; MG/100ML
INJECTION, SOLUTION INTRAVENOUS
Status: DISPENSED
Start: 2021-02-19

## (undated) RX ORDER — MORPHINE SULFATE 1 MG/ML
INJECTION, SOLUTION EPIDURAL; INTRATHECAL; INTRAVENOUS
Status: DISPENSED
Start: 2023-08-02

## (undated) RX ORDER — FENTANYL CITRATE 50 UG/ML
INJECTION, SOLUTION INTRAMUSCULAR; INTRAVENOUS
Status: DISPENSED
Start: 2022-02-22

## (undated) RX ORDER — CEFTRIAXONE SODIUM 1 G/50ML
INJECTION, SOLUTION INTRAVENOUS
Status: DISPENSED
Start: 2022-02-22

## (undated) RX ORDER — FENTANYL CITRATE 50 UG/ML
INJECTION, SOLUTION INTRAMUSCULAR; INTRAVENOUS
Status: DISPENSED
Start: 2021-02-19

## (undated) RX ORDER — BUPIVACAINE HYDROCHLORIDE AND EPINEPHRINE 5; 5 MG/ML; UG/ML
INJECTION, SOLUTION EPIDURAL; INTRACAUDAL; PERINEURAL
Status: DISPENSED
Start: 2021-02-19

## (undated) RX ORDER — KETOROLAC TROMETHAMINE 30 MG/ML
INJECTION, SOLUTION INTRAMUSCULAR; INTRAVENOUS
Status: DISPENSED
Start: 2019-11-11

## (undated) RX ORDER — ONDANSETRON 2 MG/ML
INJECTION INTRAMUSCULAR; INTRAVENOUS
Status: DISPENSED
Start: 2022-02-22

## (undated) RX ORDER — KETOROLAC TROMETHAMINE 30 MG/ML
INJECTION, SOLUTION INTRAMUSCULAR; INTRAVENOUS
Status: DISPENSED
Start: 2022-02-22

## (undated) RX ORDER — CEFTRIAXONE SODIUM 1 G/50ML
INJECTION, SOLUTION INTRAVENOUS
Status: DISPENSED
Start: 2022-02-21

## (undated) RX ORDER — HYDROCODONE BITARTRATE AND ACETAMINOPHEN 5; 325 MG/1; MG/1
TABLET ORAL
Status: DISPENSED
Start: 2022-02-22

## (undated) RX ORDER — FENTANYL CITRATE 50 UG/ML
INJECTION, SOLUTION INTRAMUSCULAR; INTRAVENOUS
Status: DISPENSED
Start: 2023-08-02

## (undated) RX ORDER — LORAZEPAM 1 MG/1
TABLET ORAL
Status: DISPENSED
Start: 2019-04-09

## (undated) RX ORDER — OXYTOCIN/0.9 % SODIUM CHLORIDE 30/500 ML
PLASTIC BAG, INJECTION (ML) INTRAVENOUS
Status: DISPENSED
Start: 2021-02-19

## (undated) RX ORDER — ACETAMINOPHEN 10 MG/ML
INJECTION, SOLUTION INTRAVENOUS
Status: DISPENSED
Start: 2021-02-19

## (undated) RX ORDER — BETAMETHASONE SODIUM PHOSPHATE AND BETAMETHASONE ACETATE 3; 3 MG/ML; MG/ML
INJECTION, SUSPENSION INTRA-ARTICULAR; INTRALESIONAL; INTRAMUSCULAR; SOFT TISSUE
Status: DISPENSED
Start: 2023-05-16

## (undated) RX ORDER — ONDANSETRON 2 MG/ML
INJECTION INTRAMUSCULAR; INTRAVENOUS
Status: DISPENSED
Start: 2021-02-19

## (undated) RX ORDER — OXYTOCIN/0.9 % SODIUM CHLORIDE 30/500 ML
PLASTIC BAG, INJECTION (ML) INTRAVENOUS
Status: DISPENSED
Start: 2023-08-02

## (undated) RX ORDER — CEFAZOLIN SODIUM 2 G/100ML
INJECTION, SOLUTION INTRAVENOUS
Status: DISPENSED
Start: 2021-02-19

## (undated) RX ORDER — KETOROLAC TROMETHAMINE 30 MG/ML
INJECTION, SOLUTION INTRAMUSCULAR; INTRAVENOUS
Status: DISPENSED
Start: 2021-02-19

## (undated) RX ORDER — BETAMETHASONE SODIUM PHOSPHATE AND BETAMETHASONE ACETATE 3; 3 MG/ML; MG/ML
INJECTION, SUSPENSION INTRA-ARTICULAR; INTRALESIONAL; INTRAMUSCULAR; SOFT TISSUE
Status: DISPENSED
Start: 2023-05-17

## (undated) RX ORDER — SODIUM CHLORIDE 9 MG/ML
INJECTION, SOLUTION INTRAVENOUS
Status: DISPENSED
Start: 2019-08-20

## (undated) RX ORDER — FENTANYL CITRATE-0.9 % NACL/PF 10 MCG/ML
PLASTIC BAG, INJECTION (ML) INTRAVENOUS
Status: DISPENSED
Start: 2023-04-21

## (undated) RX ORDER — PANTOPRAZOLE SODIUM 40 MG/1
TABLET, DELAYED RELEASE ORAL
Status: DISPENSED
Start: 2019-04-09

## (undated) RX ORDER — SODIUM CHLORIDE 9 MG/ML
INJECTION, SOLUTION INTRAVENOUS
Status: DISPENSED
Start: 2022-02-22